# Patient Record
Sex: FEMALE | Race: WHITE | NOT HISPANIC OR LATINO | Employment: OTHER | URBAN - METROPOLITAN AREA
[De-identification: names, ages, dates, MRNs, and addresses within clinical notes are randomized per-mention and may not be internally consistent; named-entity substitution may affect disease eponyms.]

---

## 2018-04-04 ENCOUNTER — LAB REQUISITION (OUTPATIENT)
Dept: LAB | Facility: HOSPITAL | Age: 68
End: 2018-04-04
Payer: COMMERCIAL

## 2018-04-04 DIAGNOSIS — S83.242A OTHER TEAR OF MEDIAL MENISCUS, CURRENT INJURY, LEFT KNEE, INITIAL ENCOUNTER: ICD-10-CM

## 2018-04-04 PROCEDURE — 88304 TISSUE EXAM BY PATHOLOGIST: CPT | Performed by: PATHOLOGY

## 2018-04-10 LAB — SURGICAL PATHOLOGY (HISTORICAL): NORMAL

## 2018-09-21 ENCOUNTER — TRANSCRIBE ORDERS (OUTPATIENT)
Dept: ADMINISTRATIVE | Facility: HOSPITAL | Age: 68
End: 2018-09-21

## 2018-09-21 ENCOUNTER — APPOINTMENT (OUTPATIENT)
Dept: LAB | Facility: HOSPITAL | Age: 68
End: 2018-09-21
Attending: INTERNAL MEDICINE
Payer: COMMERCIAL

## 2018-09-21 DIAGNOSIS — C09.9 RIGHT TONSILLAR SQUAMOUS CELL CARCINOMA (HCC): Primary | ICD-10-CM

## 2018-09-21 DIAGNOSIS — C09.9 RIGHT TONSILLAR SQUAMOUS CELL CARCINOMA (HCC): ICD-10-CM

## 2018-09-21 LAB
ALBUMIN SERPL BCP-MCNC: 4.4 G/DL (ref 3.5–5.7)
ALP SERPL-CCNC: 87 U/L (ref 55–165)
ALT SERPL W P-5'-P-CCNC: 13 U/L (ref 7–52)
ANION GAP SERPL CALCULATED.3IONS-SCNC: 11 MMOL/L (ref 4–13)
AST SERPL W P-5'-P-CCNC: 21 U/L (ref 13–39)
BASOPHILS # BLD AUTO: 0 THOUSANDS/ΜL (ref 0–0.1)
BASOPHILS NFR BLD AUTO: 0 % (ref 0–1)
BILIRUB SERPL-MCNC: 0.4 MG/DL (ref 0.2–1)
BUN SERPL-MCNC: 15 MG/DL (ref 7–25)
CALCIUM SERPL-MCNC: 9.5 MG/DL (ref 8.6–10.5)
CHLORIDE SERPL-SCNC: 101 MMOL/L (ref 98–107)
CO2 SERPL-SCNC: 25 MMOL/L (ref 21–31)
CREAT SERPL-MCNC: 0.85 MG/DL (ref 0.7–1.3)
EOSINOPHIL # BLD AUTO: 0.1 THOUSAND/ΜL (ref 0–0.61)
EOSINOPHIL NFR BLD AUTO: 2 % (ref 0–6)
ERYTHROCYTE [DISTWIDTH] IN BLOOD BY AUTOMATED COUNT: 12.9 % (ref 11.6–15.1)
GFR SERPL CREATININE-BSD FRML MDRD: 90 ML/MIN/1.73SQ M
GLUCOSE SERPL-MCNC: 105 MG/DL (ref 65–140)
HCT VFR BLD AUTO: 40.5 % (ref 42–52)
HGB BLD-MCNC: 13.5 G/DL (ref 12–17)
LYMPHOCYTES # BLD AUTO: 0.8 THOUSANDS/ΜL (ref 0.6–4.47)
LYMPHOCYTES NFR BLD AUTO: 19 % (ref 14–44)
MCH RBC QN AUTO: 32 PG (ref 26.8–34.3)
MCHC RBC AUTO-ENTMCNC: 33.4 G/DL (ref 31.4–37.4)
MCV RBC AUTO: 96 FL (ref 82–98)
MONOCYTES # BLD AUTO: 0.2 THOUSAND/ΜL (ref 0.17–1.22)
MONOCYTES NFR BLD AUTO: 5 % (ref 4–12)
NEUTROPHILS # BLD AUTO: 3.2 THOUSANDS/ΜL (ref 1.85–7.62)
NEUTS SEG NFR BLD AUTO: 74 % (ref 43–75)
NRBC BLD AUTO-RTO: 0 /100 WBCS
PLATELET # BLD AUTO: 240 THOUSANDS/UL (ref 149–390)
PMV BLD AUTO: 8 FL (ref 8.9–12.7)
POTASSIUM SERPL-SCNC: 4.1 MMOL/L (ref 3.5–5.5)
PROT SERPL-MCNC: 7.3 G/DL (ref 6.4–8.9)
RBC # BLD AUTO: 4.23 MILLION/UL (ref 3.88–5.62)
SODIUM SERPL-SCNC: 137 MMOL/L (ref 134–143)
TSH SERPL DL<=0.05 MIU/L-ACNC: 6.86 UIU/ML (ref 0.45–5.33)
WBC # BLD AUTO: 4.3 THOUSAND/UL (ref 4.31–10.16)

## 2018-09-21 PROCEDURE — 84443 ASSAY THYROID STIM HORMONE: CPT

## 2018-09-21 PROCEDURE — 80053 COMPREHEN METABOLIC PANEL: CPT

## 2018-09-21 PROCEDURE — 36415 COLL VENOUS BLD VENIPUNCTURE: CPT

## 2018-09-21 PROCEDURE — 85025 COMPLETE CBC W/AUTO DIFF WBC: CPT

## 2018-09-28 ENCOUNTER — TRANSCRIBE ORDERS (OUTPATIENT)
Dept: ADMINISTRATIVE | Facility: HOSPITAL | Age: 68
End: 2018-09-28

## 2018-09-28 DIAGNOSIS — E03.9 ACQUIRED HYPOTHYROIDISM: ICD-10-CM

## 2018-09-28 DIAGNOSIS — Z92.21 PERSONAL HISTORY OF ANTINEOPLASTIC CHEMOTHERAPY: ICD-10-CM

## 2018-09-28 DIAGNOSIS — E03.9 MYXEDEMA HEART DISEASE: ICD-10-CM

## 2018-09-28 DIAGNOSIS — C09.9 SQUAMOUS CELL CARCINOMA OF RIGHT TONSIL (HCC): ICD-10-CM

## 2018-09-28 DIAGNOSIS — C09.9 MALIGNANT NEOPLASM OF TONSIL (HCC): Primary | ICD-10-CM

## 2018-09-28 DIAGNOSIS — I51.9 MYXEDEMA HEART DISEASE: ICD-10-CM

## 2018-09-28 DIAGNOSIS — Z92.3 PERSONAL HISTORY OF RADIATION THERAPY: ICD-10-CM

## 2018-10-05 ENCOUNTER — HOSPITAL ENCOUNTER (OUTPATIENT)
Dept: ULTRASOUND IMAGING | Facility: HOSPITAL | Age: 68
Discharge: HOME | End: 2018-10-05
Attending: INTERNAL MEDICINE
Payer: COMMERCIAL

## 2018-10-05 ENCOUNTER — APPOINTMENT (OUTPATIENT)
Dept: LAB | Facility: HOSPITAL | Age: 68
End: 2018-10-05
Attending: INTERNAL MEDICINE
Payer: COMMERCIAL

## 2018-10-05 DIAGNOSIS — E03.9 ACQUIRED HYPOTHYROIDISM: ICD-10-CM

## 2018-10-05 DIAGNOSIS — C09.9 SQUAMOUS CELL CARCINOMA OF RIGHT TONSIL (HCC): ICD-10-CM

## 2018-10-05 DIAGNOSIS — C09.9 MALIGNANT NEOPLASM OF TONSIL (HCC): ICD-10-CM

## 2018-10-05 DIAGNOSIS — Z92.21 PERSONAL HISTORY OF ANTINEOPLASTIC CHEMOTHERAPY: ICD-10-CM

## 2018-10-05 DIAGNOSIS — I51.9 MYXEDEMA HEART DISEASE: ICD-10-CM

## 2018-10-05 DIAGNOSIS — E03.9 MYXEDEMA HEART DISEASE: ICD-10-CM

## 2018-10-05 DIAGNOSIS — Z92.3 PERSONAL HISTORY OF RADIATION THERAPY: ICD-10-CM

## 2018-10-05 LAB
ALBUMIN SERPL BCP-MCNC: 4.6 G/DL (ref 3.5–5.7)
ALP SERPL-CCNC: 78 U/L (ref 55–165)
ALT SERPL W P-5'-P-CCNC: 16 U/L (ref 7–52)
ANION GAP SERPL CALCULATED.3IONS-SCNC: 8 MMOL/L (ref 4–13)
AST SERPL W P-5'-P-CCNC: 25 U/L (ref 13–39)
BASOPHILS # BLD AUTO: 0 THOUSANDS/ΜL (ref 0–0.1)
BASOPHILS NFR BLD AUTO: 1 % (ref 0–2)
BILIRUB SERPL-MCNC: 0.4 MG/DL (ref 0.2–1)
BUN SERPL-MCNC: 20 MG/DL (ref 7–25)
CALCIUM SERPL-MCNC: 10.1 MG/DL (ref 8.6–10.5)
CHLORIDE SERPL-SCNC: 101 MMOL/L (ref 98–107)
CO2 SERPL-SCNC: 30 MMOL/L (ref 21–31)
CREAT SERPL-MCNC: 0.93 MG/DL (ref 0.7–1.3)
EOSINOPHIL # BLD AUTO: 0.1 THOUSAND/ΜL (ref 0–0.61)
EOSINOPHIL NFR BLD AUTO: 1 % (ref 0–5)
ERYTHROCYTE [DISTWIDTH] IN BLOOD BY AUTOMATED COUNT: 13.1 % (ref 11.5–14.5)
GFR SERPL CREATININE-BSD FRML MDRD: 85 ML/MIN/1.73SQ M
GLUCOSE SERPL-MCNC: 96 MG/DL (ref 65–99)
HCT VFR BLD AUTO: 38.5 % (ref 36.5–49.3)
HGB BLD-MCNC: 13.2 G/DL (ref 14–18)
LYMPHOCYTES # BLD AUTO: 0.9 THOUSANDS/ΜL (ref 0.6–4.47)
LYMPHOCYTES NFR BLD AUTO: 21 % (ref 21–51)
MCH RBC QN AUTO: 32.8 PG (ref 26–34)
MCHC RBC AUTO-ENTMCNC: 34.2 G/DL (ref 31–37)
MCV RBC AUTO: 96 FL (ref 81–99)
MONOCYTES # BLD AUTO: 0.3 THOUSAND/ΜL (ref 0.17–1.22)
MONOCYTES NFR BLD AUTO: 6 % (ref 2–12)
NEUTROPHILS # BLD AUTO: 3.1 THOUSANDS/ΜL (ref 1.4–6.5)
NEUTS SEG NFR BLD AUTO: 71 % (ref 42–75)
NRBC BLD AUTO-RTO: 0 /100 WBCS
PLATELET # BLD AUTO: 254 THOUSANDS/UL (ref 149–390)
PMV BLD AUTO: 9.1 FL (ref 8.6–11.7)
POTASSIUM SERPL-SCNC: 4.5 MMOL/L (ref 3.5–5.5)
PROT SERPL-MCNC: 7.8 G/DL (ref 6.4–8.9)
RBC # BLD AUTO: 4.02 MILLION/UL (ref 4.3–5.9)
SODIUM SERPL-SCNC: 139 MMOL/L (ref 134–143)
TSH SERPL DL<=0.05 MIU/L-ACNC: 4.64 UIU/ML (ref 0.45–5.33)
WBC # BLD AUTO: 4.4 THOUSAND/UL (ref 4.8–10.8)

## 2018-10-05 PROCEDURE — 80053 COMPREHEN METABOLIC PANEL: CPT

## 2018-10-05 PROCEDURE — 76536 US EXAM OF HEAD AND NECK: CPT

## 2018-10-05 PROCEDURE — 85025 COMPLETE CBC W/AUTO DIFF WBC: CPT

## 2018-10-05 PROCEDURE — 84443 ASSAY THYROID STIM HORMONE: CPT

## 2018-10-05 PROCEDURE — 36415 COLL VENOUS BLD VENIPUNCTURE: CPT

## 2018-11-14 ENCOUNTER — TRANSCRIBE ORDERS (OUTPATIENT)
Dept: LAB | Facility: CLINIC | Age: 68
End: 2018-11-14

## 2019-09-10 ENCOUNTER — TRANSCRIBE ORDERS (OUTPATIENT)
Dept: ADMINISTRATIVE | Facility: HOSPITAL | Age: 69
End: 2019-09-10

## 2019-09-10 DIAGNOSIS — E04.1 THYROID NODULE: ICD-10-CM

## 2019-09-10 DIAGNOSIS — E89.0 POSTSURGICAL HYPOTHYROIDISM: ICD-10-CM

## 2019-09-10 DIAGNOSIS — E04.1 NONTOXIC UNINODULAR GOITER: Primary | ICD-10-CM

## 2019-09-10 DIAGNOSIS — Z92.3 HISTORY OF RADIATION THERAPY: ICD-10-CM

## 2019-09-10 DIAGNOSIS — Z92.21 PERSONAL HISTORY OF ANTINEOPLASTIC CHEMOTHERAPY: ICD-10-CM

## 2019-10-08 ENCOUNTER — HOSPITAL ENCOUNTER (OUTPATIENT)
Dept: ULTRASOUND IMAGING | Facility: HOSPITAL | Age: 69
Discharge: HOME | End: 2019-10-08
Payer: COMMERCIAL

## 2019-10-08 ENCOUNTER — APPOINTMENT (OUTPATIENT)
Dept: LAB | Facility: HOSPITAL | Age: 69
End: 2019-10-08
Payer: COMMERCIAL

## 2019-10-08 DIAGNOSIS — Z92.3 HISTORY OF RADIATION THERAPY: ICD-10-CM

## 2019-10-08 DIAGNOSIS — E04.1 THYROID NODULE: ICD-10-CM

## 2019-10-08 DIAGNOSIS — Z92.21 PERSONAL HISTORY OF ANTINEOPLASTIC CHEMOTHERAPY: ICD-10-CM

## 2019-10-08 DIAGNOSIS — E89.0 POSTSURGICAL HYPOTHYROIDISM: ICD-10-CM

## 2019-10-08 DIAGNOSIS — E04.1 NONTOXIC UNINODULAR GOITER: ICD-10-CM

## 2019-10-08 LAB
ALBUMIN SERPL BCP-MCNC: 4.5 G/DL (ref 3.5–5.7)
ALP SERPL-CCNC: 93 U/L (ref 55–165)
ALT SERPL W P-5'-P-CCNC: 11 U/L (ref 7–52)
ANION GAP SERPL CALCULATED.3IONS-SCNC: 6 MMOL/L (ref 4–13)
AST SERPL W P-5'-P-CCNC: 19 U/L (ref 13–39)
BASOPHILS # BLD AUTO: 0 THOUSANDS/ΜL (ref 0–0.1)
BASOPHILS NFR BLD AUTO: 0 % (ref 0–2)
BILIRUB SERPL-MCNC: 0.4 MG/DL (ref 0.2–1)
BUN SERPL-MCNC: 18 MG/DL (ref 7–25)
CALCIUM SERPL-MCNC: 9.9 MG/DL (ref 8.6–10.5)
CHLORIDE SERPL-SCNC: 102 MMOL/L (ref 98–107)
CO2 SERPL-SCNC: 31 MMOL/L (ref 21–31)
CREAT SERPL-MCNC: 0.91 MG/DL (ref 0.7–1.3)
EOSINOPHIL # BLD AUTO: 0.1 THOUSAND/ΜL (ref 0–0.61)
EOSINOPHIL NFR BLD AUTO: 2 % (ref 0–5)
ERYTHROCYTE [DISTWIDTH] IN BLOOD BY AUTOMATED COUNT: 12.5 % (ref 11.5–14.5)
GFR SERPL CREATININE-BSD FRML MDRD: 86 ML/MIN/1.73SQ M
GLUCOSE P FAST SERPL-MCNC: 109 MG/DL (ref 65–99)
HCT VFR BLD AUTO: 42.1 % (ref 42–47)
HGB BLD-MCNC: 14.2 G/DL (ref 14–18)
LYMPHOCYTES # BLD AUTO: 1 THOUSANDS/ΜL (ref 0.6–4.47)
LYMPHOCYTES NFR BLD AUTO: 17 % (ref 21–51)
MCH RBC QN AUTO: 32.7 PG (ref 26–34)
MCHC RBC AUTO-ENTMCNC: 33.7 G/DL (ref 31–37)
MCV RBC AUTO: 97 FL (ref 81–99)
MONOCYTES # BLD AUTO: 0.3 THOUSAND/ΜL (ref 0.17–1.22)
MONOCYTES NFR BLD AUTO: 5 % (ref 2–12)
NEUTROPHILS # BLD AUTO: 4.2 THOUSANDS/ΜL (ref 1.4–6.5)
NEUTS SEG NFR BLD AUTO: 75 % (ref 42–75)
PLATELET # BLD AUTO: 231 THOUSANDS/UL (ref 149–390)
PMV BLD AUTO: 8.5 FL (ref 8.6–11.7)
POTASSIUM SERPL-SCNC: 4.2 MMOL/L (ref 3.5–5.5)
PROT SERPL-MCNC: 7.7 G/DL (ref 6.4–8.9)
RBC # BLD AUTO: 4.34 MILLION/UL (ref 4.3–5.9)
SODIUM SERPL-SCNC: 139 MMOL/L (ref 134–143)
T3FREE SERPL-MCNC: 2.86 PG/ML (ref 2.3–4.2)
T4 FREE SERPL-MCNC: 0.68 NG/DL (ref 0.76–1.46)
TSH SERPL DL<=0.05 MIU/L-ACNC: 12.17 UIU/ML (ref 0.45–5.33)
WBC # BLD AUTO: 5.6 THOUSAND/UL (ref 4.8–10.8)

## 2019-10-08 PROCEDURE — 85025 COMPLETE CBC W/AUTO DIFF WBC: CPT

## 2019-10-08 PROCEDURE — 84481 FREE ASSAY (FT-3): CPT

## 2019-10-08 PROCEDURE — 76536 US EXAM OF HEAD AND NECK: CPT

## 2019-10-08 PROCEDURE — 80053 COMPREHEN METABOLIC PANEL: CPT

## 2019-10-08 PROCEDURE — 84443 ASSAY THYROID STIM HORMONE: CPT

## 2019-10-08 PROCEDURE — 84439 ASSAY OF FREE THYROXINE: CPT

## 2019-10-08 PROCEDURE — 36415 COLL VENOUS BLD VENIPUNCTURE: CPT

## 2019-12-14 ENCOUNTER — OFFICE VISIT (OUTPATIENT)
Dept: URGENT CARE | Facility: HOSPITAL | Age: 69
End: 2019-12-14
Payer: COMMERCIAL

## 2019-12-14 VITALS
HEIGHT: 66 IN | BODY MASS INDEX: 27.16 KG/M2 | TEMPERATURE: 98.5 F | WEIGHT: 169 LBS | OXYGEN SATURATION: 96 % | DIASTOLIC BLOOD PRESSURE: 92 MMHG | RESPIRATION RATE: 18 BRPM | HEART RATE: 85 BPM | SYSTOLIC BLOOD PRESSURE: 139 MMHG

## 2019-12-14 DIAGNOSIS — J01.90 ACUTE SINUSITIS, RECURRENCE NOT SPECIFIED, UNSPECIFIED LOCATION: Primary | ICD-10-CM

## 2019-12-14 DIAGNOSIS — B00.1 COLD SORE: ICD-10-CM

## 2019-12-14 PROCEDURE — 99213 OFFICE O/P EST LOW 20 MIN: CPT | Performed by: NURSE PRACTITIONER

## 2019-12-14 PROCEDURE — S9083 URGENT CARE CENTER GLOBAL: HCPCS | Performed by: NURSE PRACTITIONER

## 2019-12-14 RX ORDER — FLUOXETINE HYDROCHLORIDE 20 MG/1
20 CAPSULE ORAL DAILY
COMMUNITY
End: 2020-02-18 | Stop reason: SDUPTHER

## 2019-12-14 RX ORDER — LORAZEPAM 2 MG/1
2 TABLET ORAL 3 TIMES DAILY
COMMUNITY
End: 2020-02-18 | Stop reason: SDUPTHER

## 2019-12-14 RX ORDER — VALACYCLOVIR HYDROCHLORIDE 1 G/1
2000 TABLET, FILM COATED ORAL 2 TIMES DAILY
Qty: 4 TABLET | Refills: 0 | Status: SHIPPED | OUTPATIENT
Start: 2019-12-14 | End: 2020-02-18

## 2019-12-14 RX ORDER — DOXYCYCLINE 100 MG/1
100 TABLET ORAL 2 TIMES DAILY
Qty: 14 TABLET | Refills: 0 | Status: SHIPPED | OUTPATIENT
Start: 2019-12-14 | End: 2019-12-21

## 2019-12-14 RX ORDER — LEVOTHYROXINE SODIUM 0.1 MG/1
100 TABLET ORAL DAILY
COMMUNITY
End: 2020-08-18 | Stop reason: SDUPTHER

## 2019-12-14 NOTE — PROGRESS NOTES
[facility]        NAME: Josi Churchill is a 77 y o  male  : 1950    MRN: [id number]  DATE: 2019  TIME: 10:50 AM    Assessment and Plan   Acute sinusitis, recurrence not specified, unspecified location [J01 90]  1  Acute sinusitis, recurrence not specified, unspecified location  doxycycline (ADOXA) 100 MG tablet   2  Cold sore  valACYclovir (VALTREX) 1,000 mg tablet         Patient Instructions     Patient Instructions   Rest and drink extra fluids  Start antibiotic  Take probiotic  Valtrex as prescribed for cold sores  Continue with the counter medications as needed  Tylenol Motrin as needed for pain or fever  Follow up with family doctor if no improvement  Go to ER with any worsening symptoms, chest pain, shortness breath or difficulty breathing  The      Chief Complaint     Chief Complaint   Patient presents with    Fever     chills off and on    Headache    Nasal Congestion     x one month         History of Present Illness   Josi Churchill presents to the clinic c/o    This is a 52-year-old female here today with complaints of sinus pressure, congestion, and cold sores  She states symptoms started several weeks ago after having influenza vaccine  She states she then developed a cold-like illness  She states congested with her chest but this has resolved  She states that this time she is having sinus pressure congestion and has had some sores around her mouth  She denies any chest pain or shortness of breath at this time  She has been using over-the-counter Mucinex as needed  She states she has had cold sores in the past and has needed Valtrex  Review of Systems   Review of Systems   Constitutional: Negative for activity change, chills and fatigue  HENT: Positive for congestion, sinus pressure and sinus pain  Respiratory: Positive for cough  Negative for chest tightness, shortness of breath and wheezing  Cardiovascular: Negative  Skin: Negative  Neurological: Negative  Psychiatric/Behavioral: Negative  Current Medications     Long-Term Medications   Medication Sig Dispense Refill    FLUoxetine (PROzac) 20 mg capsule Take 20 mg by mouth daily      levothyroxine 100 mcg tablet Take 100 mcg by mouth daily      LORazepam (ATIVAN) 2 mg tablet Take 2 mg by mouth 3 (three) times a day      valACYclovir (VALTREX) 1,000 mg tablet Take 2 tablets (2,000 mg total) by mouth 2 (two) times a day for 1 day 4 tablet 0       Current Allergies     Allergies as of 12/14/2019    (No Known Allergies)            The following portions of the patient's history were reviewed and updated as appropriate: allergies, current medications, past family history, past medical history, past social history, past surgical history and problem list     Objective   /92   Pulse 85   Temp 98 5 °F (36 9 °C) (Tympanic)   Resp 18   Ht 5' 6" (1 676 m)   Wt 76 7 kg (169 lb)   SpO2 96%   BMI 27 28 kg/m²        Physical Exam     Physical Exam   Constitutional: He is oriented to person, place, and time  He appears well-developed and well-nourished  HENT:   Right Ear: External ear normal    Left TM has no TM, this is normal for her as per patient  No sign of infection  Neck: Normal range of motion  Neck supple  Cardiovascular: Normal rate and regular rhythm  Pulmonary/Chest: Effort normal and breath sounds normal    Neurological: He is alert and oriented to person, place, and time  Skin: Skin is warm and dry  Rash noted  Several scattered vesicular lesions around on outer lip  Psychiatric: He has a normal mood and affect  His behavior is normal    Nursing note and vitals reviewed

## 2019-12-14 NOTE — PATIENT INSTRUCTIONS
Rest and drink extra fluids  Start antibiotic  Take probiotic  Valtrex as prescribed for cold sores  Continue with the counter medications as needed  Tylenol Motrin as needed for pain or fever  Follow up with family doctor if no improvement  Go to ER with any worsening symptoms, chest pain, shortness breath or difficulty breathing    The

## 2020-02-18 ENCOUNTER — OFFICE VISIT (OUTPATIENT)
Dept: FAMILY MEDICINE CLINIC | Facility: CLINIC | Age: 70
End: 2020-02-18
Payer: COMMERCIAL

## 2020-02-18 VITALS
DIASTOLIC BLOOD PRESSURE: 88 MMHG | WEIGHT: 173 LBS | HEART RATE: 87 BPM | TEMPERATURE: 98.2 F | OXYGEN SATURATION: 98 % | SYSTOLIC BLOOD PRESSURE: 138 MMHG | RESPIRATION RATE: 16 BRPM | HEIGHT: 66 IN | BODY MASS INDEX: 27.8 KG/M2

## 2020-02-18 DIAGNOSIS — Z00.00 ENCOUNTER FOR MEDICARE ANNUAL WELLNESS EXAM: ICD-10-CM

## 2020-02-18 DIAGNOSIS — Z13.220 SCREENING CHOLESTEROL LEVEL: ICD-10-CM

## 2020-02-18 DIAGNOSIS — Z11.59 ENCOUNTER FOR HEPATITIS C SCREENING TEST FOR LOW RISK PATIENT: ICD-10-CM

## 2020-02-18 DIAGNOSIS — F41.8 DEPRESSION WITH ANXIETY: Primary | ICD-10-CM

## 2020-02-18 DIAGNOSIS — R73.01 IMPAIRED FASTING GLUCOSE: ICD-10-CM

## 2020-02-18 DIAGNOSIS — C09.9 MALIGNANT NEOPLASM OF TONSIL (HCC): ICD-10-CM

## 2020-02-18 DIAGNOSIS — E66.3 OVERWEIGHT (BMI 25.0-29.9): ICD-10-CM

## 2020-02-18 DIAGNOSIS — E03.9 ACQUIRED HYPOTHYROIDISM: ICD-10-CM

## 2020-02-18 DIAGNOSIS — Z12.31 SCREENING MAMMOGRAM, ENCOUNTER FOR: ICD-10-CM

## 2020-02-18 DIAGNOSIS — Z12.11 SCREENING FOR COLON CANCER: ICD-10-CM

## 2020-02-18 PROCEDURE — 1160F RVW MEDS BY RX/DR IN RCRD: CPT | Performed by: NURSE PRACTITIONER

## 2020-02-18 PROCEDURE — 1170F FXNL STATUS ASSESSED: CPT | Performed by: NURSE PRACTITIONER

## 2020-02-18 PROCEDURE — 3288F FALL RISK ASSESSMENT DOCD: CPT | Performed by: NURSE PRACTITIONER

## 2020-02-18 PROCEDURE — 1036F TOBACCO NON-USER: CPT | Performed by: NURSE PRACTITIONER

## 2020-02-18 PROCEDURE — 99214 OFFICE O/P EST MOD 30 MIN: CPT | Performed by: NURSE PRACTITIONER

## 2020-02-18 PROCEDURE — 1101F PT FALLS ASSESS-DOCD LE1/YR: CPT | Performed by: NURSE PRACTITIONER

## 2020-02-18 PROCEDURE — 3008F BODY MASS INDEX DOCD: CPT | Performed by: NURSE PRACTITIONER

## 2020-02-18 PROCEDURE — G0438 PPPS, INITIAL VISIT: HCPCS | Performed by: NURSE PRACTITIONER

## 2020-02-18 PROCEDURE — 1125F AMNT PAIN NOTED PAIN PRSNT: CPT | Performed by: NURSE PRACTITIONER

## 2020-02-18 RX ORDER — FLUOXETINE HYDROCHLORIDE 20 MG/1
20 CAPSULE ORAL DAILY
Qty: 90 CAPSULE | Refills: 3 | Status: SHIPPED | OUTPATIENT
Start: 2020-02-18 | End: 2020-05-06 | Stop reason: SDUPTHER

## 2020-02-18 RX ORDER — LORAZEPAM 2 MG/1
2 TABLET ORAL 3 TIMES DAILY
Qty: 90 TABLET | Refills: 2 | Status: SHIPPED | OUTPATIENT
Start: 2020-02-18 | End: 2020-05-06 | Stop reason: SDUPTHER

## 2020-02-18 RX ORDER — MULTIVITAMIN
1 TABLET ORAL DAILY
COMMUNITY

## 2020-02-18 NOTE — PATIENT INSTRUCTIONS
Monitor blood pressure daily- if consistently over 140/90- call office  Get labs drawn  Refills given for Fluoxetine and Lorazepam, call when Levothyroxine refills due  Will get Colonoscopy report from Dr Indra Yepez- if over 10 years- will have cologuard sent to your home- patient agreeable  Mammogram will be scheduled at check out  Return in 6 months or sooner if any problems/concerns        Medicare Preventive Visit Patient Instructions  Thank you for completing your Welcome to Medicare Visit or Medicare Annual Wellness Visit today  Your next wellness visit will be due in one year (2/18/2021)  The screening/preventive services that you may require over the next 5-10 years are detailed below  Some tests may not apply to you based off risk factors and/or age  Screening tests ordered at today's visit but not completed yet may show as past due  Also, please note that scanned in results may not display below  Preventive Screenings:  Service Recommendations Previous Testing/Comments   Colorectal Cancer Screening  · Colonoscopy    · Fecal Occult Blood Test (FOBT)/Fecal Immunochemical Test (FIT)  · Fecal DNA/Cologuard Test  · Flexible Sigmoidoscopy Age: 54-65 years old   Colonoscopy: every 10 years (May be performed more frequently if at higher risk)  OR  FOBT/FIT: every 1 year  OR  Cologuard: every 3 years  OR  Sigmoidoscopy: every 5 years  Screening may be recommended earlier than age 48 if at higher risk for colorectal cancer  Also, an individualized decision between you and your healthcare provider will decide whether screening between the ages of 74-80 would be appropriate   Colonoscopy: Not on file  FOBT/FIT: Not on file  Cologuard: Not on file  Sigmoidoscopy: Not on file         Prostate Cancer Screening Individualized decision between patient and health care provider in men between ages of 53-78   Medicare will cover every 12 months beginning on the day after your 50th birthday PSA: No results in last 5 years Hepatitis C Screening Once for adults born between 1945 and 1965  More frequently in patients at high risk for Hepatitis C Hep C Antibody: Not on file       Diabetes Screening 1-2 times per year if you're at risk for diabetes or have pre-diabetes Fasting glucose: 109 mg/dL   A1C: No results in last 5 years    Screening Current   Cholesterol Screening Once every 5 years if you don't have a lipid disorder  May order more often based on risk factors  Lipid panel: Not on file          Other Preventive Screenings Covered by Medicare:  1  Abdominal Aortic Aneurysm (AAA) Screening: covered once if your at risk  You're considered to be at risk if you have a family history of AAA or a male between the age of 73-68 who smoking at least 100 cigarettes in your lifetime  2  Lung Cancer Screening: covers low dose CT scan once per year if you meet all of the following conditions: (1) Age 50-69; (2) No signs or symptoms of lung cancer; (3) Current smoker or have quit smoking within the last 15 years; (4) You have a tobacco smoking history of at least 30 pack years (packs per day x number of years you smoked); (5) You get a written order from a healthcare provider  3  Glaucoma Screening: covered annually if you're considered high risk: (1) You have diabetes OR (2) Family history of glaucoma OR (3)  aged 48 and older OR (3)  American aged 72 and older  3  Osteoporosis Screening: covered every 2 years if you meet one of the following conditions: (1) Have a vertebral abnormality; (2) On glucocorticoid therapy for more than 3 months; (3) Have primary hyperparathyroidism; (4) On osteoporosis medications and need to assess response to drug therapy  5  HIV Screening: covered annually if you're between the age of 12-76  Also covered annually if you are younger than 13 and older than 72 with risk factors for HIV infection   For pregnant patients, it is covered up to 3 times per pregnancy  Immunizations:  Immunization Recommendations   Influenza Vaccine Annual influenza vaccination during flu season is recommended for all persons aged >= 6 months who do not have contraindications   Pneumococcal Vaccine (Prevnar and Pneumovax)  * Prevnar = PCV13  * Pneumovax = PPSV23 Adults 25-60 years old: 1-3 doses may be recommended based on certain risk factors  Adults 72 years old: Prevnar (PCV13) vaccine recommended followed by Pneumovax (PPSV23) vaccine  If already received PPSV23 since turning 65, then PCV13 recommended at least one year after PPSV23 dose  Hepatitis B Vaccine 3 dose series if at intermediate or high risk (ex: diabetes, end stage renal disease, liver disease)   Tetanus (Td) Vaccine - COST NOT COVERED BY MEDICARE PART B Following completion of primary series, a booster dose should be given every 10 years to maintain immunity against tetanus  Td may also be given as tetanus wound prophylaxis  Tdap Vaccine - COST NOT COVERED BY MEDICARE PART B Recommended at least once for all adults  For pregnant patients, recommended with each pregnancy  Shingles Vaccine (Shingrix) - COST NOT COVERED BY MEDICARE PART B  2 shot series recommended in those aged 48 and above     Health Maintenance Due:      Topic Date Due    Hepatitis C Screening  1950    CRC Screening: Colonoscopy  1950     Immunizations Due:      Topic Date Due    DTaP,Tdap,and Td Vaccines (1 - Tdap) 08/31/1962    Pneumococcal Vaccine: 65+ Years (1 of 2 - PCV13) 08/31/2016    Influenza Vaccine  07/01/2019     Advance Directives   What are advance directives? Advance directives are legal documents that state your wishes and plans for medical care  These plans are made ahead of time in case you lose your ability to make decisions for yourself  Advance directives can apply to any medical decision, such as the treatments you want, and if you want to donate organs  What are the types of advance directives? There are many types of advance directives, and each state has rules about how to use them  You may choose a combination of any of the following:  · Living will: This is a written record of the treatment you want  You can also choose which treatments you do not want, which to limit, and which to stop at a certain time  This includes surgery, medicine, IV fluid, and tube feedings  · Durable power of  for healthcare [facility] [place]): This is a written record that states who you want to make healthcare choices for you when you are unable to make them for yourself  This person, called a proxy, is usually a family member or a friend  You may choose more than 1 proxy  · Do not resuscitate (DNR) order:  A DNR order is used in case your heart stops beating or you stop breathing  It is a request not to have certain forms of treatment, such as CPR  A DNR order may be included in other types of advance directives  · Medical directive: This covers the care that you want if you are in a coma, near death, or unable to make decisions for yourself  You can list the treatments you want for each condition  Treatment may include pain medicine, surgery, blood transfusions, dialysis, IV or tube feedings, and a ventilator (breathing machine)  · Values history: This document has questions about your views, beliefs, and how you feel and think about life  This information can help others choose the care that you would choose  Why are advance directives important? An advance directive helps you control your care  Although spoken wishes may be used, it is better to have your wishes written down  Spoken wishes can be misunderstood, or not followed  Treatments may be given even if you do not want them  An advance directive may make it easier for your family to make difficult choices about your care     Weight Management   Why it is important to manage your weight:  Being overweight increases your risk of health conditions such as heart disease, high blood pressure, type 2 diabetes, and certain types of cancer  It can also increase your risk for osteoarthritis, sleep apnea, and other respiratory problems  Aim for a slow, steady weight loss  Even a small amount of weight loss can lower your risk of health problems  How to lose weight safely:  A safe and healthy way to lose weight is to eat fewer calories and get regular exercise  You can lose up about 1 pound a week by decreasing the number of calories you eat by 500 calories each day  Healthy meal plan for weight management:  A healthy meal plan includes a variety of foods, contains fewer calories, and helps you stay healthy  A healthy meal plan includes the following:  · Eat whole-grain foods more often  A healthy meal plan should contain fiber  Fiber is the part of grains, fruits, and vegetables that is not broken down by your body  Whole-grain foods are healthy and provide extra fiber in your diet  Some examples of whole-grain foods are whole-wheat breads and pastas, oatmeal, brown rice, and bulgur  · Eat a variety of vegetables every day  Include dark, leafy greens such as spinach, kale, amalia greens, and mustard greens  Eat yellow and orange vegetables such as carrots, sweet potatoes, and winter squash  · Eat a variety of fruits every day  Choose fresh or canned fruit (canned in its own juice or light syrup) instead of juice  Fruit juice has very little or no fiber  · Eat low-fat dairy foods  Drink fat-free (skim) milk or 1% milk  Eat fat-free yogurt and low-fat cottage cheese  Try low-fat cheeses such as mozzarella and other reduced-fat cheeses  · Choose meat and other protein foods that are low in fat  Choose beans or other legumes such as split peas or lentils  Choose fish, skinless poultry (chicken or turkey), or lean cuts of red meat (beef or pork)  Before you cook meat or poultry, cut off any visible fat  · Use less fat and oil  Try baking foods instead of frying them  Add less fat, such as margarine, sour cream, regular salad dressing and mayonnaise to foods  Eat fewer high-fat foods  Some examples of high-fat foods include french fries, doughnuts, ice cream, and cakes  · Eat fewer sweets  Limit foods and drinks that are high in sugar  This includes candy, cookies, regular soda, and sweetened drinks  Exercise:  Exercise at least 30 minutes per day on most days of the week  Some examples of exercise include walking, biking, dancing, and swimming  You can also fit in more physical activity by taking the stairs instead of the elevator or parking farther away from stores  Ask your healthcare provider about the best exercise plan for you  © Copyright [employer] 2018 Information is for End User's use only and may not be sold, redistributed or otherwise used for commercial purposes   All illustrations and images included in CareNotes® are the copyrighted property of [employer]  or [address] [place] [address]

## 2020-02-18 NOTE — PROGRESS NOTES
[facility]        NAME: Lindley Cogan is a 77 y o  male  : 1950    MRN: [id number]  DATE: 2020  TIME: 1:08 PM    Assessment and Plan   Depression with anxiety [F41 8]  1  Depression with anxiety  Comprehensive metabolic panel    FLUoxetine (PROzac) 20 mg capsule    LORazepam (ATIVAN) 2 mg tablet   2  Encounter for hepatitis C screening test for low risk patient  Hepatitis C antibody   3  Screening for colon cancer      colonoscopy done by Dr Andrzej Malcolm   4  Screening mammogram, encounter for  Mammo screening bilateral w 3d & cad   5  Screening cholesterol level  Lipid panel   6  Impaired fasting glucose  HEMOGLOBIN A1C W/ EAG ESTIMATION   7  Malignant neoplasm of tonsil ([facility] [address] )     8  Encounter for Medicare annual wellness exam     9  Overweight (BMI 25 0-29 9)     10  Acquired hypothyroidism           Patient Instructions     Patient Instructions     Monitor blood pressure daily- if consistently over 140/90- call office  Get labs drawn  Refills given for Fluoxetine and Lorazepam, call when Levothyroxine refills due  Will get Colonoscopy report from Dr Andrzej Malcolm- if over 10 years- will have cologuard sent to your home- patient agreeable  Mammogram will be scheduled at check out  Return in 6 months or sooner if any problems/concerns        Medicare Preventive Visit Patient Instructions  Thank you for completing your Welcome to Medicare Visit or Medicare Annual Wellness Visit today  Your next wellness visit will be due in one year (2021)  The screening/preventive services that you may require over the next 5-10 years are detailed below  Some tests may not apply to you based off risk factors and/or age  Screening tests ordered at today's visit but not completed yet may show as past due  Also, please note that scanned in results may not display below    Preventive Screenings:  Service Recommendations Previous Testing/Comments   Colorectal Cancer Screening  · Colonoscopy    · Fecal Occult Blood Test (FOBT)/Fecal Immunochemical Test (FIT)  · Fecal DNA/Cologuard Test  · Flexible Sigmoidoscopy Age: 54-65 years old   Colonoscopy: every 10 years (May be performed more frequently if at higher risk)  OR  FOBT/FIT: every 1 year  OR  Cologuard: every 3 years  OR  Sigmoidoscopy: every 5 years  Screening may be recommended earlier than age 48 if at higher risk for colorectal cancer  Also, an individualized decision between you and your healthcare provider will decide whether screening between the ages of 74-80 would be appropriate  Colonoscopy: Not on file  FOBT/FIT: Not on file  Cologuard: Not on file  Sigmoidoscopy: Not on file         Prostate Cancer Screening Individualized decision between patient and health care provider in men between ages of 53-78   Medicare will cover every 12 months beginning on the day after your 50th birthday PSA: No results in last 5 years          Hepatitis C Screening Once for adults born between [facility]  More frequently in patients at high risk for Hepatitis C Hep C Antibody: Not on file       Diabetes Screening 1-2 times per year if you're at risk for diabetes or have pre-diabetes Fasting glucose: 109 mg/dL   A1C: No results in last 5 years    Screening Current   Cholesterol Screening Once every 5 years if you don't have a lipid disorder  May order more often based on risk factors  Lipid panel: Not on file          Other Preventive Screenings Covered by Medicare:  1  Abdominal Aortic Aneurysm (AAA) Screening: covered once if your at risk  You're considered to be at risk if you have a family history of AAA or a male between the age of 73-68 who smoking at least 100 cigarettes in your lifetime    2  Lung Cancer Screening: covers low dose CT scan once per year if you meet all of the following conditions: (1) Age 50-69; (2) No signs or symptoms of lung cancer; (3) Current smoker or have quit smoking within the last 15 years; (4) You have a tobacco smoking history of at least 30 pack years (packs per day x number of years you smoked); (5) You get a written order from a healthcare provider  3  Glaucoma Screening: covered annually if you're considered high risk: (1) You have diabetes OR (2) Family history of glaucoma OR (3)  aged 48 and older OR (3)  American aged 72 and older  3  Osteoporosis Screening: covered every 2 years if you meet one of the following conditions: (1) Have a vertebral abnormality; (2) On glucocorticoid therapy for more than 3 months; (3) Have primary hyperparathyroidism; (4) On osteoporosis medications and need to assess response to drug therapy  5  HIV Screening: covered annually if you're between the age of 12-76  Also covered annually if you are younger than 13 and older than 72 with risk factors for HIV infection  For pregnant patients, it is covered up to 3 times per pregnancy  Immunizations:  Immunization Recommendations   Influenza Vaccine Annual influenza vaccination during flu season is recommended for all persons aged >= 6 months who do not have contraindications   Pneumococcal Vaccine (Prevnar and Pneumovax)  * Prevnar = PCV13  * Pneumovax = PPSV23 Adults 25-60 years old: 1-3 doses may be recommended based on certain risk factors  Adults 72 years old: Prevnar (PCV13) vaccine recommended followed by Pneumovax (PPSV23) vaccine  If already received PPSV23 since turning 65, then PCV13 recommended at least one year after PPSV23 dose  Hepatitis B Vaccine 3 dose series if at intermediate or high risk (ex: diabetes, end stage renal disease, liver disease)   Tetanus (Td) Vaccine - COST NOT COVERED BY MEDICARE PART B Following completion of primary series, a booster dose should be given every 10 years to maintain immunity against tetanus  Td may also be given as tetanus wound prophylaxis  Tdap Vaccine - COST NOT COVERED BY MEDICARE PART B Recommended at least once for all adults   For pregnant patients, recommended with each pregnancy  Shingles Vaccine (Shingrix) - COST NOT COVERED BY MEDICARE PART B  2 shot series recommended in those aged 48 and above     Health Maintenance Due:      Topic Date Due    Hepatitis C Screening  1950    CRC Screening: Colonoscopy  1950     Immunizations Due:      Topic Date Due    DTaP,Tdap,and Td Vaccines (1 - Tdap) 08/31/1962    Pneumococcal Vaccine: 65+ Years (1 of 2 - PCV13) 08/31/2016    Influenza Vaccine  07/01/2019     Advance Directives   What are advance directives? Advance directives are legal documents that state your wishes and plans for medical care  These plans are made ahead of time in case you lose your ability to make decisions for yourself  Advance directives can apply to any medical decision, such as the treatments you want, and if you want to donate organs  What are the types of advance directives? There are many types of advance directives, and each state has rules about how to use them  You may choose a combination of any of the following:  · Living will: This is a written record of the treatment you want  You can also choose which treatments you do not want, which to limit, and which to stop at a certain time  This includes surgery, medicine, IV fluid, and tube feedings  · Durable power of  for healthcare [facility]): This is a written record that states who you want to make healthcare choices for you when you are unable to make them for yourself  This person, called a proxy, is usually a family member or a friend  You may choose more than 1 proxy  · Do not resuscitate (DNR) order:  A DNR order is used in case your heart stops beating or you stop breathing  It is a request not to have certain forms of treatment, such as CPR  A DNR order may be included in other types of advance directives  · Medical directive: This covers the care that you want if you are in a coma, near death, or unable to make decisions for yourself   You can list the treatments you want for each condition  Treatment may include pain medicine, surgery, blood transfusions, dialysis, IV or tube feedings, and a ventilator (breathing machine)  · Values history: This document has questions about your views, beliefs, and how you feel and think about life  This information can help others choose the care that you would choose  Why are advance directives important? An advance directive helps you control your care  Although spoken wishes may be used, it is better to have your wishes written down  Spoken wishes can be misunderstood, or not followed  Treatments may be given even if you do not want them  An advance directive may make it easier for your family to make difficult choices about your care  Weight Management   Why it is important to manage your weight:  Being overweight increases your risk of health conditions such as heart disease, high blood pressure, type 2 diabetes, and certain types of cancer  It can also increase your risk for osteoarthritis, sleep apnea, and other respiratory problems  Aim for a slow, steady weight loss  Even a small amount of weight loss can lower your risk of health problems  How to lose weight safely:  A safe and healthy way to lose weight is to eat fewer calories and get regular exercise  You can lose up about 1 pound a week by decreasing the number of calories you eat by 500 calories each day  Healthy meal plan for weight management:  A healthy meal plan includes a variety of foods, contains fewer calories, and helps you stay healthy  A healthy meal plan includes the following:  · Eat whole-grain foods more often  A healthy meal plan should contain fiber  Fiber is the part of grains, fruits, and vegetables that is not broken down by your body  Whole-grain foods are healthy and provide extra fiber in your diet  Some examples of whole-grain foods are whole-wheat breads and pastas, oatmeal, brown rice, and bulgur  · Eat a variety of vegetables every day  Include dark, leafy greens such as spinach, kale, amalia greens, and mustard greens  Eat yellow and orange vegetables such as carrots, sweet potatoes, and winter squash  · Eat a variety of fruits every day  Choose fresh or canned fruit (canned in its own juice or light syrup) instead of juice  Fruit juice has very little or no fiber  · Eat low-fat dairy foods  Drink fat-free (skim) milk or 1% milk  Eat fat-free yogurt and low-fat cottage cheese  Try low-fat cheeses such as mozzarella and other reduced-fat cheeses  · Choose meat and other protein foods that are low in fat  Choose beans or other legumes such as split peas or lentils  Choose fish, skinless poultry (chicken or turkey), or lean cuts of red meat (beef or pork)  Before you cook meat or poultry, cut off any visible fat  · Use less fat and oil  Try baking foods instead of frying them  Add less fat, such as margarine, sour cream, regular salad dressing and mayonnaise to foods  Eat fewer high-fat foods  Some examples of high-fat foods include french fries, doughnuts, ice cream, and cakes  · Eat fewer sweets  Limit foods and drinks that are high in sugar  This includes candy, cookies, regular soda, and sweetened drinks  Exercise:  Exercise at least 30 minutes per day on most days of the week  Some examples of exercise include walking, biking, dancing, and swimming  You can also fit in more physical activity by taking the stairs instead of the elevator or parking farther away from stores  Ask your healthcare provider about the best exercise plan for you  © Copyright [employer] 2018 Information is for End User's use only and may not be sold, redistributed or otherwise used for commercial purposes   All illustrations and images included in CareNotes® are the copyrighted property of A D A M , Inc  or [facility] Alethea Aranda           Chief Complaint     Chief Complaint   Patient presents with   [address]     [facility] Wellness Visit         History of Present Illness       Here to establish care- previously seen by Dr Matty Padilla  Recently started on Levothyroxine 100mcg for hypothyroidism  Anxiety with Depression- Fluoxetine 20mg daily and Lorazepam 2mg up to 3 times per day- always takes before bedtime and usually 1 other time per day  Has never been on blood pressure medication  Pt  Had colonoscopy unsure (10 years ago?) with Dr Aurelio Rivera- if she is due she will get a Cologuard  Is willing to get mammogram        Review of Systems   Review of Systems   Constitutional: Negative for activity change, diaphoresis, fatigue and fever  HENT: Negative for congestion, facial swelling, hearing loss, rhinorrhea, sinus pressure, sinus pain, sneezing, sore throat and voice change  Eyes: Negative for discharge and visual disturbance  Respiratory: Negative for cough, choking, chest tightness, shortness of breath, wheezing and stridor  Cardiovascular: Negative for chest pain, palpitations and leg swelling  Gastrointestinal: Negative for abdominal distention, abdominal pain, constipation, diarrhea, nausea and vomiting  Endocrine: Negative for polydipsia, polyphagia and polyuria  Genitourinary: Negative for difficulty urinating, dysuria, frequency and urgency  Musculoskeletal: Negative for arthralgias, back pain, gait problem, joint swelling, myalgias, neck pain and neck stiffness  Skin: Negative for color change, rash and wound  Neurological: Negative for dizziness, syncope, speech difficulty, weakness, light-headedness and headaches  Hematological: Negative for adenopathy  Does not bruise/bleed easily  Psychiatric/Behavioral: Negative for agitation, behavioral problems, confusion, hallucinations, sleep disturbance and suicidal ideas  The patient is not nervous/anxious            Current Medications       Current Outpatient Medications:     FLUoxetine (PROzac) 20 mg capsule, Take 1 capsule (20 mg total) by mouth daily, Disp: 90 capsule, Rfl: 3    levothyroxine 100 mcg tablet, Take 100 mcg by mouth daily, Disp: , Rfl:     LORazepam (ATIVAN) 2 mg tablet, Take 1 tablet (2 mg total) by mouth 3 (three) times a day, Disp: 90 tablet, Rfl: 2    Multiple Vitamin (MULTIVITAMIN) tablet, Take 1 tablet by mouth daily, Disp: , Rfl:     Current Allergies     Allergies as of 02/18/2020    (No Known Allergies)            The following portions of the patient's history were reviewed and updated as appropriate: allergies, current medications, past family history, past medical history, past social history, past surgical history and problem list      Past Medical History:   Diagnosis Date    Anxiety     Cancer ([facility] [address] )     Disease of thyroid gland        Past Surgical History:   Procedure Laterality Date    TONSILLECTOMY         History reviewed  No pertinent family history  Medications have been verified  Objective   /88   Pulse 87   Temp 98 2 °F (36 8 °C) (Tympanic)   Resp 16   Ht 5' 6" (1 676 m)   Wt 78 5 kg (173 lb)   SpO2 98%   BMI 27 92 kg/m²        Physical Exam     Physical Exam   Constitutional: He is oriented to person, place, and time  He appears well-developed and well-nourished  No distress  HENT:   Head: Normocephalic and atraumatic  Right Ear: Tympanic membrane, external ear and ear canal normal    Left Ear: Tympanic membrane, external ear and ear canal normal    Nose: Nose normal    Mouth/Throat: Uvula is midline, oropharynx is clear and moist and mucous membranes are normal  No oropharyngeal exudate  Eyes: Pupils are equal, round, and reactive to light  Conjunctivae and EOM are normal  Right eye exhibits no discharge  Left eye exhibits no discharge  Neck: Normal range of motion  Neck supple  No tracheal deviation present  No thyromegaly present  Cardiovascular: Normal rate, regular rhythm and normal heart sounds  No murmur heard    Pulmonary/Chest: Effort normal and breath sounds normal  No respiratory distress  He has no wheezes  Musculoskeletal: Normal range of motion  He exhibits no edema, tenderness or deformity  Lymphadenopathy:     He has no cervical adenopathy  Neurological: He is alert and oriented to person, place, and time  Skin: Skin is warm and dry  He is not diaphoretic  Psychiatric: He has a normal mood and affect  His behavior is normal  Judgment and thought content normal    Nursing note and vitals reviewed  BMI Counseling: Body mass index is 27 92 kg/m²  The BMI is above normal  Nutrition recommendations include reducing portion sizes, decreasing overall calorie intake, 3-5 servings of fruits/vegetables daily, reducing fast food intake, consuming healthier snacks, decreasing soda and/or juice intake, moderation in carbohydrate intake and increasing intake of lean protein  Exercise recommendations include exercising 3-5 times per week

## 2020-02-18 NOTE — PROGRESS NOTES
Assessment and Plan:     Problem List Items Addressed This Visit        Digestive    Malignant neoplasm of tonsil ([facility] [address] )      Other Visit Diagnoses     Encounter for Medicare annual wellness exam    -  Primary    Encounter for hepatitis C screening test for low risk patient        Relevant Orders    Hepatitis C antibody    Screening for colon cancer        Screening mammogram, encounter for        Relevant Orders    Mammo screening bilateral w 3d & cad    Screening cholesterol level        Relevant Orders    Lipid panel    Depression with anxiety        Relevant Orders    Comprehensive metabolic panel    Impaired fasting glucose        Relevant Orders    HEMOGLOBIN A1C W/ EAG ESTIMATION        BMI Counseling: Body mass index is 27 92 kg/m²  The BMI is above normal  Nutrition recommendations include decreasing portion sizes, encouraging healthy choices of fruits and vegetables, decreasing fast food intake, consuming healthier snacks, limiting drinks that contain sugar and moderation in carbohydrate intake  Exercise recommendations include exercising 3-5 times per week  Preventive health issues were discussed with patient, and age appropriate screening tests were ordered as noted in patient's After Visit Summary  Personalized health advice and appropriate referrals for health education or preventive services given if needed, as noted in patient's After Visit Summary  History of Present Illness:     Patient presents for Medicare Annual Wellness visit    Patient Care Team:  Adrienne freitas PCP - General (Family Medicine)     Problem List:     Patient Active Problem List   Diagnosis    Malignant neoplasm of tonsil [facility])      Past Medical and Surgical History:     Past Medical History:   Diagnosis Date    Anxiety     Cancer ([facility] [address] )     Disease of thyroid gland      Past Surgical History:   Procedure Laterality Date    TONSILLECTOMY        Family History:     History reviewed   No pertinent family history  Social History:        Social History     Socioeconomic History    Marital status:      Spouse name: None    Number of children: None    Years of education: None    Highest education level: None   Occupational History    None   Social Needs    Financial resource strain: None    Food insecurity:     Worry: None     Inability: None    Transportation needs:     Medical: None     Non-medical: None   Tobacco Use    Smoking status: Former Smoker    Smokeless tobacco: Never Used   Substance and Sexual Activity    Alcohol use: None    Drug use: None    Sexual activity: None   Lifestyle    Physical activity:     Days per week: None     Minutes per session: None    Stress: None   Relationships    Social connections:     Talks on phone: None     Gets together: None     Attends Christian service: None     Active member of club or organization: None     Attends meetings of clubs or organizations: None     Relationship status: None    Intimate partner violence:     Fear of current or ex partner: None     Emotionally abused: None     Physically abused: None     Forced sexual activity: None   Other Topics Concern    None   Social History Narrative    None      Medications and Allergies:     Current Outpatient Medications   Medication Sig Dispense Refill    FLUoxetine (PROzac) 20 mg capsule Take 20 mg by mouth daily      levothyroxine 100 mcg tablet Take 100 mcg by mouth daily      LORazepam (ATIVAN) 2 mg tablet Take 2 mg by mouth 3 (three) times a day      Multiple Vitamin (MULTIVITAMIN) tablet Take 1 tablet by mouth daily       No current facility-administered medications for this visit  No Known Allergies   Immunizations: There is no immunization history on file for this patient     Health Maintenance:         Topic Date Due    Hepatitis C Screening  1950    CRC Screening: Colonoscopy  1950         Topic Date Due    DTaP,Tdap,and Td Vaccines (1 - Tdap) 08/31/1962  Pneumococcal Vaccine: 65+ Years (1 of 2 - PCV13) 08/31/2016    Influenza Vaccine  07/01/2019      Medicare Health Risk Assessment:     /88   Pulse 87   Temp 98 2 °F (36 8 °C) (Tympanic)   Resp 16   Ht 5' 6" (1 676 m)   Wt 78 5 kg (173 lb)   SpO2 98%   BMI 27 92 kg/m²      Artie Wolf is here for his Subsequent Wellness visit  Health Risk Assessment:   Patient rates overall health as good  Patient feels that their physical health rating is same  Eyesight was rated as same  Hearing was rated as same  Patient feels that their emotional and mental health rating is same  Pain experienced in the last 7 days has been none  Patient states that he has experienced no weight loss or gain in last 6 months  Depression Screening:   PHQ-2 Score: 0      Fall Risk Screening: In the past year, patient has experienced: no history of falling in past year      Home Safety:  Patient does not have trouble with stairs inside or outside of their home  Patient has working smoke alarms and has working carbon monoxide detector  Home safety hazards include: none  Nutrition:   Current diet is Regular  Medications:   Patient is currently taking over-the-counter supplements  OTC medications include: see medication list  Patient is able to manage medications  Activities of Daily Living (ADLs)/Instrumental Activities of Daily Living (IADLs):   Walk and transfer into and out of bed and chair?: Yes  Dress and groom yourself?: Yes    Bathe or shower yourself?: Yes    Feed yourself? Yes  Do your laundry/housekeeping?: Yes  Manage your money, pay your bills and track your expenses?: Yes  Make your own meals?: Yes    Do your own shopping?: Yes    ADL comments: Lives with boyfriend x 30 years- Kia Arellano    Previous Hospitalizations:   Any hospitalizations or ED visits within the last 12 months?: No      Advance Care Planning:   Living will: No    Durable POA for healthcare: No    Advanced directive counseling given:  Yes PREVENTIVE SCREENINGS      Cardiovascular Screening:      Due for: Lipid Panel      Diabetes Screening:     General: Screening Current    Due for: Blood Glucose      Colorectal Cancer Screening:     General: Screening Current      Prostate Cancer Screening:    General: Screening Not Indicated      Hepatitis C Screening:      Hep C Screening Accepted: Yes        MARY Arreola

## 2020-02-20 ENCOUNTER — TELEPHONE (OUTPATIENT)
Dept: FAMILY MEDICINE CLINIC | Facility: CLINIC | Age: 70
End: 2020-02-20

## 2020-02-20 DIAGNOSIS — I10 HYPERTENSION, UNSPECIFIED TYPE: Primary | ICD-10-CM

## 2020-02-20 RX ORDER — LISINOPRIL 20 MG/1
20 TABLET ORAL DAILY
Qty: 90 TABLET | Refills: 0 | Status: SHIPPED | OUTPATIENT
Start: 2020-02-20 | End: 2020-05-06 | Stop reason: SDUPTHER

## 2020-02-20 NOTE — TELEPHONE ENCOUNTER
Patient called her blood pressures have been running high it usually runs around 157/94   When she woke up this morning it was 171/106  She rechecked it a hour later and it was 157/97    Patient was just seen in office two days ago       pharmacy is [facility] in [place]

## 2020-02-20 NOTE — TELEPHONE ENCOUNTER
Please have her start Lisinopril 20mg daily- return in 2-3 weeks for BP recheck/appointment with me  Thanks!

## 2020-02-24 ENCOUNTER — HOSPITAL ENCOUNTER (OUTPATIENT)
Dept: MAMMOGRAPHY | Facility: HOSPITAL | Age: 70
Discharge: HOME | End: 2020-02-24
Payer: COMMERCIAL

## 2020-02-24 ENCOUNTER — APPOINTMENT (OUTPATIENT)
Dept: LAB | Facility: HOSPITAL | Age: 70
End: 2020-02-24
Payer: COMMERCIAL

## 2020-02-24 VITALS — BODY MASS INDEX: 27.8 KG/M2 | HEIGHT: 66 IN | WEIGHT: 173 LBS

## 2020-02-24 DIAGNOSIS — F41.8 DEPRESSION WITH ANXIETY: ICD-10-CM

## 2020-02-24 DIAGNOSIS — R73.01 IMPAIRED FASTING GLUCOSE: ICD-10-CM

## 2020-02-24 DIAGNOSIS — Z11.59 ENCOUNTER FOR HEPATITIS C SCREENING TEST FOR LOW RISK PATIENT: ICD-10-CM

## 2020-02-24 DIAGNOSIS — Z12.31 SCREENING MAMMOGRAM, ENCOUNTER FOR: ICD-10-CM

## 2020-02-24 DIAGNOSIS — Z13.220 SCREENING CHOLESTEROL LEVEL: ICD-10-CM

## 2020-02-24 LAB
ALBUMIN SERPL BCP-MCNC: 4.5 G/DL (ref 3.5–5.7)
ALP SERPL-CCNC: 67 U/L (ref 55–165)
ALT SERPL W P-5'-P-CCNC: 14 U/L (ref 7–52)
ANION GAP SERPL CALCULATED.3IONS-SCNC: 6 MMOL/L (ref 4–13)
AST SERPL W P-5'-P-CCNC: 20 U/L (ref 13–39)
BILIRUB SERPL-MCNC: 0.4 MG/DL (ref 0.2–1)
BUN SERPL-MCNC: 18 MG/DL (ref 7–25)
CALCIUM SERPL-MCNC: 9.7 MG/DL (ref 8.6–10.5)
CHLORIDE SERPL-SCNC: 102 MMOL/L (ref 98–107)
CHOLEST SERPL-MCNC: 245 MG/DL (ref 0–200)
CO2 SERPL-SCNC: 32 MMOL/L (ref 21–31)
CREAT SERPL-MCNC: 0.82 MG/DL (ref 0.6–1.2)
EST. AVERAGE GLUCOSE BLD GHB EST-MCNC: 103 MG/DL
GFR SERPL CREATININE-BSD FRML MDRD: 74 ML/MIN/1.73SQ M
GLUCOSE P FAST SERPL-MCNC: 90 MG/DL (ref 65–99)
HBA1C MFR BLD: 5.2 %
HCV AB SER QL: NORMAL
HDLC SERPL-MCNC: 63 MG/DL
LDLC SERPL CALC-MCNC: 162 MG/DL (ref 0–100)
NONHDLC SERPL-MCNC: 182 MG/DL
POTASSIUM SERPL-SCNC: 4.6 MMOL/L (ref 3.5–5.5)
PROT SERPL-MCNC: 7.6 G/DL (ref 6.4–8.9)
SODIUM SERPL-SCNC: 140 MMOL/L (ref 134–143)
TRIGL SERPL-MCNC: 101 MG/DL (ref 44–166)

## 2020-02-24 PROCEDURE — 36415 COLL VENOUS BLD VENIPUNCTURE: CPT

## 2020-02-24 PROCEDURE — 86803 HEPATITIS C AB TEST: CPT

## 2020-02-24 PROCEDURE — 77067 SCR MAMMO BI INCL CAD: CPT

## 2020-02-24 PROCEDURE — 77063 BREAST TOMOSYNTHESIS BI: CPT

## 2020-02-24 PROCEDURE — 80061 LIPID PANEL: CPT

## 2020-02-24 PROCEDURE — 80053 COMPREHEN METABOLIC PANEL: CPT

## 2020-02-24 PROCEDURE — 83036 HEMOGLOBIN GLYCOSYLATED A1C: CPT

## 2020-05-06 DIAGNOSIS — I10 HYPERTENSION, UNSPECIFIED TYPE: ICD-10-CM

## 2020-05-06 DIAGNOSIS — F41.8 DEPRESSION WITH ANXIETY: ICD-10-CM

## 2020-05-07 RX ORDER — LISINOPRIL 20 MG/1
20 TABLET ORAL DAILY
Qty: 90 TABLET | Refills: 0 | Status: SHIPPED | OUTPATIENT
Start: 2020-05-07 | End: 2020-08-18 | Stop reason: SDUPTHER

## 2020-05-07 RX ORDER — FLUOXETINE HYDROCHLORIDE 20 MG/1
20 CAPSULE ORAL DAILY
Qty: 90 CAPSULE | Refills: 3 | Status: SHIPPED | OUTPATIENT
Start: 2020-05-07 | End: 2020-08-19 | Stop reason: SDUPTHER

## 2020-05-07 RX ORDER — LORAZEPAM 2 MG/1
2 TABLET ORAL 3 TIMES DAILY
Qty: 90 TABLET | Refills: 2 | Status: SHIPPED | OUTPATIENT
Start: 2020-05-07 | End: 2020-09-04 | Stop reason: SDUPTHER

## 2020-06-18 ENCOUNTER — APPOINTMENT (OUTPATIENT)
Dept: LAB | Facility: CLINIC | Age: 70
End: 2020-06-18
Payer: COMMERCIAL

## 2020-06-18 ENCOUNTER — OFFICE VISIT (OUTPATIENT)
Dept: FAMILY MEDICINE CLINIC | Facility: CLINIC | Age: 70
End: 2020-06-18
Payer: COMMERCIAL

## 2020-06-18 VITALS
TEMPERATURE: 98.4 F | SYSTOLIC BLOOD PRESSURE: 116 MMHG | WEIGHT: 168 LBS | BODY MASS INDEX: 27 KG/M2 | HEIGHT: 66 IN | HEART RATE: 74 BPM | DIASTOLIC BLOOD PRESSURE: 68 MMHG | OXYGEN SATURATION: 98 % | RESPIRATION RATE: 18 BRPM

## 2020-06-18 DIAGNOSIS — E55.9 VITAMIN D DEFICIENCY: ICD-10-CM

## 2020-06-18 DIAGNOSIS — E03.9 ACQUIRED HYPOTHYROIDISM: Primary | ICD-10-CM

## 2020-06-18 DIAGNOSIS — I10 HYPERTENSION, UNSPECIFIED TYPE: ICD-10-CM

## 2020-06-18 DIAGNOSIS — E03.9 ACQUIRED HYPOTHYROIDISM: ICD-10-CM

## 2020-06-18 DIAGNOSIS — R21 RASH AND NONSPECIFIC SKIN ERUPTION: ICD-10-CM

## 2020-06-18 LAB
25(OH)D3 SERPL-MCNC: 51.1 NG/ML (ref 30–100)
ALBUMIN SERPL BCP-MCNC: 4.1 G/DL (ref 3.5–5)
ALP SERPL-CCNC: 152 U/L (ref 46–116)
ALT SERPL W P-5'-P-CCNC: 20 U/L (ref 12–78)
ANION GAP SERPL CALCULATED.3IONS-SCNC: 1 MMOL/L (ref 4–13)
AST SERPL W P-5'-P-CCNC: 14 U/L (ref 5–45)
BILIRUB SERPL-MCNC: 0.29 MG/DL (ref 0.2–1)
BUN SERPL-MCNC: 18 MG/DL (ref 5–25)
CALCIUM SERPL-MCNC: 9.7 MG/DL (ref 8.3–10.1)
CHLORIDE SERPL-SCNC: 107 MMOL/L (ref 100–108)
CO2 SERPL-SCNC: 30 MMOL/L (ref 21–32)
CREAT SERPL-MCNC: 0.89 MG/DL (ref 0.6–1.3)
GFR SERPL CREATININE-BSD FRML MDRD: 67 ML/MIN/1.73SQ M
GLUCOSE P FAST SERPL-MCNC: 86 MG/DL (ref 65–99)
POTASSIUM SERPL-SCNC: 4.8 MMOL/L (ref 3.5–5.3)
PROT SERPL-MCNC: 8 G/DL (ref 6.4–8.2)
SODIUM SERPL-SCNC: 138 MMOL/L (ref 136–145)
T4 FREE SERPL-MCNC: 1.23 NG/DL (ref 0.76–1.46)
TSH SERPL DL<=0.05 MIU/L-ACNC: 0.35 UIU/ML (ref 0.36–3.74)

## 2020-06-18 PROCEDURE — 84443 ASSAY THYROID STIM HORMONE: CPT

## 2020-06-18 PROCEDURE — 80053 COMPREHEN METABOLIC PANEL: CPT

## 2020-06-18 PROCEDURE — 84439 ASSAY OF FREE THYROXINE: CPT

## 2020-06-18 PROCEDURE — 3008F BODY MASS INDEX DOCD: CPT | Performed by: NURSE PRACTITIONER

## 2020-06-18 PROCEDURE — 36415 COLL VENOUS BLD VENIPUNCTURE: CPT

## 2020-06-18 PROCEDURE — 3074F SYST BP LT 130 MM HG: CPT | Performed by: NURSE PRACTITIONER

## 2020-06-18 PROCEDURE — 1036F TOBACCO NON-USER: CPT | Performed by: NURSE PRACTITIONER

## 2020-06-18 PROCEDURE — 3078F DIAST BP <80 MM HG: CPT | Performed by: NURSE PRACTITIONER

## 2020-06-18 PROCEDURE — 99214 OFFICE O/P EST MOD 30 MIN: CPT | Performed by: NURSE PRACTITIONER

## 2020-06-18 PROCEDURE — 1160F RVW MEDS BY RX/DR IN RCRD: CPT | Performed by: NURSE PRACTITIONER

## 2020-06-18 PROCEDURE — 82306 VITAMIN D 25 HYDROXY: CPT

## 2020-06-18 RX ORDER — TRIAMCINOLONE ACETONIDE 5 MG/G
CREAM TOPICAL 3 TIMES DAILY
Qty: 454 G | Refills: 0 | Status: SHIPPED | OUTPATIENT
Start: 2020-06-18 | End: 2021-05-20

## 2020-06-18 RX ORDER — PHENOL 1.4 %
AEROSOL, SPRAY (ML) MUCOUS MEMBRANE
COMMUNITY

## 2020-06-18 RX ORDER — ASPIRIN 325 MG
81 TABLET ORAL DAILY
Status: ON HOLD | COMMUNITY
End: 2021-08-27 | Stop reason: CLARIF

## 2020-06-18 RX ORDER — MULTIVIT-MIN/IRON/FOLIC ACID/K 18-600-40
CAPSULE ORAL DAILY
COMMUNITY

## 2020-06-19 ENCOUNTER — TELEPHONE (OUTPATIENT)
Dept: FAMILY MEDICINE CLINIC | Facility: CLINIC | Age: 70
End: 2020-06-19

## 2020-06-19 DIAGNOSIS — R74.8 ELEVATED LIVER ENZYMES: Primary | ICD-10-CM

## 2020-06-19 DIAGNOSIS — E03.9 ACQUIRED HYPOTHYROIDISM: ICD-10-CM

## 2020-08-18 DIAGNOSIS — I10 HYPERTENSION, UNSPECIFIED TYPE: ICD-10-CM

## 2020-08-18 DIAGNOSIS — E03.9 ACQUIRED HYPOTHYROIDISM: Primary | ICD-10-CM

## 2020-08-18 RX ORDER — LEVOTHYROXINE SODIUM 0.1 MG/1
100 TABLET ORAL DAILY
Qty: 90 TABLET | Refills: 1 | Status: SHIPPED | OUTPATIENT
Start: 2020-08-18 | End: 2020-12-10

## 2020-08-18 RX ORDER — LISINOPRIL 20 MG/1
20 TABLET ORAL DAILY
Qty: 90 TABLET | Refills: 1 | Status: SHIPPED | OUTPATIENT
Start: 2020-08-18 | End: 2021-02-10 | Stop reason: SDUPTHER

## 2020-08-18 NOTE — TELEPHONE ENCOUNTER
Received call from patient requesting refill of Lisinopril 20mg and Synthroid 100mcg to be sent to [facility]  Please advise

## 2020-08-19 DIAGNOSIS — F41.8 DEPRESSION WITH ANXIETY: ICD-10-CM

## 2020-08-20 RX ORDER — FLUOXETINE HYDROCHLORIDE 20 MG/1
20 CAPSULE ORAL DAILY
Qty: 90 CAPSULE | Refills: 3 | Status: SHIPPED | OUTPATIENT
Start: 2020-08-20 | End: 2020-12-08 | Stop reason: SDUPTHER

## 2020-09-04 DIAGNOSIS — F41.8 DEPRESSION WITH ANXIETY: ICD-10-CM

## 2020-09-04 RX ORDER — LORAZEPAM 2 MG/1
2 TABLET ORAL 2 TIMES DAILY PRN
Qty: 60 TABLET | Refills: 2 | Status: SHIPPED | OUTPATIENT
Start: 2020-09-04 | End: 2020-12-10

## 2020-12-07 ENCOUNTER — LAB (OUTPATIENT)
Dept: LAB | Facility: CLINIC | Age: 70
End: 2020-12-07
Payer: COMMERCIAL

## 2020-12-07 DIAGNOSIS — E03.9 ACQUIRED HYPOTHYROIDISM: ICD-10-CM

## 2020-12-07 DIAGNOSIS — R74.8 ELEVATED LIVER ENZYMES: ICD-10-CM

## 2020-12-07 LAB
BASOPHILS # BLD AUTO: 0.05 THOUSANDS/ΜL (ref 0–0.1)
BASOPHILS NFR BLD AUTO: 1 % (ref 0–1)
EOSINOPHIL # BLD AUTO: 0.19 THOUSAND/ΜL (ref 0–0.61)
EOSINOPHIL NFR BLD AUTO: 4 % (ref 0–6)
ERYTHROCYTE [DISTWIDTH] IN BLOOD BY AUTOMATED COUNT: 12.1 % (ref 11.6–15.1)
HCT VFR BLD AUTO: 40.2 % (ref 34.8–46.1)
HGB BLD-MCNC: 13.1 G/DL (ref 11.5–15.4)
IMM GRANULOCYTES # BLD AUTO: 0.02 THOUSAND/UL (ref 0–0.2)
IMM GRANULOCYTES NFR BLD AUTO: 0 % (ref 0–2)
LYMPHOCYTES # BLD AUTO: 1.08 THOUSANDS/ΜL (ref 0.6–4.47)
LYMPHOCYTES NFR BLD AUTO: 24 % (ref 14–44)
MCH RBC QN AUTO: 31.2 PG (ref 26.8–34.3)
MCHC RBC AUTO-ENTMCNC: 32.6 G/DL (ref 31.4–37.4)
MCV RBC AUTO: 96 FL (ref 82–98)
MONOCYTES # BLD AUTO: 0.31 THOUSAND/ΜL (ref 0.17–1.22)
MONOCYTES NFR BLD AUTO: 7 % (ref 4–12)
NEUTROPHILS # BLD AUTO: 2.95 THOUSANDS/ΜL (ref 1.85–7.62)
NEUTS SEG NFR BLD AUTO: 64 % (ref 43–75)
NRBC BLD AUTO-RTO: 0 /100 WBCS
PLATELET # BLD AUTO: 236 THOUSANDS/UL (ref 149–390)
PMV BLD AUTO: 10.5 FL (ref 8.9–12.7)
RBC # BLD AUTO: 4.2 MILLION/UL (ref 3.81–5.12)
TSH SERPL DL<=0.05 MIU/L-ACNC: 1.05 UIU/ML (ref 0.36–3.74)
WBC # BLD AUTO: 4.6 THOUSAND/UL (ref 4.31–10.16)

## 2020-12-07 PROCEDURE — 84443 ASSAY THYROID STIM HORMONE: CPT

## 2020-12-07 PROCEDURE — 85025 COMPLETE CBC W/AUTO DIFF WBC: CPT

## 2020-12-07 PROCEDURE — 36415 COLL VENOUS BLD VENIPUNCTURE: CPT

## 2020-12-08 ENCOUNTER — OFFICE VISIT (OUTPATIENT)
Dept: FAMILY MEDICINE CLINIC | Facility: CLINIC | Age: 70
End: 2020-12-08
Payer: COMMERCIAL

## 2020-12-08 VITALS
TEMPERATURE: 97.6 F | BODY MASS INDEX: 26.2 KG/M2 | OXYGEN SATURATION: 98 % | RESPIRATION RATE: 16 BRPM | WEIGHT: 163 LBS | HEIGHT: 66 IN | SYSTOLIC BLOOD PRESSURE: 130 MMHG | HEART RATE: 70 BPM | DIASTOLIC BLOOD PRESSURE: 66 MMHG

## 2020-12-08 DIAGNOSIS — E03.9 ACQUIRED HYPOTHYROIDISM: ICD-10-CM

## 2020-12-08 DIAGNOSIS — F33.1 MODERATE EPISODE OF RECURRENT MAJOR DEPRESSIVE DISORDER (HCC): Primary | ICD-10-CM

## 2020-12-08 PROBLEM — F33.9 DEPRESSION, RECURRENT (HCC): Status: ACTIVE | Noted: 2020-12-08

## 2020-12-08 PROCEDURE — 3075F SYST BP GE 130 - 139MM HG: CPT | Performed by: INTERNAL MEDICINE

## 2020-12-08 PROCEDURE — 3008F BODY MASS INDEX DOCD: CPT | Performed by: INTERNAL MEDICINE

## 2020-12-08 PROCEDURE — 3078F DIAST BP <80 MM HG: CPT | Performed by: INTERNAL MEDICINE

## 2020-12-08 PROCEDURE — 1036F TOBACCO NON-USER: CPT | Performed by: INTERNAL MEDICINE

## 2020-12-08 PROCEDURE — 99214 OFFICE O/P EST MOD 30 MIN: CPT | Performed by: INTERNAL MEDICINE

## 2020-12-08 PROCEDURE — 1160F RVW MEDS BY RX/DR IN RCRD: CPT | Performed by: INTERNAL MEDICINE

## 2020-12-08 PROCEDURE — 3725F SCREEN DEPRESSION PERFORMED: CPT | Performed by: INTERNAL MEDICINE

## 2020-12-08 RX ORDER — FLUOXETINE HYDROCHLORIDE 40 MG/1
40 CAPSULE ORAL DAILY
Qty: 30 CAPSULE | Refills: 1 | Status: SHIPPED | OUTPATIENT
Start: 2020-12-08 | End: 2021-01-04 | Stop reason: SDUPTHER

## 2020-12-08 RX ORDER — MOMETASONE FUROATE 1 MG/G
OINTMENT TOPICAL DAILY
COMMUNITY
End: 2021-08-30 | Stop reason: HOSPADM

## 2020-12-08 RX ORDER — CHOLECALCIFEROL (VITAMIN D3) 125 MCG
5000 CAPSULE ORAL DAILY
COMMUNITY

## 2020-12-08 RX ORDER — KETOCONAZOLE 20 MG/ML
SHAMPOO TOPICAL
COMMUNITY
End: 2021-08-30 | Stop reason: HOSPADM

## 2020-12-10 DIAGNOSIS — F41.8 DEPRESSION WITH ANXIETY: ICD-10-CM

## 2020-12-10 DIAGNOSIS — E03.9 ACQUIRED HYPOTHYROIDISM: ICD-10-CM

## 2020-12-10 RX ORDER — LORAZEPAM 2 MG/1
TABLET ORAL
Qty: 60 TABLET | Refills: 2 | Status: SHIPPED | OUTPATIENT
Start: 2020-12-10 | End: 2021-03-12 | Stop reason: SDUPTHER

## 2020-12-10 RX ORDER — LEVOTHYROXINE SODIUM 0.1 MG/1
TABLET ORAL
Qty: 90 TABLET | Refills: 1 | Status: SHIPPED | OUTPATIENT
Start: 2020-12-10 | End: 2021-05-20 | Stop reason: SDUPTHER

## 2021-01-04 DIAGNOSIS — F33.1 MODERATE EPISODE OF RECURRENT MAJOR DEPRESSIVE DISORDER (HCC): ICD-10-CM

## 2021-01-04 RX ORDER — FLUOXETINE HYDROCHLORIDE 40 MG/1
40 CAPSULE ORAL DAILY
Qty: 30 CAPSULE | Refills: 1 | Status: SHIPPED | OUTPATIENT
Start: 2021-01-04 | End: 2021-01-27

## 2021-01-08 ENCOUNTER — OFFICE VISIT (OUTPATIENT)
Dept: FAMILY MEDICINE CLINIC | Facility: CLINIC | Age: 71
End: 2021-01-08
Payer: COMMERCIAL

## 2021-01-08 VITALS
OXYGEN SATURATION: 99 % | RESPIRATION RATE: 20 BRPM | HEART RATE: 70 BPM | DIASTOLIC BLOOD PRESSURE: 84 MMHG | HEIGHT: 66 IN | BODY MASS INDEX: 25.75 KG/M2 | WEIGHT: 160.2 LBS | TEMPERATURE: 98.6 F | SYSTOLIC BLOOD PRESSURE: 136 MMHG

## 2021-01-08 DIAGNOSIS — Z13.220 SCREENING CHOLESTEROL LEVEL: ICD-10-CM

## 2021-01-08 DIAGNOSIS — F41.9 ANXIETY: Primary | ICD-10-CM

## 2021-01-08 DIAGNOSIS — E03.9 ACQUIRED HYPOTHYROIDISM: ICD-10-CM

## 2021-01-08 DIAGNOSIS — C09.9 SQUAMOUS CELL CARCINOMA OF RIGHT TONSIL (HCC): ICD-10-CM

## 2021-01-08 DIAGNOSIS — F33.1 MODERATE EPISODE OF RECURRENT MAJOR DEPRESSIVE DISORDER (HCC): ICD-10-CM

## 2021-01-08 PROCEDURE — 99214 OFFICE O/P EST MOD 30 MIN: CPT | Performed by: NURSE PRACTITIONER

## 2021-01-08 PROCEDURE — 3008F BODY MASS INDEX DOCD: CPT | Performed by: NURSE PRACTITIONER

## 2021-01-08 PROCEDURE — 3725F SCREEN DEPRESSION PERFORMED: CPT | Performed by: NURSE PRACTITIONER

## 2021-01-08 PROCEDURE — 1160F RVW MEDS BY RX/DR IN RCRD: CPT | Performed by: NURSE PRACTITIONER

## 2021-01-08 PROCEDURE — 1036F TOBACCO NON-USER: CPT | Performed by: NURSE PRACTITIONER

## 2021-01-08 NOTE — PATIENT INSTRUCTIONS
Continue Prozac 40mg daily and Ativan as directed- no more than twice daily, consider adding on Buspar if anxiety increases  Get labs before next appointment  Return for Medicare Wellness visit in February as scheduled  Call sooner for problems/concerns

## 2021-01-08 NOTE — PROGRESS NOTES
[facility]        NAME: Viktoria Lucia is a 72 y o  female  : 1950    MRN: [id number]  DATE: 2021  TIME: 9:58 AM    Assessment and Plan   Anxiety [F41 9]  1  Anxiety  Comprehensive metabolic panel   2  Moderate episode of recurrent major depressive disorder ([facility] [address] )     3  Squamous cell carcinoma of right tonsil ([facility] [address] )     4  Acquired hypothyroidism  TSH, 3rd generation with Free T4 reflex   5  Screening cholesterol level  Lipid panel         Patient Instructions     Patient Instructions   Continue Prozac 40mg daily and Ativan as directed- no more than twice daily, consider adding on Buspar if anxiety increases  Get labs before next appointment  Return for Medicare Wellness visit in February as scheduled  Call sooner for problems/concerns          Chief Complaint     Chief Complaint   Patient presents with    Follow-up         History of Present Illness       Here for follow up of increased Prozac to 40mg for increased anxiety- symptoms have improved, has also stopped watching 2 young grandkids  Also uses Ativan at bedtime and occasionally once a day if needed for increased stress  Discussed adding Buspar- patient agreeable to consider in the future but does not feel she needs this now  Seen by Dermatologist for lesions on head- diagnosed with adult acne- given shampoo- stopped using because it is expensive      Review of Systems   Review of Systems   Constitutional: Negative for activity change, diaphoresis, fatigue and fever  HENT: Negative for congestion, facial swelling, hearing loss, rhinorrhea, sinus pressure, sinus pain, sneezing, sore throat and voice change  Eyes: Negative for discharge and visual disturbance  Respiratory: Negative for cough, choking, chest tightness, shortness of breath, wheezing and stridor  Cardiovascular: Negative for chest pain, palpitations and leg swelling     Gastrointestinal: Negative for abdominal distention, abdominal pain, constipation, diarrhea, nausea and vomiting  Endocrine: Negative for polydipsia, polyphagia and polyuria  Genitourinary: Negative for difficulty urinating, dysuria, frequency and urgency  Musculoskeletal: Negative for arthralgias, back pain, gait problem, joint swelling, myalgias, neck pain and neck stiffness  Skin: Negative for color change, rash and wound  Neurological: Negative for dizziness, syncope, speech difficulty, weakness, light-headedness and headaches  Hematological: Negative for adenopathy  Does not bruise/bleed easily  Psychiatric/Behavioral: Negative for agitation, behavioral problems, confusion, hallucinations, sleep disturbance and suicidal ideas  The patient is nervous/anxious (improving)            Current Medications       Current Outpatient Medications:     Ascorbic Acid (LETICIA-C PO), Take 1,000 mg by mouth daily, Disp: , Rfl:     aspirin 325 mg tablet, Take 81 mg by mouth daily , Disp: , Rfl:     ASPIRIN 81 PO, Take 81 mg by mouth daily, Disp: , Rfl:     FLUoxetine (PROzac) 40 MG capsule, Take 1 capsule (40 mg total) by mouth daily, Disp: 30 capsule, Rfl: 1    ketoconazole (NIZORAL) 2 % shampoo, Apply topically, Disp: , Rfl:     levothyroxine 100 mcg tablet, TAKE 1 TABLET DAILY, Disp: 90 tablet, Rfl: 1    lisinopril (ZESTRIL) 20 mg tablet, Take 1 tablet (20 mg total) by mouth daily, Disp: 90 tablet, Rfl: 1    LORazepam (ATIVAN) 2 mg tablet, TAKE 1 TABLET BY MOUTH TWICE A DAY AS NEEDED FOR ANXIETY, Disp: 60 tablet, Rfl: 2    Melatonin 10 MG TABS, Take by mouth once at bedtime, Disp: , Rfl:     mometasone (ELOCON) 0 1 % ointment, Apply topically daily, Disp: , Rfl:     Multiple Vitamin (MULTIVITAMIN) tablet, Take 1 tablet by mouth daily, Disp: , Rfl:     MUPIROCIN EX, Apply topically daily, Disp: , Rfl:     ofloxacin (FLOXIN) 0 3 % otic solution, Administer 5 drops into both ears 2 (two) times a day, Disp: 10 mL, Rfl: 1    ofloxacin (FLOXIN) 0 3 % otic solution, Administer 5 drops into both ears 2 (two) times a day, Disp: 10 mL, Rfl: 5    triamcinolone (KENALOG) 0 5 % cream, Apply topically 3 (three) times a day (Patient not taking: Reported on 12/8/2020), Disp: 454 g, Rfl: 0    TURMERIC PO, Take 1,000 mg by mouth daily, Disp: , Rfl:     vitamin B-12 (VITAMIN B-12) 500 mcg tablet, Take 5,000 mcg by mouth daily, Disp: , Rfl:     Vitamin D, Cholecalciferol, 50 MCG (2000 UT) CAPS, Take by mouth daily, Disp: , Rfl:     Current Allergies     Allergies as of 01/08/2021    (No Known Allergies)            The following portions of the patient's history were reviewed and updated as appropriate: allergies, current medications, past family history, past medical history, past social history, past surgical history and problem list      Past Medical History:   Diagnosis Date    Anxiety     Cancer ([facility] [address] ) ~2009    Tonsils- followed by Oncology( Dr Aurora Dao, Dr Meredith Fernando)    Disease of thyroid gland        Past Surgical History:   Procedure Laterality Date    TONSILLECTOMY      Dr Iban Avila       Family History   Problem Relation Age of Onset    No Known Problems Mother     No Known Problems Father     Breast cancer Sister 27    No Known Problems Daughter     No Known Problems Maternal Grandmother     No Known Problems Paternal Grandmother     No Known Problems Sister     No Known Problems Sister     No Known Problems Sister     No Known Problems Maternal Aunt     No Known Problems Maternal Aunt          Medications have been verified  Objective   /84   Pulse 70   Temp 98 6 °F (37 °C) (Tympanic)   Resp 20   Ht 5' 6" (1 676 m)   Wt 72 7 kg (160 lb 3 2 oz)   SpO2 99%   BMI 25 86 kg/m²        Physical Exam     Physical Exam  Vitals signs and nursing note reviewed  Constitutional:       General: She is not in acute distress  Appearance: She is well-developed  She is not diaphoretic  Neck:      Musculoskeletal: Normal range of motion and neck supple  Thyroid: No thyromegaly  Trachea: No tracheal deviation  Cardiovascular:      Rate and Rhythm: Normal rate and regular rhythm  Heart sounds: Normal heart sounds  No murmur  Pulmonary:      Effort: Pulmonary effort is normal  No respiratory distress  Breath sounds: Normal breath sounds  No wheezing  Musculoskeletal: Normal range of motion  General: No tenderness or deformity  Skin:     General: Skin is warm and dry  Findings: No erythema or rash  Neurological:      Mental Status: She is alert and oriented to person, place, and time  Psychiatric:         Attention and Perception: Attention normal          Mood and Affect: Affect normal  Mood is anxious  Speech: Speech normal          Behavior: Behavior normal  Behavior is cooperative  Thought Content:  Thought content normal          Cognition and Memory: Cognition and memory normal          Judgment: Judgment normal

## 2021-01-27 DIAGNOSIS — F33.1 MODERATE EPISODE OF RECURRENT MAJOR DEPRESSIVE DISORDER (HCC): ICD-10-CM

## 2021-01-27 RX ORDER — FLUOXETINE HYDROCHLORIDE 40 MG/1
CAPSULE ORAL
Qty: 30 CAPSULE | Refills: 1 | Status: SHIPPED | OUTPATIENT
Start: 2021-01-27 | End: 2021-02-21

## 2021-02-10 DIAGNOSIS — I10 HYPERTENSION, UNSPECIFIED TYPE: ICD-10-CM

## 2021-02-11 RX ORDER — LISINOPRIL 20 MG/1
20 TABLET ORAL DAILY
Qty: 90 TABLET | Refills: 1 | Status: SHIPPED | OUTPATIENT
Start: 2021-02-11 | End: 2021-08-16 | Stop reason: SDUPTHER

## 2021-02-17 ENCOUNTER — LAB (OUTPATIENT)
Dept: LAB | Facility: CLINIC | Age: 71
End: 2021-02-17
Payer: COMMERCIAL

## 2021-02-17 DIAGNOSIS — E03.9 ACQUIRED HYPOTHYROIDISM: ICD-10-CM

## 2021-02-17 DIAGNOSIS — F41.9 ANXIETY: ICD-10-CM

## 2021-02-17 DIAGNOSIS — Z13.220 SCREENING CHOLESTEROL LEVEL: ICD-10-CM

## 2021-02-17 LAB
ALBUMIN SERPL BCP-MCNC: 3.8 G/DL (ref 3.5–5)
ALP SERPL-CCNC: 91 U/L (ref 46–116)
ALT SERPL W P-5'-P-CCNC: 23 U/L (ref 12–78)
ANION GAP SERPL CALCULATED.3IONS-SCNC: 4 MMOL/L (ref 4–13)
AST SERPL W P-5'-P-CCNC: 21 U/L (ref 5–45)
BILIRUB SERPL-MCNC: 0.66 MG/DL (ref 0.2–1)
BUN SERPL-MCNC: 15 MG/DL (ref 5–25)
CALCIUM SERPL-MCNC: 9.4 MG/DL (ref 8.3–10.1)
CHLORIDE SERPL-SCNC: 110 MMOL/L (ref 100–108)
CHOLEST SERPL-MCNC: 229 MG/DL (ref 50–200)
CO2 SERPL-SCNC: 30 MMOL/L (ref 21–32)
CREAT SERPL-MCNC: 0.85 MG/DL (ref 0.6–1.3)
GFR SERPL CREATININE-BSD FRML MDRD: 70 ML/MIN/1.73SQ M
GLUCOSE P FAST SERPL-MCNC: 92 MG/DL (ref 65–99)
HDLC SERPL-MCNC: 74 MG/DL
LDLC SERPL CALC-MCNC: 147 MG/DL (ref 0–100)
NONHDLC SERPL-MCNC: 155 MG/DL
POTASSIUM SERPL-SCNC: 4.4 MMOL/L (ref 3.5–5.3)
PROT SERPL-MCNC: 7.5 G/DL (ref 6.4–8.2)
SODIUM SERPL-SCNC: 144 MMOL/L (ref 136–145)
TRIGL SERPL-MCNC: 39 MG/DL
TSH SERPL DL<=0.05 MIU/L-ACNC: 0.64 UIU/ML (ref 0.36–3.74)

## 2021-02-17 PROCEDURE — 80053 COMPREHEN METABOLIC PANEL: CPT

## 2021-02-17 PROCEDURE — 80061 LIPID PANEL: CPT

## 2021-02-17 PROCEDURE — 84443 ASSAY THYROID STIM HORMONE: CPT

## 2021-02-17 PROCEDURE — 36415 COLL VENOUS BLD VENIPUNCTURE: CPT

## 2021-02-19 ENCOUNTER — OFFICE VISIT (OUTPATIENT)
Dept: FAMILY MEDICINE CLINIC | Facility: CLINIC | Age: 71
End: 2021-02-19
Payer: COMMERCIAL

## 2021-02-19 VITALS
RESPIRATION RATE: 18 BRPM | SYSTOLIC BLOOD PRESSURE: 128 MMHG | WEIGHT: 156.2 LBS | HEIGHT: 66 IN | DIASTOLIC BLOOD PRESSURE: 74 MMHG | HEART RATE: 80 BPM | BODY MASS INDEX: 25.1 KG/M2 | OXYGEN SATURATION: 98 % | TEMPERATURE: 98.5 F

## 2021-02-19 DIAGNOSIS — F41.9 ANXIETY: Primary | ICD-10-CM

## 2021-02-19 DIAGNOSIS — E66.3 OVERWEIGHT WITH BODY MASS INDEX (BMI) OF 25 TO 25.9 IN ADULT: ICD-10-CM

## 2021-02-19 DIAGNOSIS — F33.9 DEPRESSION, RECURRENT (HCC): ICD-10-CM

## 2021-02-19 DIAGNOSIS — E03.9 ACQUIRED HYPOTHYROIDISM: ICD-10-CM

## 2021-02-19 DIAGNOSIS — Z23 NEED FOR VACCINATION: ICD-10-CM

## 2021-02-19 DIAGNOSIS — Z00.00 ENCOUNTER FOR MEDICARE ANNUAL WELLNESS EXAM: ICD-10-CM

## 2021-02-19 PROCEDURE — G0009 ADMIN PNEUMOCOCCAL VACCINE: HCPCS

## 2021-02-19 PROCEDURE — 1125F AMNT PAIN NOTED PAIN PRSNT: CPT | Performed by: NURSE PRACTITIONER

## 2021-02-19 PROCEDURE — 99213 OFFICE O/P EST LOW 20 MIN: CPT | Performed by: NURSE PRACTITIONER

## 2021-02-19 PROCEDURE — 90670 PCV13 VACCINE IM: CPT

## 2021-02-19 PROCEDURE — 3725F SCREEN DEPRESSION PERFORMED: CPT | Performed by: NURSE PRACTITIONER

## 2021-02-19 PROCEDURE — 3008F BODY MASS INDEX DOCD: CPT | Performed by: NURSE PRACTITIONER

## 2021-02-19 PROCEDURE — 1160F RVW MEDS BY RX/DR IN RCRD: CPT | Performed by: NURSE PRACTITIONER

## 2021-02-19 PROCEDURE — G0439 PPPS, SUBSEQ VISIT: HCPCS | Performed by: NURSE PRACTITIONER

## 2021-02-19 PROCEDURE — 4040F PNEUMOC VAC/ADMIN/RCVD: CPT | Performed by: NURSE PRACTITIONER

## 2021-02-19 PROCEDURE — 1036F TOBACCO NON-USER: CPT | Performed by: NURSE PRACTITIONER

## 2021-02-19 PROCEDURE — 1170F FXNL STATUS ASSESSED: CPT | Performed by: NURSE PRACTITIONER

## 2021-02-19 PROCEDURE — 3288F FALL RISK ASSESSMENT DOCD: CPT | Performed by: NURSE PRACTITIONER

## 2021-02-19 RX ORDER — BUSPIRONE HYDROCHLORIDE 5 MG/1
5 TABLET ORAL 2 TIMES DAILY
Qty: 180 TABLET | Refills: 1 | Status: SHIPPED | OUTPATIENT
Start: 2021-02-19 | End: 2021-08-13

## 2021-02-19 NOTE — PATIENT INSTRUCTIONS
Start with 5mg twice daily, can increase if needed (MAX dose per day is 45mg), irene in 3 months    Medicare Preventive Visit Patient Instructions  Thank you for completing your Welcome to Medicare Visit or Medicare Annual Wellness Visit today  Your next wellness visit will be due in one year (2/19/2022)  The screening/preventive services that you may require over the next 5-10 years are detailed below  Some tests may not apply to you based off risk factors and/or age  Screening tests ordered at today's visit but not completed yet may show as past due  Also, please note that scanned in results may not display below  Preventive Screenings:  Service Recommendations Previous Testing/Comments   Colorectal Cancer Screening  * Colonoscopy    * Fecal Occult Blood Test (FOBT)/Fecal Immunochemical Test (FIT)  * Fecal DNA/Cologuard Test  * Flexible Sigmoidoscopy Age: 54-65 years old   Colonoscopy: every 10 years (may be performed more frequently if at higher risk)  OR  FOBT/FIT: every 1 year  OR  Cologuard: every 3 years  OR  Sigmoidoscopy: every 5 years  Screening may be recommended earlier than age 48 if at higher risk for colorectal cancer  Also, an individualized decision between you and your healthcare provider will decide whether screening between the ages of 74-80 would be appropriate  Colonoscopy: 10/11/2012  FOBT/FIT: Not on file  Cologuard: Not on file  Sigmoidoscopy: Not on file    Screening Current     Breast Cancer Screening Age: 36 years old  Frequency: every 1-2 years  Not required if history of left and right mastectomy Mammogram: 02/24/2020    Screening Current   Cervical Cancer Screening Between the ages of 21-29, pap smear recommended once every 3 years  Between the ages of 33-67, can perform pap smear with HPV co-testing every 5 years     Recommendations may differ for women with a history of total hysterectomy, cervical cancer, or abnormal pap smears in past  Pap Smear: Not on file    Screening Not Indicated   Hepatitis C Screening Once for adults born between 1945 and 1965  More frequently in patients at high risk for Hepatitis C Hep C Antibody: 02/24/2020    Screening Current   Diabetes Screening 1-2 times per year if you're at risk for diabetes or have pre-diabetes Fasting glucose: 92 mg/dL   A1C: 5 2 %    Screening Current   Cholesterol Screening Once every 5 years if you don't have a lipid disorder  May order more often based on risk factors  Lipid panel: 02/17/2021    Screening Current     Other Preventive Screenings Covered by Medicare:  1  Abdominal Aortic Aneurysm (AAA) Screening: covered once if your at risk  You're considered to be at risk if you have a family history of AAA  2  Lung Cancer Screening: covers low dose CT scan once per year if you meet all of the following conditions: (1) Age 50-69; (2) No signs or symptoms of lung cancer; (3) Current smoker or have quit smoking within the last 15 years; (4) You have a tobacco smoking history of at least 30 pack years (packs per day multiplied by number of years you smoked); (5) You get a written order from a healthcare provider  3  Glaucoma Screening: covered annually if you're considered high risk: (1) You have diabetes OR (2) Family history of glaucoma OR (3)  aged 48 and older OR (3)  American aged 72 and older  3  Osteoporosis Screening: covered every 2 years if you meet one of the following conditions: (1) You're estrogen deficient and at risk for osteoporosis based off medical history and other findings; (2) Have a vertebral abnormality; (3) On glucocorticoid therapy for more than 3 months; (4) Have primary hyperparathyroidism; (5) On osteoporosis medications and need to assess response to drug therapy  · Last bone density test (DXA Scan): Not on file  5  HIV Screening: covered annually if you're between the age of 12-76   Also covered annually if you are younger than 13 and older than 72 with risk factors for HIV infection  For pregnant patients, it is covered up to 3 times per pregnancy  Immunizations:  Immunization Recommendations   Influenza Vaccine Annual influenza vaccination during flu season is recommended for all persons aged >= 6 months who do not have contraindications   Pneumococcal Vaccine (Prevnar and Pneumovax)  * Prevnar = PCV13  * Pneumovax = PPSV23   Adults 25-60 years old: 1-3 doses may be recommended based on certain risk factors  Adults 72 years old: Prevnar (PCV13) vaccine recommended followed by Pneumovax (PPSV23) vaccine  If already received PPSV23 since turning 65, then PCV13 recommended at least one year after PPSV23 dose  Hepatitis B Vaccine 3 dose series if at intermediate or high risk (ex: diabetes, end stage renal disease, liver disease)   Tetanus (Td) Vaccine - COST NOT COVERED BY MEDICARE PART B Following completion of primary series, a booster dose should be given every 10 years to maintain immunity against tetanus  Td may also be given as tetanus wound prophylaxis  Tdap Vaccine - COST NOT COVERED BY MEDICARE PART B Recommended at least once for all adults  For pregnant patients, recommended with each pregnancy  Shingles Vaccine (Shingrix) - COST NOT COVERED BY MEDICARE PART B  2 shot series recommended in those aged 48 and above     Health Maintenance Due:      Topic Date Due    MAMMOGRAM  02/24/2021    Colorectal Cancer Screening  10/11/2022    Hepatitis C Screening  Completed     Immunizations Due:      Topic Date Due    DTaP,Tdap,and Td Vaccines (1 - Tdap) 08/30/1972    Pneumococcal Vaccine: 65+ Years (1 of 1 - PPSV23) 08/30/2016     Advance Directives   What are advance directives? Advance directives are legal documents that state your wishes and plans for medical care  These plans are made ahead of time in case you lose your ability to make decisions for yourself   Advance directives can apply to any medical decision, such as the treatments you want, and if you want to donate organs  What are the types of advance directives? There are many types of advance directives, and each state has rules about how to use them  You may choose a combination of any of the following:  · Living will: This is a written record of the treatment you want  You can also choose which treatments you do not want, which to limit, and which to stop at a certain time  This includes surgery, medicine, IV fluid, and tube feedings  · Durable power of  for healthcare [facility]): This is a written record that states who you want to make healthcare choices for you when you are unable to make them for yourself  This person, called a proxy, is usually a family member or a friend  You may choose more than 1 proxy  · Do not resuscitate (DNR) order:  A DNR order is used in case your heart stops beating or you stop breathing  It is a request not to have certain forms of treatment, such as CPR  A DNR order may be included in other types of advance directives  · Medical directive: This covers the care that you want if you are in a coma, near death, or unable to make decisions for yourself  You can list the treatments you want for each condition  Treatment may include pain medicine, surgery, blood transfusions, dialysis, IV or tube feedings, and a ventilator (breathing machine)  · Values history: This document has questions about your views, beliefs, and how you feel and think about life  This information can help others choose the care that you would choose  Why are advance directives important? An advance directive helps you control your care  Although spoken wishes may be used, it is better to have your wishes written down  Spoken wishes can be misunderstood, or not followed  Treatments may be given even if you do not want them  An advance directive may make it easier for your family to make difficult choices about your care     Fall Prevention    Fall prevention  includes ways to make your home and other areas safer  It also includes ways you can move more carefully to prevent a fall  Health conditions that cause changes in your blood pressure, vision, or muscle strength and coordination may increase your risk for falls  Medicines may also increase your risk for falls if they make you dizzy, weak, or sleepy  Fall prevention tips:   · Stand or sit up slowly  · Use assistive devices as directed  · Wear shoes that fit well and have soles that   · Wear a personal alarm  · Stay active  · Manage your medical conditions  Home Safety Tips:  · Add items to prevent falls in the bathroom  · Keep paths clear  · Install bright lights in your home  · Keep items you use often on shelves within reach  · Paint or place reflective tape on the edges of your stairs  Weight Management   Why it is important to manage your weight:  Being overweight increases your risk of health conditions such as heart disease, high blood pressure, type 2 diabetes, and certain types of cancer  It can also increase your risk for osteoarthritis, sleep apnea, and other respiratory problems  Aim for a slow, steady weight loss  Even a small amount of weight loss can lower your risk of health problems  How to lose weight safely:  A safe and healthy way to lose weight is to eat fewer calories and get regular exercise  You can lose up about 1 pound a week by decreasing the number of calories you eat by 500 calories each day  Healthy meal plan for weight management:  A healthy meal plan includes a variety of foods, contains fewer calories, and helps you stay healthy  A healthy meal plan includes the following:  · Eat whole-grain foods more often  A healthy meal plan should contain fiber  Fiber is the part of grains, fruits, and vegetables that is not broken down by your body  Whole-grain foods are healthy and provide extra fiber in your diet   Some examples of whole-grain foods are whole-wheat breads and pastas, oatmeal, brown rice, and bulgur  · Eat a variety of vegetables every day  Include dark, leafy greens such as spinach, kale, amalia greens, and mustard greens  Eat yellow and orange vegetables such as carrots, sweet potatoes, and winter squash  · Eat a variety of fruits every day  Choose fresh or canned fruit (canned in its own juice or light syrup) instead of juice  Fruit juice has very little or no fiber  · Eat low-fat dairy foods  Drink fat-free (skim) milk or 1% milk  Eat fat-free yogurt and low-fat cottage cheese  Try low-fat cheeses such as mozzarella and other reduced-fat cheeses  · Choose meat and other protein foods that are low in fat  Choose beans or other legumes such as split peas or lentils  Choose fish, skinless poultry (chicken or turkey), or lean cuts of red meat (beef or pork)  Before you cook meat or poultry, cut off any visible fat  · Use less fat and oil  Try baking foods instead of frying them  Add less fat, such as margarine, sour cream, regular salad dressing and mayonnaise to foods  Eat fewer high-fat foods  Some examples of high-fat foods include french fries, doughnuts, ice cream, and cakes  · Eat fewer sweets  Limit foods and drinks that are high in sugar  This includes candy, cookies, regular soda, and sweetened drinks  Exercise:  Exercise at least 30 minutes per day on most days of the week  Some examples of exercise include walking, biking, dancing, and swimming  You can also fit in more physical activity by taking the stairs instead of the elevator or parking farther away from stores  Ask your healthcare provider about the best exercise plan for you  © Copyright [employer] 2018 Information is for End User's use only and may not be sold, redistributed or otherwise used for commercial purposes   All illustrations and images included in CareNotes® are the copyrighted property of A D A M , [employer]  or [address] [place] [address]

## 2021-02-19 NOTE — PROGRESS NOTES
Assessment and Plan:     Problem List Items Addressed This Visit     None      Visit Diagnoses     Encounter for Medicare annual wellness exam    -  Primary    Overweight with body mass index (BMI) of 25 to 25 9 in adult            BMI Counseling: Body mass index is 25 21 kg/m²  The BMI is above normal  Nutrition recommendations include decreasing portion sizes, encouraging healthy choices of fruits and vegetables, decreasing fast food intake, consuming healthier snacks, limiting drinks that contain sugar, moderation in carbohydrate intake and increasing intake of lean protein  Exercise recommendations include exercising 3-5 times per week  Falls Plan of Care: balance, strength, and gait training instructions were provided  Medications that increase falls were reviewed  Preventive health issues were discussed with patient, and age appropriate screening tests were ordered as noted in patient's After Visit Summary  Personalized health advice and appropriate referrals for health education or preventive services given if needed, as noted in patient's After Visit Summary  History of Present Illness:     Patient presents for Medicare Annual Wellness visit    Patient Care Team:  Curt Rank as PCP - General (Family Medicine)     Problem List:     Patient Active Problem List   Diagnosis    Malignant neoplasm of tonsil ([address] )    Overweight (BMI 25 0-29  9)    Impaired fasting glucose    Depression with anxiety    Acquired hypothyroidism    Depression, recurrent (HCC)      Past Medical and Surgical History:     Past Medical History:   Diagnosis Date    Anxiety     Cancer [facility]) ~2009    Tonsils- followed by Oncology( Dr Devan Duong, Dr Neymar Manjarrez)    Disease of thyroid gland      Past Surgical History:   Procedure Laterality Date   Devon Chapman      Family History:     Family History   Problem Relation Age of Onset    No Known Problems Mother     No Known Problems Father    Michael Peterson Breast cancer Sister 27    No Known Problems Daughter     No Known Problems Maternal Grandmother     No Known Problems Paternal Grandmother     No Known Problems Sister     No Known Problems Sister     No Known Problems Sister     No Known Problems Maternal Aunt     No Known Problems Maternal Aunt       Social History:     E-Cigarette/Vaping    E-Cigarette Use Never User      E-Cigarette/Vaping Substances    Nicotine No     THC No     CBD No     Flavoring No     Other No     Unknown No      Social History     Socioeconomic History    Marital status:      Spouse name: None    Number of children: None    Years of education: None    Highest education level: None   Occupational History    None   Social Needs    Financial resource strain: None    Food insecurity     Worry: None     Inability: None    Transportation needs     Medical: None     Non-medical: None   Tobacco Use    Smoking status: Former Smoker    Smokeless tobacco: Never Used   Substance and Sexual Activity    Alcohol use: None    Drug use: None    Sexual activity: None   Lifestyle    Physical activity     Days per week: None     Minutes per session: None    Stress: None   Relationships    Social connections     Talks on phone: None     Gets together: None     Attends [employer] service: None     Active member of club or organization: None     Attends meetings of clubs or organizations: None     Relationship status: None    Intimate partner violence     Fear of current or ex partner: None     Emotionally abused: None     Physically abused: None     Forced sexual activity: None   Other Topics Concern    None   Social History Narrative    None      Medications and Allergies:     Current Outpatient Medications   Medication Sig Dispense Refill    Ascorbic Acid (LETICIA-C PO) Take 1,000 mg by mouth daily      aspirin 325 mg tablet Take 81 mg by mouth daily       ASPIRIN 81 PO Take 81 mg by mouth daily      FLUoxetine (PROzac) 40 MG capsule TAKE 1 CAPSULE BY MOUTH EVERY DAY 30 capsule 1    ketoconazole (NIZORAL) 2 % shampoo Apply topically      levothyroxine 100 mcg tablet TAKE 1 TABLET DAILY 90 tablet 1    lisinopril (ZESTRIL) 20 mg tablet Take 1 tablet (20 mg total) by mouth daily 90 tablet 1    LORazepam (ATIVAN) 2 mg tablet TAKE 1 TABLET BY MOUTH TWICE A DAY AS NEEDED FOR ANXIETY 60 tablet 2    Melatonin 10 MG TABS Take by mouth once at bedtime      mometasone (ELOCON) 0 1 % ointment Apply topically daily      Multiple Vitamin (MULTIVITAMIN) tablet Take 1 tablet by mouth daily      MUPIROCIN EX Apply topically daily      ofloxacin (FLOXIN) 0 3 % otic solution Administer 5 drops into both ears 2 (two) times a day 10 mL 1    ofloxacin (FLOXIN) 0 3 % otic solution Administer 5 drops into both ears 2 (two) times a day 10 mL 5    TURMERIC PO Take 1,000 mg by mouth daily      vitamin B-12 (VITAMIN B-12) 500 mcg tablet Take 5,000 mcg by mouth daily      Vitamin D, Cholecalciferol, 50 MCG (2000 UT) CAPS Take by mouth daily      triamcinolone (KENALOG) 0 5 % cream Apply topically 3 (three) times a day (Patient not taking: Reported on 12/8/2020) 454 g 0     No current facility-administered medications for this visit        No Known Allergies   Immunizations:     Immunization History   Administered Date(s) Administered    Influenza Split High Dose Preservative Free IM 11/05/2019    Influenza, high dose seasonal 0 7 mL 09/06/2020    Zoster Vaccine Recombinant 09/10/2019, 12/21/2019      Health Maintenance:         Topic Date Due    MAMMOGRAM  02/24/2021    Colorectal Cancer Screening  10/11/2022    Hepatitis C Screening  Completed         Topic Date Due    DTaP,Tdap,and Td Vaccines (1 - Tdap) [birth year]/1972    Pneumococcal Vaccine: 65+ Years (1 of 1 - PPSV23) 08/30/2016      Medicare Health Risk Assessment:     /74   Pulse 80   Temp 98 5 °F (36 9 °C)   Resp 18   Ht 5' 6" (1 676 m)   Wt 70 9 kg (156 lb 3 2 oz) SpO2 98%   BMI 25 21 kg/m²      Jose Whiteside is here for her Subsequent Wellness visit  Health Risk Assessment:   Patient rates overall health as good  Patient feels that their physical health rating is same  Eyesight was rated as slightly worse  Hearing was rated as slightly worse  Patient feels that their emotional and mental health rating is same  Pain experienced in the last 7 days has been some  Patient's pain rating has been 1/10  Patient states that she has experienced no weight loss or gain in last 6 months  Depression Screening:   PHQ-2 Score: 1  PHQ-9 Score: 2      Fall Risk Screening: In the past year, patient has experienced: history of falling in past year    Number of falls: 1  Injured during fall?: Yes    Feels unsteady when standing or walking?: No    Worried about falling?: Yes      Urinary Incontinence Screening:   Patient has not leaked urine accidently in the last six months  Home Safety:  Patient does not have trouble with stairs inside or outside of their home  Patient has working smoke alarms and has working carbon monoxide detector  Home safety hazards include: none  Nutrition:   Current diet is Regular and Limited junk food  Medications:   Patient is currently taking over-the-counter supplements  OTC medications include: see medication list  Patient is able to manage medications  Activities of Daily Living (ADLs)/Instrumental Activities of Daily Living (IADLs):   Walk and transfer into and out of bed and chair?: Yes  Dress and groom yourself?: Yes    Bathe or shower yourself?: Yes    Feed yourself?  Yes  Do your laundry/housekeeping?: Yes  Manage your money, pay your bills and track your expenses?: Yes  Make your own meals?: Yes    Do your own shopping?: Yes    PREVENTIVE SCREENINGS      Cardiovascular Screening:    General: Screening Current      Diabetes Screening:     General: Screening Current      Colorectal Cancer Screening:     General: Screening Current      Breast Cancer Screening:     General: Screening Current      Cervical Cancer Screening:    General: Screening Not Indicated      Lung Cancer Screening:     General: Screening Not Indicated      Hepatitis C Screening:    General: Screening Current      MARY Snyder

## 2021-02-19 NOTE — PROGRESS NOTES
[facility]        NAME: Isma Camargo is a 72 y o  female  : 1950    MRN: [id number]  DATE: 2021  TIME: 10:40 AM    Assessment and Plan   Anxiety [F41 9]  1  Anxiety  busPIRone (BUSPAR) 5 mg tablet   2  Encounter for Medicare annual wellness exam     3  Overweight with body mass index (BMI) of 25 to 25 9 in adult     4  Need for vaccination  PNEUMOCOCCAL CONJUGATE VACCINE 13-VALENT GREATER THAN 6 MONTHS   5  Acquired hypothyroidism     6  Depression, recurrent [facility])           Patient Instructions     Patient Instructions     Start with 5mg twice daily, can increase if needed (MAX dose per day is 45mg), medcheck in 3 months    Medicare Preventive Visit Patient Instructions  Thank you for completing your Welcome to Medicare Visit or Medicare Annual Wellness Visit today  Your next wellness visit will be due in one year (2022)  The screening/preventive services that you may require over the next 5-10 years are detailed below  Some tests may not apply to you based off risk factors and/or age  Screening tests ordered at today's visit but not completed yet may show as past due  Also, please note that scanned in results may not display below  Preventive Screenings:  Service Recommendations Previous Testing/Comments   Colorectal Cancer Screening  * Colonoscopy    * Fecal Occult Blood Test (FOBT)/Fecal Immunochemical Test (FIT)  * Fecal DNA/Cologuard Test  * Flexible Sigmoidoscopy Age: 54-65 years old   Colonoscopy: every 10 years (may be performed more frequently if at higher risk)  OR  FOBT/FIT: every 1 year  OR  Cologuard: every 3 years  OR  Sigmoidoscopy: every 5 years  Screening may be recommended earlier than age 48 if at higher risk for colorectal cancer  Also, an individualized decision between you and your healthcare provider will decide whether screening between the ages of 74-80 would be appropriate   Colonoscopy: 10/11/2012  FOBT/FIT: Not on file  Cologuard: Not on file  Sigmoidoscopy: Not on file    Screening Current     Breast Cancer Screening Age: 36 years old  Frequency: every 1-2 years  Not required if history of left and right mastectomy Mammogram: 02/24/2020    Screening Current   Cervical Cancer Screening Between the ages of 21-29, pap smear recommended once every 3 years  Between the ages of 33-67, can perform pap smear with HPV co-testing every 5 years  Recommendations may differ for women with a history of total hysterectomy, cervical cancer, or abnormal pap smears in past  Pap Smear: Not on file    Screening Not Indicated   Hepatitis C Screening Once for adults born between 1945 and 1965  More frequently in patients at high risk for Hepatitis C Hep C Antibody: 02/24/2020    Screening Current   Diabetes Screening 1-2 times per year if you're at risk for diabetes or have pre-diabetes Fasting glucose: 92 mg/dL   A1C: 5 2 %    Screening Current   Cholesterol Screening Once every 5 years if you don't have a lipid disorder  May order more often based on risk factors  Lipid panel: 02/17/2021    Screening Current     Other Preventive Screenings Covered by Medicare:  1  Abdominal Aortic Aneurysm (AAA) Screening: covered once if your at risk  You're considered to be at risk if you have a family history of AAA  2  Lung Cancer Screening: covers low dose CT scan once per year if you meet all of the following conditions: (1) Age 50-69; (2) No signs or symptoms of lung cancer; (3) Current smoker or have quit smoking within the last 15 years; (4) You have a tobacco smoking history of at least 30 pack years (packs per day multiplied by number of years you smoked); (5) You get a written order from a healthcare provider  3  Glaucoma Screening: covered annually if you're considered high risk: (1) You have diabetes OR (2) Family history of glaucoma OR (3)  aged 48 and older OR (3)  American aged 72 and older  3   Osteoporosis Screening: covered every 2 years if you meet one of the following conditions: (1) You're estrogen deficient and at risk for osteoporosis based off medical history and other findings; (2) Have a vertebral abnormality; (3) On glucocorticoid therapy for more than 3 months; (4) Have primary hyperparathyroidism; (5) On osteoporosis medications and need to assess response to drug therapy  · Last bone density test (DXA Scan): Not on file  5  HIV Screening: covered annually if you're between the age of 12-76  Also covered annually if you are younger than 13 and older than 72 with risk factors for HIV infection  For pregnant patients, it is covered up to 3 times per pregnancy  Immunizations:  Immunization Recommendations   Influenza Vaccine Annual influenza vaccination during flu season is recommended for all persons aged >= 6 months who do not have contraindications   Pneumococcal Vaccine (Prevnar and Pneumovax)  * Prevnar = PCV13  * Pneumovax = PPSV23   Adults 25-60 years old: 1-3 doses may be recommended based on certain risk factors  Adults 72 years old: Prevnar (PCV13) vaccine recommended followed by Pneumovax (PPSV23) vaccine  If already received PPSV23 since turning 65, then PCV13 recommended at least one year after PPSV23 dose  Hepatitis B Vaccine 3 dose series if at intermediate or high risk (ex: diabetes, end stage renal disease, liver disease)   Tetanus (Td) Vaccine - COST NOT COVERED BY MEDICARE PART B Following completion of primary series, a booster dose should be given every 10 years to maintain immunity against tetanus  Td may also be given as tetanus wound prophylaxis  Tdap Vaccine - COST NOT COVERED BY MEDICARE PART B Recommended at least once for all adults  For pregnant patients, recommended with each pregnancy     Shingles Vaccine (Shingrix) - COST NOT COVERED BY MEDICARE PART B  2 shot series recommended in those aged 48 and above     Health Maintenance Due:      Topic Date Due    MAMMOGRAM  02/24/2021   [facility] Cancer Screening  10/11/2022    Hepatitis C Screening  Completed     Immunizations Due:      Topic Date Due    DTaP,Tdap,and Td Vaccines (1 - Tdap) 08/30/1972    Pneumococcal Vaccine: 65+ Years (1 of 1 - PPSV23) 08/30/2016     Advance Directives   What are advance directives? Advance directives are legal documents that state your wishes and plans for medical care  These plans are made ahead of time in case you lose your ability to make decisions for yourself  Advance directives can apply to any medical decision, such as the treatments you want, and if you want to donate organs  What are the types of advance directives? There are many types of advance directives, and each state has rules about how to use them  You may choose a combination of any of the following:  · Living will: This is a written record of the treatment you want  You can also choose which treatments you do not want, which to limit, and which to stop at a certain time  This includes surgery, medicine, IV fluid, and tube feedings  · Durable power of  for healthcare [facility]): This is a written record that states who you want to make healthcare choices for you when you are unable to make them for yourself  This person, called a proxy, is usually a family member or a friend  You may choose more than 1 proxy  · Do not resuscitate (DNR) order:  A DNR order is used in case your heart stops beating or you stop breathing  It is a request not to have certain forms of treatment, such as CPR  A DNR order may be included in other types of advance directives  · Medical directive: This covers the care that you want if you are in a coma, near death, or unable to make decisions for yourself  You can list the treatments you want for each condition  Treatment may include pain medicine, surgery, blood transfusions, dialysis, IV or tube feedings, and a ventilator (breathing machine)  · Values history:   This document has questions about your views, beliefs, and how you feel and think about life  This information can help others choose the care that you would choose  Why are advance directives important? An advance directive helps you control your care  Although spoken wishes may be used, it is better to have your wishes written down  Spoken wishes can be misunderstood, or not followed  Treatments may be given even if you do not want them  An advance directive may make it easier for your family to make difficult choices about your care  Fall Prevention    Fall prevention  includes ways to make your home and other areas safer  It also includes ways you can move more carefully to prevent a fall  Health conditions that cause changes in your blood pressure, vision, or muscle strength and coordination may increase your risk for falls  Medicines may also increase your risk for falls if they make you dizzy, weak, or sleepy  Fall prevention tips:   · Stand or sit up slowly  · Use assistive devices as directed  · Wear shoes that fit well and have soles that   · Wear a personal alarm  · Stay active  · Manage your medical conditions  Home Safety Tips:  · Add items to prevent falls in the bathroom  · Keep paths clear  · Install bright lights in your home  · Keep items you use often on shelves within reach  · Paint or place reflective tape on the edges of your stairs  Weight Management   Why it is important to manage your weight:  Being overweight increases your risk of health conditions such as heart disease, high blood pressure, type 2 diabetes, and certain types of cancer  It can also increase your risk for osteoarthritis, sleep apnea, and other respiratory problems  Aim for a slow, steady weight loss  Even a small amount of weight loss can lower your risk of health problems  How to lose weight safely:  A safe and healthy way to lose weight is to eat fewer calories and get regular exercise   You can lose up about 1 pound a week by decreasing the number of calories you eat by 500 calories each day  Healthy meal plan for weight management:  A healthy meal plan includes a variety of foods, contains fewer calories, and helps you stay healthy  A healthy meal plan includes the following:  · Eat whole-grain foods more often  A healthy meal plan should contain fiber  Fiber is the part of grains, fruits, and vegetables that is not broken down by your body  Whole-grain foods are healthy and provide extra fiber in your diet  Some examples of whole-grain foods are whole-wheat breads and pastas, oatmeal, brown rice, and bulgur  · Eat a variety of vegetables every day  Include dark, leafy greens such as spinach, kale, amalia greens, and mustard greens  Eat yellow and orange vegetables such as carrots, sweet potatoes, and winter squash  · Eat a variety of fruits every day  Choose fresh or canned fruit (canned in its own juice or light syrup) instead of juice  Fruit juice has very little or no fiber  · Eat low-fat dairy foods  Drink fat-free (skim) milk or 1% milk  Eat fat-free yogurt and low-fat cottage cheese  Try low-fat cheeses such as mozzarella and other reduced-fat cheeses  · Choose meat and other protein foods that are low in fat  Choose beans or other legumes such as split peas or lentils  Choose fish, skinless poultry (chicken or turkey), or lean cuts of red meat (beef or pork)  Before you cook meat or poultry, cut off any visible fat  · Use less fat and oil  Try baking foods instead of frying them  Add less fat, such as margarine, sour cream, regular salad dressing and mayonnaise to foods  Eat fewer high-fat foods  Some examples of high-fat foods include french fries, doughnuts, ice cream, and cakes  · Eat fewer sweets  Limit foods and drinks that are high in sugar  This includes candy, cookies, regular soda, and sweetened drinks  Exercise:  Exercise at least 30 minutes per day on most days of the week   Some examples of exercise include walking, biking, dancing, and swimming  You can also fit in more physical activity by taking the stairs instead of the elevator or parking farther away from stores  Ask your healthcare provider about the best exercise plan for you  © Copyright [employer] 2018 Information is for End User's use only and may not be sold, redistributed or otherwise used for commercial purposes  All illustrations and images included in CareNotes® are the copyrighted property of [employer]  or [address] [place] [address]            Chief Complaint     Chief Complaint   Patient presents with    Medicare Wellness Visit         History of Present Illness       Review blood work and medications    Discuss anxiety- is taking Prozac 40mg and Ativan at bedtime, continues with daytime anxiety- had discussed Buspar at last OV, is willing to try it at this time  Start with 5mg twice daily, can increase if needed, medcheck in 3 months      Review of Systems   Review of Systems   Constitutional: Negative for activity change, diaphoresis, fatigue and fever  HENT: Negative for congestion, facial swelling, hearing loss, rhinorrhea, sinus pressure, sinus pain, sneezing, sore throat and voice change  Eyes: Negative for discharge and visual disturbance  Respiratory: Negative for cough, choking, chest tightness, shortness of breath, wheezing and stridor  Cardiovascular: Negative for chest pain, palpitations and leg swelling  Gastrointestinal: Negative for abdominal distention, abdominal pain, constipation, diarrhea, nausea and vomiting  Endocrine: Negative for polydipsia, polyphagia and polyuria  Genitourinary: Negative for difficulty urinating, dysuria, frequency and urgency  Musculoskeletal: Negative for arthralgias, back pain, gait problem, joint swelling, myalgias, neck pain and neck stiffness  Skin: Negative for color change, rash and wound     Neurological: Negative for dizziness, syncope, speech difficulty, weakness, light-headedness and headaches  Hematological: Negative for adenopathy  Does not bruise/bleed easily  Psychiatric/Behavioral: Negative for agitation, behavioral problems, confusion, hallucinations, sleep disturbance and suicidal ideas  The patient is nervous/anxious            Current Medications       Current Outpatient Medications:     Ascorbic Acid (LETICIA-C PO), Take 1,000 mg by mouth daily, Disp: , Rfl:     aspirin 325 mg tablet, Take 81 mg by mouth daily , Disp: , Rfl:     ASPIRIN 81 PO, Take 81 mg by mouth daily, Disp: , Rfl:     FLUoxetine (PROzac) 40 MG capsule, TAKE 1 CAPSULE BY MOUTH EVERY DAY, Disp: 30 capsule, Rfl: 1    ketoconazole (NIZORAL) 2 % shampoo, Apply topically, Disp: , Rfl:     levothyroxine 100 mcg tablet, TAKE 1 TABLET DAILY, Disp: 90 tablet, Rfl: 1    lisinopril (ZESTRIL) 20 mg tablet, Take 1 tablet (20 mg total) by mouth daily, Disp: 90 tablet, Rfl: 1    LORazepam (ATIVAN) 2 mg tablet, TAKE 1 TABLET BY MOUTH TWICE A DAY AS NEEDED FOR ANXIETY, Disp: 60 tablet, Rfl: 2    Melatonin 10 MG TABS, Take by mouth once at bedtime, Disp: , Rfl:     mometasone (ELOCON) 0 1 % ointment, Apply topically daily, Disp: , Rfl:     Multiple Vitamin (MULTIVITAMIN) tablet, Take 1 tablet by mouth daily, Disp: , Rfl:     MUPIROCIN EX, Apply topically daily, Disp: , Rfl:     ofloxacin (FLOXIN) 0 3 % otic solution, Administer 5 drops into both ears 2 (two) times a day, Disp: 10 mL, Rfl: 1    ofloxacin (FLOXIN) 0 3 % otic solution, Administer 5 drops into both ears 2 (two) times a day, Disp: 10 mL, Rfl: 5    TURMERIC PO, Take 1,000 mg by mouth daily, Disp: , Rfl:     vitamin B-12 (VITAMIN B-12) 500 mcg tablet, Take 5,000 mcg by mouth daily, Disp: , Rfl:     Vitamin D, Cholecalciferol, 50 MCG (2000 UT) CAPS, Take by mouth daily, Disp: , Rfl:     busPIRone (BUSPAR) 5 mg tablet, Take 1 tablet (5 mg total) by mouth 2 (two) times a day, Disp: 180 tablet, Rfl: 1    triamcinolone (KENALOG) 0 5 % cream, Apply topically 3 (three) times a day (Patient not taking: Reported on 12/8/2020), Disp: 454 g, Rfl: 0    Current Allergies     Allergies as of 02/19/2021    (No Known Allergies)            The following portions of the patient's history were reviewed and updated as appropriate: allergies, current medications, past family history, past medical history, past social history, past surgical history and problem list      Past Medical History:   Diagnosis Date    Anxiety     Cancer ([facility] [address] ) ~2009    Tonsils- followed by Oncology( Dr Aurora Dao, Dr Meredith Fernando)    Disease of thyroid gland        Past Surgical History:   Procedure Laterality Date    TONSILLECTOMY      Dr Iban Avila       Family History   Problem Relation Age of Onset    No Known Problems Mother     No Known Problems Father     Breast cancer Sister 27    No Known Problems Daughter     No Known Problems Maternal Grandmother     No Known Problems Paternal Grandmother     No Known Problems Sister     No Known Problems Sister     No Known Problems Sister     No Known Problems Maternal Aunt     No Known Problems Maternal Aunt          Medications have been verified  Objective   /74   Pulse 80   Temp 98 5 °F (36 9 °C)   Resp 18   Ht 5' 6" (1 676 m)   Wt 70 9 kg (156 lb 3 2 oz)   SpO2 98%   BMI 25 21 kg/m²        Physical Exam     Physical Exam  Vitals signs and nursing note reviewed  Constitutional:       General: She is not in acute distress  Appearance: She is well-developed  She is not diaphoretic  Neck:      Musculoskeletal: Normal range of motion and neck supple  Thyroid: No thyromegaly  Trachea: No tracheal deviation  Cardiovascular:      Rate and Rhythm: Normal rate and regular rhythm  Heart sounds: Normal heart sounds  No murmur  Pulmonary:      Effort: Pulmonary effort is normal  No respiratory distress  Breath sounds: Normal breath sounds  No wheezing     Musculoskeletal: Normal range of motion  General: No tenderness or deformity  Skin:     General: Skin is warm and dry  Findings: No erythema or rash  Neurological:      Mental Status: She is alert and oriented to person, place, and time  Psychiatric:         Attention and Perception: Attention normal          Mood and Affect: Affect normal  Mood is anxious  Behavior: Behavior normal  Behavior is cooperative  Thought Content:  Thought content normal          Cognition and Memory: Cognition and memory normal          Judgment: Judgment normal

## 2021-02-21 DIAGNOSIS — F33.1 MODERATE EPISODE OF RECURRENT MAJOR DEPRESSIVE DISORDER (HCC): ICD-10-CM

## 2021-02-21 RX ORDER — FLUOXETINE HYDROCHLORIDE 40 MG/1
CAPSULE ORAL
Qty: 90 CAPSULE | Refills: 1 | Status: SHIPPED | OUTPATIENT
Start: 2021-02-21 | End: 2021-08-23

## 2021-03-12 DIAGNOSIS — F41.8 DEPRESSION WITH ANXIETY: ICD-10-CM

## 2021-03-12 NOTE — TELEPHONE ENCOUNTER
Patient requesting refill(s) of: lorazepam 2 mg take 1 tab PO BID PRN    Last filled: 12/10/20  Last appt:2/19/21  Next appt:5/20/21  Pharmacy: [facility]    Pt states she has about 20 pills left, and approval can wait until PCP is back in office on Monday

## 2021-03-14 RX ORDER — LORAZEPAM 2 MG/1
2 TABLET ORAL 2 TIMES DAILY PRN
Qty: 60 TABLET | Refills: 2 | Status: SHIPPED | OUTPATIENT
Start: 2021-03-14 | End: 2021-07-06 | Stop reason: SDUPTHER

## 2021-03-22 ENCOUNTER — IMMUNIZATIONS (OUTPATIENT)
Dept: FAMILY MEDICINE CLINIC | Facility: HOSPITAL | Age: 71
End: 2021-03-22

## 2021-03-22 DIAGNOSIS — Z23 ENCOUNTER FOR IMMUNIZATION: Primary | ICD-10-CM

## 2021-03-22 PROCEDURE — 91300 SARS-COV-2 / COVID-19 MRNA VACCINE (PFIZER-BIONTECH) 30 MCG: CPT | Performed by: FAMILY MEDICINE

## 2021-03-22 PROCEDURE — 0001A SARS-COV-2 / COVID-19 MRNA VACCINE (PFIZER-BIONTECH) 30 MCG: CPT | Performed by: FAMILY MEDICINE

## 2021-04-12 ENCOUNTER — IMMUNIZATIONS (OUTPATIENT)
Dept: FAMILY MEDICINE CLINIC | Facility: HOSPITAL | Age: 71
End: 2021-04-12

## 2021-04-12 DIAGNOSIS — Z23 ENCOUNTER FOR IMMUNIZATION: Primary | ICD-10-CM

## 2021-04-12 PROCEDURE — 0002A SARS-COV-2 / COVID-19 MRNA VACCINE (PFIZER-BIONTECH) 30 MCG: CPT

## 2021-04-12 PROCEDURE — 91300 SARS-COV-2 / COVID-19 MRNA VACCINE (PFIZER-BIONTECH) 30 MCG: CPT

## 2021-05-20 ENCOUNTER — OFFICE VISIT (OUTPATIENT)
Dept: FAMILY MEDICINE CLINIC | Facility: CLINIC | Age: 71
End: 2021-05-20
Payer: COMMERCIAL

## 2021-05-20 VITALS
TEMPERATURE: 98.7 F | HEART RATE: 77 BPM | RESPIRATION RATE: 20 BRPM | BODY MASS INDEX: 25.52 KG/M2 | OXYGEN SATURATION: 97 % | DIASTOLIC BLOOD PRESSURE: 72 MMHG | WEIGHT: 158.8 LBS | HEIGHT: 66 IN | SYSTOLIC BLOOD PRESSURE: 126 MMHG

## 2021-05-20 DIAGNOSIS — E55.9 VITAMIN D DEFICIENCY: ICD-10-CM

## 2021-05-20 DIAGNOSIS — Z13.220 SCREENING CHOLESTEROL LEVEL: ICD-10-CM

## 2021-05-20 DIAGNOSIS — J30.2 SEASONAL ALLERGIES: Primary | ICD-10-CM

## 2021-05-20 DIAGNOSIS — H65.93 MIDDLE EAR EFFUSION, BILATERAL: ICD-10-CM

## 2021-05-20 DIAGNOSIS — E03.9 ACQUIRED HYPOTHYROIDISM: ICD-10-CM

## 2021-05-20 DIAGNOSIS — I10 HYPERTENSION, UNSPECIFIED TYPE: ICD-10-CM

## 2021-05-20 PROCEDURE — 1160F RVW MEDS BY RX/DR IN RCRD: CPT | Performed by: NURSE PRACTITIONER

## 2021-05-20 PROCEDURE — 3074F SYST BP LT 130 MM HG: CPT | Performed by: NURSE PRACTITIONER

## 2021-05-20 PROCEDURE — 99214 OFFICE O/P EST MOD 30 MIN: CPT | Performed by: NURSE PRACTITIONER

## 2021-05-20 PROCEDURE — 3078F DIAST BP <80 MM HG: CPT | Performed by: NURSE PRACTITIONER

## 2021-05-20 PROCEDURE — 1036F TOBACCO NON-USER: CPT | Performed by: NURSE PRACTITIONER

## 2021-05-20 PROCEDURE — 3008F BODY MASS INDEX DOCD: CPT | Performed by: NURSE PRACTITIONER

## 2021-05-20 PROCEDURE — 3725F SCREEN DEPRESSION PERFORMED: CPT | Performed by: NURSE PRACTITIONER

## 2021-05-20 RX ORDER — LEVOCETIRIZINE DIHYDROCHLORIDE 5 MG/1
5 TABLET, FILM COATED ORAL EVERY EVENING
Qty: 90 TABLET | Refills: 1 | Status: SHIPPED | OUTPATIENT
Start: 2021-05-20 | End: 2021-08-30 | Stop reason: HOSPADM

## 2021-05-20 RX ORDER — LEVOTHYROXINE SODIUM 0.1 MG/1
100 TABLET ORAL DAILY
Qty: 90 TABLET | Refills: 1 | Status: SHIPPED | OUTPATIENT
Start: 2021-05-20 | End: 2022-02-28 | Stop reason: SDUPTHER

## 2021-05-20 RX ORDER — PREDNISONE 10 MG/1
10 TABLET ORAL 2 TIMES DAILY WITH MEALS
Qty: 10 TABLET | Refills: 0 | Status: SHIPPED | OUTPATIENT
Start: 2021-05-20 | End: 2021-08-30 | Stop reason: HOSPADM

## 2021-05-20 NOTE — PATIENT INSTRUCTIONS
Start Xyzal daily for allergy symptoms  Prednisone as directed for 5 days to decrease fluid in ears/sinuses  Continue same medications  Return in 6 months for routine medication check, call sooner if any problems/concerns  Get bloodwork done before next appointment in 6 months

## 2021-05-20 NOTE — PROGRESS NOTES
[facility]        NAME: Orlando Hamilton is a 72 y o  female  : 1950    MRN: [id number]  DATE: May 20, 2021  TIME: 9:49 AM    Assessment and Plan   Seasonal allergies [J30 2]  1  Seasonal allergies  levocetirizine (XYZAL) 5 MG tablet   2  Acquired hypothyroidism  levothyroxine 100 mcg tablet    TSH, 3rd generation with Free T4 reflex   3  Hypertension, unspecified type  Comprehensive metabolic panel   4  Vitamin D deficiency  Vitamin D 25 hydroxy   5  Screening cholesterol level  Lipid panel   6  Middle ear effusion, bilateral  predniSONE 10 mg tablet         Patient Instructions     Patient Instructions   Start Xyzal daily for allergy symptoms  Prednisone as directed for 5 days to decrease fluid in ears/sinuses  Continue same medications  Return in 6 months for routine medication check, call sooner if any problems/concerns  Get bloodwork done before next appointment in 6 months          Chief Complaint     Chief Complaint   Patient presents with    Follow-up         History of Present Illness       Here to day for medication follow up-  C/o sinus congestion and fullness of ears- Sees ENT/Dr [facility]  Is not taking allergy medication every day except ear drops  Sometimes takes Benadryl  She reports that there has been an excess of drainage and discomfort from both ears and is concerned about possible infection  She was started on Prednisone 10mg twice daily for 5 days to help with her symptoms  She continues to take Buspar 5mg twice daily for her anxiety which she reports is working well  She will get repeat labwork to check her cholesterol levels which also shows improvement from her previous results  Her thyroid levels remains within normal ranges on levothyroxine 100mg daily  Review of Systems   Review of Systems   Constitutional: Negative for chills and fever  HENT: Positive for congestion, ear discharge and hearing loss (Absent left eardrum)   Negative for ear pain and sore throat  Eyes: Negative for pain and visual disturbance  Respiratory: Negative for cough and shortness of breath  Cardiovascular: Negative for chest pain and palpitations  Gastrointestinal: Negative for abdominal pain and vomiting  Genitourinary: Negative for dysuria and hematuria  Musculoskeletal: Negative for arthralgias and back pain  Skin: Negative for color change and rash  Neurological: Negative for seizures and syncope  Psychiatric/Behavioral: The patient is nervous/anxious (doing well on medication)  All other systems reviewed and are negative          Current Medications       Current Outpatient Medications:     Ascorbic Acid (LETICIA-C PO), Take 1,000 mg by mouth daily, Disp: , Rfl:     aspirin 325 mg tablet, Take 81 mg by mouth daily , Disp: , Rfl:     ASPIRIN 81 PO, Take 81 mg by mouth daily, Disp: , Rfl:     busPIRone (BUSPAR) 5 mg tablet, Take 1 tablet (5 mg total) by mouth 2 (two) times a day, Disp: 180 tablet, Rfl: 1    FLUoxetine (PROzac) 40 MG capsule, TAKE 1 CAPSULE BY MOUTH EVERY DAY, Disp: 90 capsule, Rfl: 1    levothyroxine 100 mcg tablet, Take 1 tablet (100 mcg total) by mouth daily, Disp: 90 tablet, Rfl: 1    lisinopril (ZESTRIL) 20 mg tablet, Take 1 tablet (20 mg total) by mouth daily, Disp: 90 tablet, Rfl: 1    LORazepam (ATIVAN) 2 mg tablet, Take 1 tablet (2 mg total) by mouth 2 (two) times a day as needed for anxiety, Disp: 60 tablet, Rfl: 2    Melatonin 10 MG TABS, Take by mouth once at bedtime, Disp: , Rfl:     Multiple Vitamin (MULTIVITAMIN) tablet, Take 1 tablet by mouth daily, Disp: , Rfl:     ofloxacin (FLOXIN) 0 3 % otic solution, Administer 5 drops into both ears 2 (two) times a day, Disp: 10 mL, Rfl: 1    ofloxacin (FLOXIN) 0 3 % otic solution, Administer 5 drops into both ears 2 (two) times a day, Disp: 10 mL, Rfl: 5    TURMERIC PO, Take 1,000 mg by mouth daily, Disp: , Rfl:     vitamin B-12 (VITAMIN B-12) 500 mcg tablet, Take 5,000 mcg by mouth daily, Disp: , Rfl:     Vitamin D, Cholecalciferol, 50 MCG (2000 UT) CAPS, Take by mouth daily, Disp: , Rfl:     ketoconazole (NIZORAL) 2 % shampoo, Apply topically, Disp: , Rfl:     levocetirizine (XYZAL) 5 MG tablet, Take 1 tablet (5 mg total) by mouth every evening, Disp: 90 tablet, Rfl: 1    mometasone (ELOCON) 0 1 % ointment, Apply topically daily, Disp: , Rfl:     MUPIROCIN EX, Apply topically daily, Disp: , Rfl:     predniSONE 10 mg tablet, Take 1 tablet (10 mg total) by mouth 2 (two) times a day with meals, Disp: 10 tablet, Rfl: 0    Current Allergies     Allergies as of 05/20/2021    (No Known Allergies)            The following portions of the patient's history were reviewed and updated as appropriate: allergies, current medications, past family history, past medical history, past social history, past surgical history and problem list      Past Medical History:   Diagnosis Date    Anxiety     Cancer ([facility] [address] ) ~2009    Tonsils- followed by Oncology( Dr Panchito Spaulding, Dr Ly Jacobson)    Disease of thyroid gland        Past Surgical History:   Procedure Laterality Date    TONSILLECTOMY      Dr Deidre Marrero       Family History   Problem Relation Age of Onset    No Known Problems Mother     No Known Problems Father     Breast cancer Sister 27    No Known Problems Daughter     No Known Problems Maternal Grandmother     No Known Problems Paternal Grandmother     No Known Problems Sister     No Known Problems Sister     No Known Problems Sister     No Known Problems Maternal Aunt     No Known Problems Maternal Aunt          Medications have been verified  Objective   /72   Pulse 77   Temp 98 7 °F (37 1 °C) (Tympanic)   Resp 20   Ht 5' 6" (1 676 m)   Wt 72 kg (158 lb 12 8 oz)   SpO2 97%   BMI 25 63 kg/m²        Physical Exam     Physical Exam  Vitals signs and nursing note reviewed  Constitutional:       General: She is not in acute distress  Appearance: Normal appearance   She is well-developed  She is not diaphoretic  HENT:      Head: Normocephalic and atraumatic  Right Ear: Ear canal and external ear normal  Drainage and tenderness present  A middle ear effusion is present  Tympanic membrane is not erythematous  Left Ear: Hearing, ear canal and external ear normal  Tenderness present  A middle ear effusion is present  Tympanic membrane is perforated (chronicly "missing" eardrum)  Tympanic membrane is not erythematous  Ears:      Comments: Absent left eardrum     Nose: Nose normal    Eyes:      General: Lids are normal          Right eye: No discharge  Left eye: No discharge  Extraocular Movements: Extraocular movements intact  Neck:      Musculoskeletal: Normal range of motion  Cardiovascular:      Rate and Rhythm: Normal rate and regular rhythm  Heart sounds: Normal heart sounds  No murmur  No friction rub  No gallop  Pulmonary:      Effort: Pulmonary effort is normal  No respiratory distress  Breath sounds: Normal breath sounds  No wheezing or rales  Musculoskeletal: Normal range of motion  General: No tenderness or deformity  Skin:     General: Skin is warm and dry  Findings: No erythema or rash  Neurological:      General: No focal deficit present  Mental Status: She is alert and oriented to person, place, and time  Cranial Nerves: No cranial nerve deficit  Coordination: Coordination normal    Psychiatric:         Attention and Perception: Attention normal          Mood and Affect: Mood normal          Speech: Speech normal          Behavior: Behavior normal  Behavior is cooperative  Thought Content:  Thought content normal          Cognition and Memory: Cognition normal          Judgment: Judgment normal

## 2021-06-22 ENCOUNTER — OFFICE VISIT (OUTPATIENT)
Dept: FAMILY MEDICINE CLINIC | Facility: CLINIC | Age: 71
End: 2021-06-22
Payer: COMMERCIAL

## 2021-06-22 VITALS
WEIGHT: 158.4 LBS | BODY MASS INDEX: 25.46 KG/M2 | DIASTOLIC BLOOD PRESSURE: 82 MMHG | HEART RATE: 71 BPM | SYSTOLIC BLOOD PRESSURE: 126 MMHG | OXYGEN SATURATION: 96 % | TEMPERATURE: 98.5 F | HEIGHT: 66 IN | RESPIRATION RATE: 20 BRPM

## 2021-06-22 DIAGNOSIS — H60.22 ACUTE MALIGNANT OTITIS EXTERNA OF LEFT EAR: Primary | ICD-10-CM

## 2021-06-22 PROCEDURE — 1160F RVW MEDS BY RX/DR IN RCRD: CPT | Performed by: INTERNAL MEDICINE

## 2021-06-22 PROCEDURE — 1036F TOBACCO NON-USER: CPT | Performed by: INTERNAL MEDICINE

## 2021-06-22 PROCEDURE — 3725F SCREEN DEPRESSION PERFORMED: CPT | Performed by: INTERNAL MEDICINE

## 2021-06-22 PROCEDURE — 99213 OFFICE O/P EST LOW 20 MIN: CPT | Performed by: INTERNAL MEDICINE

## 2021-06-22 PROCEDURE — 3008F BODY MASS INDEX DOCD: CPT | Performed by: INTERNAL MEDICINE

## 2021-06-22 RX ORDER — CIPROFLOXACIN 500 MG/1
500 TABLET, FILM COATED ORAL EVERY 12 HOURS SCHEDULED
Qty: 14 TABLET | Refills: 0 | Status: SHIPPED | OUTPATIENT
Start: 2021-06-22 | End: 2021-06-29

## 2021-06-22 NOTE — PROGRESS NOTES
[facility]        NAME: Zane Baez is a 72 y o  female  : 1950    MRN: [id number]  DATE: 2021  TIME: 11:42 AM    Assessment and Plan   1  Acute malignant otitis externa of left ear  -perforated left TM with purulent drainage as well as mastoid tenderness, will prescribe oral antimicrobial therapy  -   ciprofloxacin (CIPRO) 500 mg tablet; Take 1 tablet (500 mg total) by mouth every 12 (twelve) hours for 7 days             Chief Complaint     Chief Complaint   Patient presents with    Neck Pain     patient c/o neck pain at the back of her neck on the left side  started 2-3 days ago  when she went outside she also had pain in her eyeball  she states the neck does feel better but she would like to have it checked  History of Present Illness       Here for acute visit, left-sided neck pain started 3 days ago  Located behind left ear, also had swelling and tenderness of the area  Reports history of seasonal allergies, post-nasal drip and nasal congestion that is relieved with benadryl  Aleve helped with neck pain  History of SCC of right tonsil, dysphagia after surgery for that but this hasn't worsened  No sore throat, cough or shortness of breath  Review of Systems   Review of Systems   Constitutional: Negative for appetite change, chills, fatigue and fever  HENT: Positive for congestion, postnasal drip, rhinorrhea and trouble swallowing  Negative for ear pain, hearing loss and sore throat  Respiratory: Negative for cough and shortness of breath  Cardiovascular: Negative for chest pain  Musculoskeletal: Positive for neck pain and neck stiffness  Neurological: Negative for dizziness           Current Medications       Current Outpatient Medications:     ASPIRIN 81 PO, Take 81 mg by mouth daily, Disp: , Rfl:     busPIRone (BUSPAR) 5 mg tablet, Take 1 tablet (5 mg total) by mouth 2 (two) times a day, Disp: 180 tablet, Rfl: 1    FLUoxetine (PROzac) 40 MG capsule, TAKE 1 CAPSULE BY MOUTH EVERY DAY, Disp: 90 capsule, Rfl: 1    levocetirizine (XYZAL) 5 MG tablet, Take 1 tablet (5 mg total) by mouth every evening, Disp: 90 tablet, Rfl: 1    levothyroxine 100 mcg tablet, Take 1 tablet (100 mcg total) by mouth daily, Disp: 90 tablet, Rfl: 1    lisinopril (ZESTRIL) 20 mg tablet, Take 1 tablet (20 mg total) by mouth daily, Disp: 90 tablet, Rfl: 1    LORazepam (ATIVAN) 2 mg tablet, Take 1 tablet (2 mg total) by mouth 2 (two) times a day as needed for anxiety, Disp: 60 tablet, Rfl: 2    Melatonin 10 MG TABS, Take by mouth once at bedtime, Disp: , Rfl:     Multiple Vitamin (MULTIVITAMIN) tablet, Take 1 tablet by mouth daily, Disp: , Rfl:     ofloxacin (FLOXIN) 0 3 % otic solution, Administer 5 drops into both ears 2 (two) times a day, Disp: 10 mL, Rfl: 1    ofloxacin (FLOXIN) 0 3 % otic solution, Administer 5 drops into both ears 2 (two) times a day, Disp: 10 mL, Rfl: 5    TURMERIC PO, Take 1,000 mg by mouth daily, Disp: , Rfl:     vitamin B-12 (VITAMIN B-12) 500 mcg tablet, Take 5,000 mcg by mouth daily, Disp: , Rfl:     Vitamin D, Cholecalciferol, 50 MCG (2000 UT) CAPS, Take by mouth daily, Disp: , Rfl:     Ascorbic Acid (LETICIA-C PO), Take 1,000 mg by mouth daily, Disp: , Rfl:     aspirin 325 mg tablet, Take 81 mg by mouth daily  (Patient not taking: Reported on 6/22/2021), Disp: , Rfl:     ciprofloxacin (CIPRO) 500 mg tablet, Take 1 tablet (500 mg total) by mouth every 12 (twelve) hours for 7 days, Disp: 14 tablet, Rfl: 0    ketoconazole (NIZORAL) 2 % shampoo, Apply topically (Patient not taking: Reported on 6/22/2021), Disp: , Rfl:     mometasone (ELOCON) 0 1 % ointment, Apply topically daily (Patient not taking: Reported on 6/22/2021), Disp: , Rfl:     MUPIROCIN EX, Apply topically daily (Patient not taking: Reported on 6/22/2021), Disp: , Rfl:     predniSONE 10 mg tablet, Take 1 tablet (10 mg total) by mouth 2 (two) times a day with meals (Patient not taking: Reported on 6/22/2021), Disp: 10 tablet, Rfl: 0    Current Allergies     Allergies as of 06/22/2021    (No Known Allergies)            The following portions of the patient's history were reviewed and updated as appropriate: allergies, current medications, past family history, past medical history, past social history, past surgical history and problem list      Past Medical History:   Diagnosis Date    Anxiety     Cancer ([address] ) ~2009    Tonsils- followed by Oncology( Dr Karol Nelson, Dr Jean Claude Vera)    Disease of thyroid gland        Past Surgical History:   Procedure Laterality Date    TONSILLECTOMY      Dr Angel Montes De Oca       Family History   Problem Relation Age of Onset    No Known Problems Mother     No Known Problems Father     Breast cancer Sister 27    No Known Problems Daughter     No Known Problems Maternal Grandmother     No Known Problems Paternal Grandmother     No Known Problems Sister     No Known Problems Sister     No Known Problems Sister     No Known Problems Maternal Aunt     No Known Problems Maternal Aunt          Medications have been verified  Objective   /82   Pulse 71   Temp 98 5 °F (36 9 °C)   Resp 20   Ht 5' 6" (1 676 m)   Wt 71 8 kg (158 lb 6 4 oz)   SpO2 96%   BMI 25 57 kg/m²        Physical Exam     Physical Exam  Vitals reviewed  Constitutional:       General: She is not in acute distress  Appearance: She is normal weight  HENT:      Head: Normocephalic and atraumatic  Right Ear: Tympanic membrane, ear canal and external ear normal       Left Ear: Drainage present  Tympanic membrane is perforated  Ears:      Comments: Left mastoid tenderness and post-auricular tenderness  Cardiovascular:      Rate and Rhythm: Normal rate and regular rhythm  Heart sounds: S1 normal and S2 normal  No murmur heard  No friction rub  No gallop  Pulmonary:      Effort: Pulmonary effort is normal  No accessory muscle usage        Breath sounds: Normal breath sounds  No wheezing, rhonchi or rales  Musculoskeletal:      Cervical back: Pain with movement and muscular tenderness present  Lymphadenopathy:      Cervical: No cervical adenopathy  Neurological:      Mental Status: She is alert               Results:  Lab Results   Component Value Date    SODIUM 144 02/17/2021    K 4 4 02/17/2021     (H) 02/17/2021    CO2 30 02/17/2021    BUN 15 02/17/2021    CREATININE 0 85 02/17/2021    GLUC 96 10/05/2018    CALCIUM 9 4 02/17/2021       Lab Results   Component Value Date    HGBA1C 5 2 02/24/2020       Lab Results   Component Value Date    WBC 4 60 12/07/2020    HGB 13 1 12/07/2020    HCT 40 2 12/07/2020    MCV 96 12/07/2020     12/07/2020

## 2021-06-22 NOTE — PATIENT INSTRUCTIONS
Please start Ciprofloxacin for ear infection  Call us or Dr Henrietta Gilliam if pain worsens or if you have fevers, redness or swelling

## 2021-07-06 DIAGNOSIS — F41.8 DEPRESSION WITH ANXIETY: ICD-10-CM

## 2021-07-06 RX ORDER — LORAZEPAM 2 MG/1
2 TABLET ORAL 2 TIMES DAILY PRN
Qty: 60 TABLET | Refills: 5 | Status: SHIPPED | OUTPATIENT
Start: 2021-07-06 | End: 2022-01-31 | Stop reason: SDUPTHER

## 2021-07-06 NOTE — TELEPHONE ENCOUNTER
Patient requesting refill(s) of:LORazepam (ATIVAN) 2 mg tablet    Last filled:3/14/21  Last appt:6/22/21  Next appt:11/15/21  Pharmacy:[id number] [place], [place] - [address] [place] [address]

## 2021-07-20 PROBLEM — H72.92 TYMPANIC MEMBRANE PERFORATION, LEFT: Status: ACTIVE | Noted: 2021-07-20

## 2021-07-20 PROBLEM — H90.A32 MIXED CONDUCTIVE AND SENSORINEURAL HEARING LOSS OF LEFT EAR WITH RESTRICTED HEARING OF RIGHT EAR: Status: ACTIVE | Noted: 2021-07-20

## 2021-08-13 DIAGNOSIS — F41.9 ANXIETY: ICD-10-CM

## 2021-08-13 RX ORDER — BUSPIRONE HYDROCHLORIDE 5 MG/1
TABLET ORAL
Qty: 180 TABLET | Refills: 1 | Status: SHIPPED | OUTPATIENT
Start: 2021-08-13 | End: 2021-12-15 | Stop reason: SDUPTHER

## 2021-08-16 DIAGNOSIS — I10 HYPERTENSION, UNSPECIFIED TYPE: ICD-10-CM

## 2021-08-16 RX ORDER — LISINOPRIL 20 MG/1
20 TABLET ORAL DAILY
Qty: 90 TABLET | Refills: 1 | Status: SHIPPED | OUTPATIENT
Start: 2021-08-16 | End: 2021-11-22 | Stop reason: SDUPTHER

## 2021-08-16 NOTE — TELEPHONE ENCOUNTER
Patient requesting refill(s) of:    Lisinopril (Zestril) 20 Mg   Last filled:2/11/21  Last appt:6/22/21  Next appt:11/15/21  Pharmacy:[facility], [place],    Patient to run out on weds,

## 2021-08-23 DIAGNOSIS — F33.1 MODERATE EPISODE OF RECURRENT MAJOR DEPRESSIVE DISORDER (HCC): ICD-10-CM

## 2021-08-23 RX ORDER — FLUOXETINE HYDROCHLORIDE 40 MG/1
CAPSULE ORAL
Qty: 90 CAPSULE | Refills: 1 | Status: SHIPPED | OUTPATIENT
Start: 2021-08-23 | End: 2021-12-15 | Stop reason: SDUPTHER

## 2021-08-24 ENCOUNTER — TELEPHONE (OUTPATIENT)
Dept: FAMILY MEDICINE CLINIC | Facility: CLINIC | Age: 71
End: 2021-08-24

## 2021-08-24 NOTE — TELEPHONE ENCOUNTER
Patient called requesting refill of prozac  Advised her refill was sent yesterday  She verbalized understanding

## 2021-08-27 ENCOUNTER — APPOINTMENT (EMERGENCY)
Dept: CT IMAGING | Facility: HOSPITAL | Age: 71
End: 2021-08-27
Payer: COMMERCIAL

## 2021-08-27 ENCOUNTER — TELEPHONE (OUTPATIENT)
Dept: FAMILY MEDICINE CLINIC | Facility: CLINIC | Age: 71
End: 2021-08-27

## 2021-08-27 ENCOUNTER — HOSPITAL ENCOUNTER (INPATIENT)
Facility: HOSPITAL | Age: 71
LOS: 3 days | Discharge: HOME | DRG: 141 | End: 2021-08-30
Attending: INTERNAL MEDICINE | Admitting: INTERNAL MEDICINE
Payer: COMMERCIAL

## 2021-08-27 ENCOUNTER — HOSPITAL ENCOUNTER (EMERGENCY)
Facility: HOSPITAL | Age: 71
End: 2021-08-27
Attending: EMERGENCY MEDICINE
Payer: COMMERCIAL

## 2021-08-27 VITALS
DIASTOLIC BLOOD PRESSURE: 70 MMHG | HEART RATE: 76 BPM | TEMPERATURE: 97.2 F | WEIGHT: 161 LBS | HEIGHT: 66 IN | SYSTOLIC BLOOD PRESSURE: 122 MMHG | BODY MASS INDEX: 25.88 KG/M2 | RESPIRATION RATE: 20 BRPM | OXYGEN SATURATION: 97 %

## 2021-08-27 DIAGNOSIS — M27.2 ABSCESS OF JAW: ICD-10-CM

## 2021-08-27 DIAGNOSIS — M27.2 OSTEOMYELITIS OF MANDIBLE: Primary | ICD-10-CM

## 2021-08-27 DIAGNOSIS — K04.7 DENTAL ABSCESS: Primary | ICD-10-CM

## 2021-08-27 PROBLEM — I10 ESSENTIAL HYPERTENSION: Status: ACTIVE | Noted: 2021-08-27

## 2021-08-27 LAB
ANION GAP SERPL CALCULATED.3IONS-SCNC: 6 MMOL/L (ref 4–13)
BASOPHILS # BLD AUTO: 0 THOUSANDS/ΜL (ref 0–0.1)
BASOPHILS NFR BLD AUTO: 0 % (ref 0–2)
BUN SERPL-MCNC: 17 MG/DL (ref 7–25)
CALCIUM SERPL-MCNC: 9.2 MG/DL (ref 8.6–10.5)
CHLORIDE SERPL-SCNC: 100 MMOL/L (ref 98–107)
CO2 SERPL-SCNC: 29 MMOL/L (ref 21–31)
CREAT SERPL-MCNC: 0.85 MG/DL (ref 0.6–1.2)
EOSINOPHIL # BLD AUTO: 0.2 THOUSAND/ΜL (ref 0–0.61)
EOSINOPHIL NFR BLD AUTO: 3 % (ref 0–5)
ERYTHROCYTE [DISTWIDTH] IN BLOOD BY AUTOMATED COUNT: 12.4 % (ref 11.5–14.5)
GFR SERPL CREATININE-BSD FRML MDRD: 70 ML/MIN/1.73SQ M
GLUCOSE SERPL-MCNC: 93 MG/DL (ref 65–99)
HCT VFR BLD AUTO: 40 % (ref 42–47)
HGB BLD-MCNC: 13.6 G/DL (ref 12–16)
LYMPHOCYTES # BLD AUTO: 0.8 THOUSANDS/ΜL (ref 0.6–4.47)
LYMPHOCYTES NFR BLD AUTO: 13 % (ref 21–51)
MCH RBC QN AUTO: 33 PG (ref 26–34)
MCHC RBC AUTO-ENTMCNC: 34 G/DL (ref 31–37)
MCV RBC AUTO: 97 FL (ref 81–99)
MONOCYTES # BLD AUTO: 0.5 THOUSAND/ΜL (ref 0.17–1.22)
MONOCYTES NFR BLD AUTO: 7 % (ref 2–12)
NEUTROPHILS # BLD AUTO: 4.9 THOUSANDS/ΜL (ref 1.4–6.5)
NEUTS SEG NFR BLD AUTO: 77 % (ref 42–75)
PLATELET # BLD AUTO: 223 THOUSANDS/UL (ref 149–390)
PMV BLD AUTO: 8.2 FL (ref 8.6–11.7)
POTASSIUM SERPL-SCNC: 4.2 MMOL/L (ref 3.5–5.5)
PROCALCITONIN SERPL-MCNC: <0.05 NG/ML
RBC # BLD AUTO: 4.13 MILLION/UL (ref 3.9–5.2)
SODIUM SERPL-SCNC: 135 MMOL/L (ref 134–143)
WBC # BLD AUTO: 6.4 THOUSAND/UL (ref 4.8–10.8)

## 2021-08-27 PROCEDURE — 70487 CT MAXILLOFACIAL W/DYE: CPT

## 2021-08-27 PROCEDURE — 99285 EMERGENCY DEPT VISIT HI MDM: CPT

## 2021-08-27 PROCEDURE — 96365 THER/PROPH/DIAG IV INF INIT: CPT

## 2021-08-27 PROCEDURE — 80048 BASIC METABOLIC PNL TOTAL CA: CPT | Performed by: PHYSICIAN ASSISTANT

## 2021-08-27 PROCEDURE — 96375 TX/PRO/DX INJ NEW DRUG ADDON: CPT

## 2021-08-27 PROCEDURE — 99223 1ST HOSP IP/OBS HIGH 75: CPT | Performed by: INTERNAL MEDICINE

## 2021-08-27 PROCEDURE — 87040 BLOOD CULTURE FOR BACTERIA: CPT | Performed by: PHYSICIAN ASSISTANT

## 2021-08-27 PROCEDURE — 36415 COLL VENOUS BLD VENIPUNCTURE: CPT | Performed by: PHYSICIAN ASSISTANT

## 2021-08-27 PROCEDURE — 99285 EMERGENCY DEPT VISIT HI MDM: CPT | Performed by: PHYSICIAN ASSISTANT

## 2021-08-27 PROCEDURE — 85025 COMPLETE CBC W/AUTO DIFF WBC: CPT | Performed by: PHYSICIAN ASSISTANT

## 2021-08-27 PROCEDURE — 84145 PROCALCITONIN (PCT): CPT | Performed by: PHYSICIAN ASSISTANT

## 2021-08-27 RX ORDER — LORAZEPAM 1 MG/1
2 TABLET ORAL 2 TIMES DAILY PRN
Status: DISCONTINUED | OUTPATIENT
Start: 2021-08-27 | End: 2021-08-30 | Stop reason: HOSPADM

## 2021-08-27 RX ORDER — BUSPIRONE HYDROCHLORIDE 5 MG/1
5 TABLET ORAL 2 TIMES DAILY
Status: DISCONTINUED | OUTPATIENT
Start: 2021-08-27 | End: 2021-08-30 | Stop reason: HOSPADM

## 2021-08-27 RX ORDER — CLINDAMYCIN PHOSPHATE 900 MG/50ML
900 INJECTION INTRAVENOUS ONCE
Status: COMPLETED | OUTPATIENT
Start: 2021-08-27 | End: 2021-08-27

## 2021-08-27 RX ORDER — OXYCODONE HYDROCHLORIDE 5 MG/1
5 TABLET ORAL EVERY 4 HOURS PRN
Status: DISCONTINUED | OUTPATIENT
Start: 2021-08-27 | End: 2021-08-30 | Stop reason: HOSPADM

## 2021-08-27 RX ORDER — ONDANSETRON 2 MG/ML
4 INJECTION INTRAMUSCULAR; INTRAVENOUS EVERY 6 HOURS PRN
Status: DISCONTINUED | OUTPATIENT
Start: 2021-08-27 | End: 2021-08-30 | Stop reason: HOSPADM

## 2021-08-27 RX ORDER — LEVOTHYROXINE SODIUM 0.1 MG/1
100 TABLET ORAL DAILY
Status: DISCONTINUED | OUTPATIENT
Start: 2021-08-28 | End: 2021-08-30 | Stop reason: HOSPADM

## 2021-08-27 RX ORDER — SODIUM CHLORIDE 9 MG/ML
75 INJECTION, SOLUTION INTRAVENOUS CONTINUOUS
Status: DISCONTINUED | OUTPATIENT
Start: 2021-08-27 | End: 2021-08-30 | Stop reason: HOSPADM

## 2021-08-27 RX ORDER — FLUOXETINE HYDROCHLORIDE 20 MG/1
40 CAPSULE ORAL DAILY
Status: DISCONTINUED | OUTPATIENT
Start: 2021-08-28 | End: 2021-08-30 | Stop reason: HOSPADM

## 2021-08-27 RX ORDER — KETOROLAC TROMETHAMINE 30 MG/ML
15 INJECTION, SOLUTION INTRAMUSCULAR; INTRAVENOUS EVERY 6 HOURS PRN
Status: DISPENSED | OUTPATIENT
Start: 2021-08-27 | End: 2021-08-29

## 2021-08-27 RX ORDER — MORPHINE SULFATE 4 MG/ML
4 INJECTION, SOLUTION INTRAMUSCULAR; INTRAVENOUS ONCE
Status: COMPLETED | OUTPATIENT
Start: 2021-08-27 | End: 2021-08-27

## 2021-08-27 RX ORDER — LISINOPRIL 20 MG/1
20 TABLET ORAL DAILY
Status: DISCONTINUED | OUTPATIENT
Start: 2021-08-28 | End: 2021-08-30 | Stop reason: HOSPADM

## 2021-08-27 RX ORDER — ACETAMINOPHEN 325 MG/1
975 TABLET ORAL EVERY 8 HOURS SCHEDULED
Status: DISCONTINUED | OUTPATIENT
Start: 2021-08-27 | End: 2021-08-30 | Stop reason: HOSPADM

## 2021-08-27 RX ORDER — KETOROLAC TROMETHAMINE 30 MG/ML
30 INJECTION, SOLUTION INTRAMUSCULAR; INTRAVENOUS ONCE
Status: COMPLETED | OUTPATIENT
Start: 2021-08-27 | End: 2021-08-27

## 2021-08-27 RX ADMIN — CLINDAMYCIN PHOSPHATE 900 MG: 900 INJECTION, SOLUTION INTRAVENOUS at 15:33

## 2021-08-27 RX ADMIN — ACETAMINOPHEN 975 MG: 325 TABLET, FILM COATED ORAL at 22:24

## 2021-08-27 RX ADMIN — KETOROLAC TROMETHAMINE 30 MG: 30 INJECTION, SOLUTION INTRAMUSCULAR; INTRAVENOUS at 14:00

## 2021-08-27 RX ADMIN — BUSPIRONE HYDROCHLORIDE 5 MG: 5 TABLET ORAL at 20:51

## 2021-08-27 RX ADMIN — SODIUM CHLORIDE 75 ML/HR: 0.9 INJECTION, SOLUTION INTRAVENOUS at 19:24

## 2021-08-27 RX ADMIN — IOHEXOL 100 ML: 350 INJECTION, SOLUTION INTRAVENOUS at 12:56

## 2021-08-27 RX ADMIN — SODIUM CHLORIDE 3 G: 9 INJECTION, SOLUTION INTRAVENOUS at 19:24

## 2021-08-27 RX ADMIN — MORPHINE SULFATE 4 MG: 4 INJECTION INTRAVENOUS at 14:38

## 2021-08-27 RX ADMIN — MORPHINE SULFATE 2 MG: 2 INJECTION, SOLUTION INTRAMUSCULAR; INTRAVENOUS at 17:35

## 2021-08-27 RX ADMIN — MORPHINE SULFATE 2 MG: 2 INJECTION, SOLUTION INTRAMUSCULAR; INTRAVENOUS at 22:27

## 2021-08-27 RX ADMIN — OXYCODONE HYDROCHLORIDE 5 MG: 5 TABLET ORAL at 20:51

## 2021-08-27 NOTE — TELEPHONE ENCOUNTER
Called and spoke with patient to obtain more information  She reports slight jaw swelling x1 week  States this morning she woke up and jaw is even more swelling, difficult to talk, able to swollen, pain  Spoke with provider, patient should go to ED to be evaluated and have possible imaging completed  Patient verbalized an understanding and all questions answered

## 2021-08-27 NOTE — ED PROVIDER NOTES
History  Chief Complaint   Patient presents with    Jaw Swelling     x 1 week lower jaw     36-year-old female presents to the emergency department complaining of lower jaw swelling and has been gradually worsening over last 1 week  She states she has a history of mouth and throat cancer for which she was treated by ENT and received chemotherapy and radiation  She states she also had extensive dental work done secondarily to the cancer  She states that in her lower mandible she had multiple root canals and dental hardware placed  She has obvious pronounced swelling of the bilateral lower mandible and chin  She is exquisitely tender over these areas  She denies any known trauma or injury to the area  She denies any other complaints  Allergies reviewed          Prior to Admission Medications   Prescriptions Last Dose Informant Patient Reported? Taking?    ASPIRIN 81 PO  Self Yes Yes   Sig: Take 81 mg by mouth daily   Ascorbic Acid (LETICIA-C PO)  Self Yes Yes   Sig: Take 1,000 mg by mouth daily   FLUoxetine (PROzac) 40 MG capsule   No No   Sig: TAKE 1 CAPSULE BY MOUTH EVERY DAY   LORazepam (ATIVAN) 2 mg tablet  Self No Yes   Sig: Take 1 tablet (2 mg total) by mouth 2 (two) times a day as needed for anxiety   MUPIROCIN EX Not Taking at Unknown time Self Yes No   Sig: Apply topically daily   Patient not taking: Reported on 6/22/2021   Melatonin 10 MG TABS  Self Yes Yes   Sig: Take by mouth once at bedtime   Multiple Vitamin (MULTIVITAMIN) tablet  Self Yes No   Sig: Take 1 tablet by mouth daily   TURMERIC PO  Self Yes Yes   Sig: Take 1,000 mg by mouth daily   Vitamin D, Cholecalciferol, 50 MCG (2000 UT) CAPS  Self Yes Yes   Sig: Take by mouth daily   aspirin 325 mg tablet Not Taking at Unknown time Self Yes No   Sig: Take 81 mg by mouth daily    Patient not taking: Reported on 6/22/2021   busPIRone (BUSPAR) 5 mg tablet   No No   Sig: TAKE 1 TABLET BY MOUTH TWICE A DAY   ketoconazole (NIZORAL) 2 % shampoo  Self Yes No   Sig: Apply topically   Patient not taking: Reported on 6/22/2021   levocetirizine (XYZAL) 5 MG tablet  Self No No   Sig: Take 1 tablet (5 mg total) by mouth every evening   levothyroxine 100 mcg tablet  Self No Yes   Sig: Take 1 tablet (100 mcg total) by mouth daily   lisinopril (ZESTRIL) 20 mg tablet   No Yes   Sig: Take 1 tablet (20 mg total) by mouth daily   mometasone (ELOCON) 0 1 % ointment Not Taking at Unknown time Self Yes No   Sig: Apply topically daily   Patient not taking: Reported on 6/22/2021   ofloxacin (FLOXIN) 0 3 % otic solution  Self No Yes   Sig: Administer 5 drops into both ears 2 (two) times a day   ofloxacin (FLOXIN) 0 3 % otic solution Not Taking at Unknown time Self No No   Sig: Administer 5 drops into both ears 2 (two) times a day   Patient not taking: Reported on 7/20/2021   predniSONE 10 mg tablet Not Taking at Unknown time Self No No   Sig: Take 1 tablet (10 mg total) by mouth 2 (two) times a day with meals   Patient not taking: Reported on 6/22/2021   vitamin B-12 (VITAMIN B-12) 500 mcg tablet  Self Yes Yes   Sig: Take 5,000 mcg by mouth daily      Facility-Administered Medications: None       Past Medical History:   Diagnosis Date    Anxiety     Cancer ([facility] [address] ) ~2009    Tonsils- followed by Oncology( Dr Juana Huynh, Dr Charisse Huber)    Disease of thyroid gland        Past Surgical History:   Procedure Laterality Date    Luther Kimble       Family History   Problem Relation Age of Onset    No Known Problems Mother     No Known Problems Father     Breast cancer Sister 27    No Known Problems Daughter     No Known Problems Maternal Grandmother     No Known Problems Paternal Grandmother     No Known Problems Sister     No Known Problems Sister     No Known Problems Sister     No Known Problems Maternal Aunt     No Known Problems Maternal Aunt      I have reviewed and agree with the history as documented  E-Cigarette/Vaping    E-Cigarette Use Never User      E-Cigarette/Vaping Substances    Nicotine No     THC No     CBD No     Flavoring No     Other No     Unknown No      Social History     Tobacco Use    Smoking status: Former Smoker    Smokeless tobacco: Never Used   Vaping Use    Vaping Use: Never used   Substance Use Topics    Alcohol use: Yes    Drug use: Not on file       Review of Systems   Constitutional: Negative for chills, fatigue and fever  HENT: Positive for dental problem and facial swelling  Negative for congestion, ear pain, rhinorrhea, sinus pressure, sneezing, sore throat and trouble swallowing  Eyes: Negative for discharge and itching  Respiratory: Negative for cough, chest tightness, shortness of breath, wheezing and stridor  Cardiovascular: Negative for chest pain and palpitations  Gastrointestinal: Negative for abdominal pain, diarrhea, nausea and vomiting  Neurological: Negative for dizziness, syncope, numbness and headaches  All other systems reviewed and are negative  Physical Exam  Physical Exam  Vitals and nursing note reviewed  Constitutional:       General: She is not in acute distress  Appearance: Normal appearance  She is well-developed  She is ill-appearing  She is not diaphoretic  HENT:      Head: Normocephalic and atraumatic  Jaw: Tenderness, swelling and pain on movement present  Comments: Diffuse lower jaw tenderness and swelling  Right Ear: External ear normal       Left Ear: External ear normal       Nose: Nose normal    Eyes:      Conjunctiva/sclera: Conjunctivae normal       Pupils: Pupils are equal, round, and reactive to light  Cardiovascular:      Rate and Rhythm: Normal rate and regular rhythm  Heart sounds: Normal heart sounds  No murmur heard  No friction rub  No gallop  Pulmonary:      Effort: Pulmonary effort is normal  No respiratory distress  Breath sounds: Normal breath sounds   No stridor  No wheezing or rales  Abdominal:      General: Bowel sounds are normal  There is no distension  Palpations: Abdomen is soft  Tenderness: There is no abdominal tenderness  There is no guarding  Musculoskeletal:         General: No tenderness  Normal range of motion  Cervical back: Normal range of motion  Skin:     General: Skin is warm  Capillary Refill: Capillary refill takes less than 2 seconds  Neurological:      Mental Status: She is alert and oriented to person, place, and time  Psychiatric:         Behavior: Behavior is cooperative  Vital Signs  ED Triage Vitals   Temperature Pulse Respirations Blood Pressure SpO2   08/27/21 1122 08/27/21 1122 08/27/21 1122 08/27/21 1122 08/27/21 1122   98 1 °F (36 7 °C) 76 20 146/86 99 %      Temp Source Heart Rate Source Patient Position - Orthostatic VS BP Location FiO2 (%)   08/27/21 1122 08/27/21 1552 08/27/21 1552 08/27/21 1122 --   Oral Monitor Sitting Left arm       Pain Score       08/27/21 1122       Worst Possible Pain           Vitals:    08/27/21 1122 08/27/21 1552   BP: 146/86 120/68   Pulse: 76 76   Patient Position - Orthostatic VS:  Sitting         Visual Acuity      ED Medications  Medications   iohexol (OMNIPAQUE) 350 MG/ML injection (SINGLE-DOSE) 100 mL (100 mL Intravenous Given 8/27/21 1256)   ketorolac (TORADOL) injection 30 mg (30 mg Intravenous Given 8/27/21 1400)   morphine (PF) 4 mg/mL injection 4 mg (4 mg Intravenous Given 8/27/21 1438)   clindamycin (CLEOCIN) IVPB (premix in dextrose) 900 mg 50 mL (900 mg Intravenous New Bag 8/27/21 1533)       Diagnostic Studies  Results Reviewed     Procedure Component Value Units Date/Time    Blood culture #2 [[id number]] Collected: 08/27/21 1527    Lab Status: In process Specimen: Blood from Arm, Left Updated: 08/27/21 1532    Blood culture #1 [[id number]] Collected: 08/27/21 1527    Lab Status:  In process Specimen: Blood from Arm, Left Updated: 08/27/21 1532 Procalcitonin with AM Reflex [[id number]] Collected: 08/27/21 1527    Lab Status:  In process Specimen: Blood from Arm, Left Updated: 08/27/21 3788    Basic metabolic panel [[id number]] Collected: 08/27/21 1207    Lab Status: Final result Specimen: Blood from Arm, Right Updated: 08/27/21 1232     Sodium 135 mmol/L      Potassium 4 2 mmol/L      Chloride 100 mmol/L      CO2 29 mmol/L      ANION GAP 6 mmol/L      BUN 17 mg/dL      Creatinine 0 85 mg/dL      Glucose 93 mg/dL      Calcium 9 2 mg/dL      eGFR 70 ml/min/1 73sq m     Narrative:      [place] guidelines for Chronic Kidney Disease (CKD):     Stage 1 with normal or high GFR (GFR > 90 mL/min/1 73 square meters)    Stage 2 Mild CKD (GFR = 60-89 mL/min/1 73 square meters)    Stage 3A Moderate CKD (GFR = 45-59 mL/min/1 73 square meters)    Stage 3B Moderate CKD (GFR = 30-44 mL/min/1 73 square meters)    Stage 4 Severe CKD (GFR = 15-29 mL/min/1 73 square meters)    Stage 5 End Stage CKD (GFR <15 mL/min/1 73 square meters)  Note: GFR calculation is accurate only with a steady state creatinine    CBC and differential [[id number]]  (Abnormal) Collected: 08/27/21 1207    Lab Status: Final result Specimen: Blood from Arm, Right Updated: 08/27/21 1220     WBC 6 40 Thousand/uL      RBC 4 13 Million/uL      Hemoglobin 13 6 g/dL      Hematocrit 40 0 %      MCV 97 fL      MCH 33 0 pg      MCHC 34 0 g/dL      RDW 12 4 %      MPV 8 2 fL      Platelets 838 Thousands/uL      Neutrophils Relative 77 %      Lymphocytes Relative 13 %      Monocytes Relative 7 %      Eosinophils Relative 3 %      Basophils Relative 0 %      Neutrophils Absolute 4 90 Thousands/µL      Lymphocytes Absolute 0 80 Thousands/µL      Monocytes Absolute 0 50 Thousand/µL      Eosinophils Absolute 0 20 Thousand/µL      Basophils Absolute 0 00 Thousands/µL                  CT facial bones with contrast   Final Result by Twila Aguiar MD (08/27 1422)      Findings are suspicious for small subperiosteal abscess along outer cortex of right mandibular symphysis with associated focal erosive osteomyelitis and adjacent cellulitis  Right mandibular lateral incisor and right maxillary canine teeth are likely    odontogenic sources of infection  Difficult to exclude an underlying osseous lesion given diffuse inflammation  Recommend dental consultation for further evaluation and short-term follow-up CT maxillofacial with contrast                   Workstation performed: [id number]                    Procedures  Procedures         ED Course  ED Course as of Aug 27 1617   Fri Aug 27, 2021   1530  discussed with Oral surgery  1543 IV clindamycin ordered at the request of oral surgery  SBIRT 22yo+      Most Recent Value   SBIRT (24 yo +)   In order to provide better care to our patients, we are screening all of our patients for alcohol and drug use  Would it be okay to ask you these screening questions? Yes Filed at: 08/27/2021 1133   Initial Alcohol Screen: US AUDIT-C    1  How often do you have a drink containing alcohol? 1 Filed at: 08/27/2021 1133   2  How many drinks containing alcohol do you have on a typical day you are drinking? 1 Filed at: 08/27/2021 1133   3b  FEMALE Any Age, or MALE 65+: How often do you have 4 or more drinks on one occassion? 0 Filed at: 08/27/2021 1133   Audit-C Score  2 Filed at: 08/27/2021 1133   [place]: How many times in the past year have you    Used an illegal drug or used a prescription medication for non-medical reasons? Never Filed at: 08/27/2021 1133                    MDM  Number of Diagnoses or Management Options  Abscess of jaw  Osteomyelitis of mandible  Diagnosis management comments: Case discussed with Oral surgery  Patient be transferred to [facility] for further treatment and evaluation  Per the request of oral surgery blood cultures were obtained IV clindamycin was given         Amount and/or Complexity of Data Reviewed  Clinical lab tests: ordered and reviewed  Tests in the radiology section of CPT®: ordered and reviewed    Risk of Complications, Morbidity, and/or Mortality  Presenting problems: moderate  Diagnostic procedures: low  Management options: low    Patient Progress  Patient progress: stable      Disposition  Final diagnoses:   Osteomyelitis of mandible   Abscess of jaw     Time reflects when diagnosis was documented in both MDM as applicable and the Disposition within this note     Time User Action Codes Description Comment    8/27/2021  3:59 PM Eva Albe Add [M27 2] Osteomyelitis of mandible     8/27/2021  3:59 PM Eva Albe Add [M27 2] Abscess of jaw       ED Disposition     ED Disposition Condition Date/Time Comment    Transfer to Another Facility-In Network  Fri Aug 27, 2021  3:53 PM Zuleyma Lazar should be transferred out to B          MD Documentation      Most Recent Value   Patient Condition  The patient has been stabilized such that within reasonable medical probability, no material deterioration of the patient condition or the condition of the unborn child(adeel) is likely to result from the transfer   Reason for Transfer  Level of Care needed not available at this facility [Oral Surgery]   Benefits of Transfer  Specialized equipment and/or services available at the receiving facility (Include comment)________________________ [Oral/Facial Surgery]   Risks of Transfer  Potential for delay in receiving treatment, Potential deterioration of medical condition, Loss of IV, Increased discomfort during transfer, Possible worsening of condition or death during transfer   Accepting Physician  Gerardo Stahl MD, PA-C   Provider Certification  General risk, such as traffic hazards, adverse weather conditions, rough terrain or turbulence, possible failure of equipment (including vehicle or aircraft), or consequences of actions of persons outside the control of the transport personnel, Unanticipated needs of medical equipment and personnel during transport, Risk of worsening condition, The possibility of a transport vehicle being unavailable      RN Documentation      [address], [address]      Follow-up Information    None         Patient's Medications   Discharge Prescriptions    No medications on file     No discharge procedures on file      PDMP Review       Value Time User    PDMP Reviewed  Yes 12/8/2020 12:35 PM Reji Cabezas MD          ED Provider  Electronically Signed by           Jill Cameron PA-C  08/27/21 5570

## 2021-08-27 NOTE — TELEPHONE ENCOUNTER
Pt called and stated that the bottom of her jaw is completely swollen and painful, can hardly talk  On set about a week, but woke up last night with the swelling being really bad      She was asking to be seen    Please advise    Phone:  [phone]

## 2021-08-27 NOTE — PLAN OF CARE
Pt arrived from OSH ambulated to bed from megan  Pt reports pain controled 2-3 at this time   Regular diet until midnight and NPO for oral surgery consult in AM

## 2021-08-27 NOTE — ASSESSMENT & PLAN NOTE
Patient presented to [facility] ED complaining of lower jaw swelling and has been gradually worsening over last 1 week  She is afebrile without leukocytosis  CT scan findings are suspicious for small subperiosteal abscess along outer cortex of right mandibular symphysis with associated focal erosive osteomyelitis and adjacent cellulitis  Right mandibular lateral incisor and right maxillary canine teeth are likely   odontogenic sources of infection  Difficult to exclude an underlying osseous lesion given diffuse inflammation      Patient was transferred to [facility] for oral surgery evaluation and treatment  Start IV Unasyn  Consult OMFS  Pain control  IV fluid hydration

## 2021-08-27 NOTE — ASSESSMENT & PLAN NOTE
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

## 2021-08-27 NOTE — EMTALA/ACUTE CARE TRANSFER
[address]  [address] [place] [address]  [phone]  Dept: [phone]      EMTALA TRANSFER CONSENT    NAME Jett OSBORN 1950                              MRN [id number]    I have been informed of my rights regarding examination, treatment, and transfer   by Dr Carla Boudreaux , *    Benefits: Specialized equipment and/or services available at the receiving facility (Include comment)________________________ (Oral/Facial Surgery)    Risks: Potential for delay in receiving treatment, Potential deterioration of medical condition, Loss of IV, Increased discomfort during transfer, Possible worsening of condition or death during transfer      Consent for Transfer:  I acknowledge that my medical condition has been evaluated and explained to me by the emergency department physician or other qualified medical person and/or my attending physician, who has recommended that I be transferred to the service of  Accepting Physician: Dr Jeffery Lou at [address] [facility] Name, [address]  The above potential benefits of such transfer, the potential risks associated with such transfer, and the probable risks of not being transferred have been explained to me, and I fully understand them  The doctor has explained that, in my case, the benefits of transfer outweigh the risks  I agree to be transferred  I authorize the performance of emergency medical procedures and treatments upon me in both transit and upon arrival at the receiving facility  Additionally, I authorize the release of any and all medical records to the receiving facility and request they be transported with me, if possible  I understand that the safest mode of transportation during a medical emergency is an ambulance and that the Hospital advocates the use of this mode of transport   Risks of traveling to the receiving facility by car, including absence of medical control, life sustaining equipment, such as oxygen, and medical personnel has been explained to me and I fully understand them  (MAG CORRECT BOX BELOW)  [  ]  I consent to the stated transfer and to be transported by ambulance/helicopter  [  ]  I consent to the stated transfer, but refuse transportation by ambulance and accept full responsibility for my transportation by car  I understand the risks of non-ambulance transfers and I exonerate the Hospital and its staff from any deterioration in my condition that results from this refusal     X___________________________________________    DATE  21  TIME________  Signature of patient or legally responsible individual signing on patient behalf           RELATIONSHIP TO PATIENT_________________________          Provider Certification    NAME Jett Raphael                                        [facility] 1950                              MRN [id number]    A medical screening exam was performed on the above named patient  Based on the examination:    Condition Necessitating Transfer The primary encounter diagnosis was Osteomyelitis of mandible  A diagnosis of Abscess of jaw was also pertinent to this visit      Patient Condition: The patient has been stabilized such that within reasonable medical probability, no material deterioration of the patient condition or the condition of the unborn child(adeel) is likely to result from the transfer    Reason for Transfer: Level of Care needed not available at this facility (Oral Surgery)    [facility]   · Space available and qualified personnel available for treatment as acknowledged by    · Agreed to accept transfer and to provide appropriate medical treatment as acknowledged by       Dr Jeffery Lou  · Appropriate medical records of the examination and treatment of the patient are provided at the time of transfer   [address] _______  · Transfer will be performed by qualified personnel from    and appropriate transfer equipment as required, including the use of necessary and appropriate life support measures  Provider Certification: I have examined the patient and explained the following risks and benefits of being transferred/refusing transfer to the patient/family:  General risk, such as traffic hazards, adverse weather conditions, rough terrain or turbulence, possible failure of equipment (including vehicle or aircraft), or consequences of actions of persons outside the control of the transport personnel, Unanticipated needs of medical equipment and personnel during transport, Risk of worsening condition, The possibility of a transport vehicle being unavailable      Based on these reasonable risks and benefits to the patient and/or the unborn child(adeel), and based upon the information available at the time of the patients examination, I certify that the medical benefits reasonably to be expected from the provision of appropriate medical treatments at another medical facility outweigh the increasing risks, if any, to the individuals medical condition, and in the case of labor to the unborn child, from effecting the transfer      X____________________________________________ DATE 08/27/21        TIME_______      ORIGINAL - SEND TO MEDICAL RECORDS   COPY - SEND WITH PATIENT DURING TRANSFER

## 2021-08-27 NOTE — H&P
[address] [place] [address]  H&P- [facility] 1950, 72 y o  female MRN: [id number]  Unit/Bed#: -Horacio Encounter: [id number]  Primary Care Provider: MARY Humphries   Date and time admitted to hospital: 8/27/2021  6:34 PM    * Dental abscess  Assessment & Plan  Patient presented to [facility] ED complaining of lower jaw swelling and has been gradually worsening over last 1 week  She is afebrile without leukocytosis  CT scan findings are suspicious for small subperiosteal abscess along outer cortex of right mandibular symphysis with associated focal erosive osteomyelitis and adjacent cellulitis  Right mandibular lateral incisor and right maxillary canine teeth are likely   odontogenic sources of infection  Difficult to exclude an underlying osseous lesion given diffuse inflammation  Patient was transferred to [address] [place] [address] for oral surgery evaluation and treatment  Start IV Unasyn  Consult OMFS  Pain control  IV fluid hydration    Malignant neoplasm of tonsil [facility])  Assessment & Plan  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 treated with chemoradiation completed in 2008 evidence of recurrence and declared cancer free in 2013      Essential hypertension  Assessment & Plan  Continue lisinopril    Acquired hypothyroidism  Assessment & Plan  Continue levothyroxine    Depression with anxiety  Assessment & Plan  Continue Prozac and Buspar  P r n  Lorazepam    VTE Pharmacologic Prophylaxis: VTE Score: 6 High Risk (Score >/= 5) - Pharmacological DVT Prophylaxis Ordered: enoxaparin (Lovenox)  Sequential Compression Devices Ordered  Code Status:  Full code    Anticipated Length of Stay: Patient will be admitted on an inpatient basis with an anticipated length of stay of greater than 2 midnights secondary to Above      Total Time for Visit, including Counseling / Coordination of Care: 60 minutes Greater than 50% of this total time spent on direct patient counseling and coordination of care  Chief Complaint:   Dental abscess with osteomyelitis    History of Present Illness:  Jett Raphael is a 72 y o  female with a PMH of squamous cell carcinoma of tonsil, hypertension, hypothyroidism, depression and anxiety who presents to [facility] ED today complaining of lower jaw swelling and has been gradually worsening over last 1 week  She has a history of squamous cell carcinoma of tonsil for which she was treated by ENT and received chemotherapy and radiation  She states she also had extensive dental work done secondarily to the cancer  She states that in her lower mandible she had multiple root canals and dental hardware placed  She has obvious pronounced swelling of the bilateral lower mandible and chin  She is exquisitely tender over these areas  She denies any known trauma or injury to the area  She reports chronic dysphagia    [facility]    Review of Systems:  Review of Systems   Constitutional: Negative for chills and fever  HENT: Positive for dental problem, facial swelling and trouble swallowing  Respiratory: Negative for chest tightness and shortness of breath  Cardiovascular: Negative for chest pain, palpitations and leg swelling  Gastrointestinal: Negative for abdominal pain, blood in stool, diarrhea, nausea and vomiting  Endocrine: Negative for polyuria  Genitourinary: Negative for difficulty urinating and dysuria  Neurological: Negative for dizziness, speech difficulty and headaches  All other systems reviewed and are negative        Past Medical and Surgical History:   Past Medical History:   Diagnosis Date    Anxiety     Cancer [facility]) ~2009    Tonsils- followed by Oncology( Dr Kirby Drake, Dr Zaina Moreno)    Disease of thyroid gland        Past Surgical History:   Procedure Laterality Date    MOUTH SURGERY      MYRINGOPLASTY      TOE SURGERY Mandy Wallace [place]       Meds/Allergies:  Prior to Admission medications    Medication Sig Start Date End Date Taking?  Authorizing Provider   Ascorbic Acid (LETICIA-C PO) Take 1,000 mg by mouth daily    Historical Provider, MD   aspirin 325 mg tablet Take 81 mg by mouth daily   Patient not taking: Reported on 6/22/2021    Historical Provider, MD   ASPIRIN 81 PO Take 81 mg by mouth daily    Historical Provider, MD   busPIRone (BUSPAR) 5 mg tablet TAKE 1 TABLET BY MOUTH TWICE A DAY 8/13/21   MARY Gastelum   FLUoxetine (PROzac) 40 MG capsule TAKE 1 CAPSULE BY MOUTH EVERY DAY 8/23/21   MARY Gastelum   ketoconazole (NIZORAL) 2 % shampoo Apply topically  Patient not taking: Reported on 6/22/2021    Historical Provider, MD   levocetirizine (XYZAL) 5 MG tablet Take 1 tablet (5 mg total) by mouth every evening 5/20/21   MARY Mullins   levothyroxine 100 mcg tablet Take 1 tablet (100 mcg total) by mouth daily 5/20/21   MARY Gastelum   lisinopril (ZESTRIL) 20 mg tablet Take 1 tablet (20 mg total) by mouth daily 8/16/21   Orestes Briggs MD   LORazepam (ATIVAN) 2 mg tablet Take 1 tablet (2 mg total) by mouth 2 (two) times a day as needed for anxiety 7/6/21   MARY Mullins   Melatonin 10 MG TABS Take by mouth once at bedtime    Historical Provider, MD   mometasone (ELOCON) 0 1 % ointment Apply topically daily  Patient not taking: Reported on 6/22/2021    Historical Provider, MD   Multiple Vitamin (MULTIVITAMIN) tablet Take 1 tablet by mouth daily    Historical Provider, MD   MUPIROCIN EX Apply topically daily  Patient not taking: Reported on 6/22/2021    Historical Provider, MD   ofloxacin (FLOXIN) 0 3 % otic solution Administer 5 drops into both ears 2 (two) times a day 9/4/20   Radha Sommers PA-C   ofloxacin (FLOXIN) 0 3 % otic solution Administer 5 drops into both ears 2 (two) times a day  Patient not taking: Reported on 7/20/2021 1/5/21   Shlomo Grey,    predniSONE 10 mg tablet Take 1 tablet (10 mg total) by mouth 2 (two) times a day with meals  Patient not taking: Reported on 6/22/2021 5/20/21   MARY Christy   TURMERIC PO Take 1,000 mg by mouth daily    Historical Provider, MD   vitamin B-12 (VITAMIN B-12) 500 mcg tablet Take 5,000 mcg by mouth daily    Historical Provider, MD   Vitamin D, Cholecalciferol, 50 MCG (2000 UT) CAPS Take by mouth daily    Historical Provider, MD     I have reviewed home medications with patient personally  Allergies: No Known Allergies    Social History:  Marital Status:      Substance Use History:   Social History     Substance and Sexual Activity   Alcohol Use Yes     Social History     Tobacco Use   Smoking Status Former Smoker   Smokeless Tobacco Never Used     Social History     Substance and Sexual Activity   Drug Use Not on file       Family History:    Family History   Problem Relation Age of Onset    No Known Problems Mother     No Known Problems Father     Breast cancer Sister 27    No Known Problems Daughter     No Known Problems Maternal Grandmother     No Known Problems Paternal Grandmother     No Known Problems Sister     No Known Problems Sister     No Known Problems Sister     No Known Problems Maternal Aunt     No Known Problems Maternal Aunt      Physical Exam:     Vitals:   Blood Pressure: 146/71 (08/27/21 1844)  Pulse: 70 (08/27/21 1844)  Temperature: 97 7 °F (36 5 °C) (08/27/21 1843)  Respirations: 18 (08/27/21 1843)  SpO2: 95 % (08/27/21 1844)    Physical Exam  Vitals reviewed  Constitutional:       Appearance: Normal appearance  She is ill-appearing  HENT:      Head: Normocephalic and atraumatic  Mouth/Throat:      Mouth: Mucous membranes are moist       Comments: Diffuse lower jaw tenderness and swelling  Oral cavity with swelling and pain on mild movement  Eyes:      Extraocular Movements: Extraocular movements intact        Conjunctiva/sclera: Conjunctivae normal       Pupils: Pupils are equal, round, and reactive to light  Cardiovascular:      Rate and Rhythm: Normal rate and regular rhythm  Pulses: Normal pulses  Heart sounds: Normal heart sounds  No murmur heard  Pulmonary:      Effort: Pulmonary effort is normal  No respiratory distress  Breath sounds: Normal breath sounds  No wheezing  Abdominal:      General: Bowel sounds are normal  There is no distension  Palpations: Abdomen is soft  Tenderness: There is no abdominal tenderness  Musculoskeletal:         General: Normal range of motion  Cervical back: Normal range of motion and neck supple  Right lower leg: No edema  Left lower leg: No edema  Skin:     General: Skin is warm and dry  Findings: No rash  Neurological:      General: No focal deficit present  Mental Status: She is alert and oriented to person, place, and time  Cranial Nerves: No cranial nerve deficit  Psychiatric:         Mood and Affect: Mood normal           Additional Data:     Lab Results:  Results from last 7 days   Lab Units 08/27/21  1207   WBC Thousand/uL 6 40   HEMOGLOBIN g/dL 13 6   HEMATOCRIT % 40 0*   PLATELETS Thousands/uL 223   NEUTROS PCT % 77*   LYMPHS PCT % 13*   MONOS PCT % 7   EOS PCT % 3     Results from last 7 days   Lab Units 08/27/21  1207   SODIUM mmol/L 135   POTASSIUM mmol/L 4 2   CHLORIDE mmol/L 100   CO2 mmol/L 29   BUN mg/dL 17   CREATININE mg/dL 0 85   ANION GAP mmol/L 6   CALCIUM mg/dL 9 2   GLUCOSE RANDOM mg/dL 93                       Imaging:  Reviewed  No orders to display       EKG and Other Studies Reviewed on Admission:   · yes    ** Please Note: This note has been constructed using a voice recognition system   **

## 2021-08-28 ENCOUNTER — ANESTHESIA (INPATIENT)
Dept: PERIOP | Facility: HOSPITAL | Age: 71
DRG: 141 | End: 2021-08-28
Payer: COMMERCIAL

## 2021-08-28 ENCOUNTER — ANESTHESIA EVENT (INPATIENT)
Dept: PERIOP | Facility: HOSPITAL | Age: 71
DRG: 141 | End: 2021-08-28
Payer: COMMERCIAL

## 2021-08-28 LAB
ANION GAP SERPL CALCULATED.3IONS-SCNC: 1 MMOL/L (ref 4–13)
BASOPHILS # BLD AUTO: 0.03 THOUSANDS/ΜL (ref 0–0.1)
BASOPHILS NFR BLD AUTO: 1 % (ref 0–1)
BUN SERPL-MCNC: 15 MG/DL (ref 5–25)
CALCIUM SERPL-MCNC: 8.6 MG/DL (ref 8.3–10.1)
CHLORIDE SERPL-SCNC: 107 MMOL/L (ref 100–108)
CO2 SERPL-SCNC: 29 MMOL/L (ref 21–32)
CREAT SERPL-MCNC: 0.84 MG/DL (ref 0.6–1.3)
EOSINOPHIL # BLD AUTO: 0.17 THOUSAND/ΜL (ref 0–0.61)
EOSINOPHIL NFR BLD AUTO: 3 % (ref 0–6)
ERYTHROCYTE [DISTWIDTH] IN BLOOD BY AUTOMATED COUNT: 11.7 % (ref 11.6–15.1)
GFR SERPL CREATININE-BSD FRML MDRD: 71 ML/MIN/1.73SQ M
GLUCOSE SERPL-MCNC: 93 MG/DL (ref 65–140)
HCT VFR BLD AUTO: 39.4 % (ref 34.8–46.1)
HGB BLD-MCNC: 12.9 G/DL (ref 11.5–15.4)
IMM GRANULOCYTES # BLD AUTO: 0.02 THOUSAND/UL (ref 0–0.2)
IMM GRANULOCYTES NFR BLD AUTO: 0 % (ref 0–2)
LYMPHOCYTES # BLD AUTO: 0.69 THOUSANDS/ΜL (ref 0.6–4.47)
LYMPHOCYTES NFR BLD AUTO: 13 % (ref 14–44)
MAGNESIUM SERPL-MCNC: 2.1 MG/DL (ref 1.6–2.6)
MCH RBC QN AUTO: 32.7 PG (ref 26.8–34.3)
MCHC RBC AUTO-ENTMCNC: 32.7 G/DL (ref 31.4–37.4)
MCV RBC AUTO: 100 FL (ref 82–98)
MONOCYTES # BLD AUTO: 0.5 THOUSAND/ΜL (ref 0.17–1.22)
MONOCYTES NFR BLD AUTO: 9 % (ref 4–12)
NEUTROPHILS # BLD AUTO: 3.9 THOUSANDS/ΜL (ref 1.85–7.62)
NEUTS SEG NFR BLD AUTO: 74 % (ref 43–75)
NRBC BLD AUTO-RTO: 0 /100 WBCS
PLATELET # BLD AUTO: 197 THOUSANDS/UL (ref 149–390)
PMV BLD AUTO: 10.1 FL (ref 8.9–12.7)
POTASSIUM SERPL-SCNC: 4.3 MMOL/L (ref 3.5–5.3)
RBC # BLD AUTO: 3.94 MILLION/UL (ref 3.81–5.12)
SODIUM SERPL-SCNC: 137 MMOL/L (ref 136–145)
WBC # BLD AUTO: 5.31 THOUSAND/UL (ref 4.31–10.16)

## 2021-08-28 PROCEDURE — 88305 TISSUE EXAM BY PATHOLOGIST: CPT | Performed by: PATHOLOGY

## 2021-08-28 PROCEDURE — 87102 FUNGUS ISOLATION CULTURE: CPT | Performed by: DENTIST

## 2021-08-28 PROCEDURE — 80048 BASIC METABOLIC PNL TOTAL CA: CPT | Performed by: INTERNAL MEDICINE

## 2021-08-28 PROCEDURE — 87181 SC STD AGAR DILUTION PER AGT: CPT | Performed by: DENTIST

## 2021-08-28 PROCEDURE — 88307 TISSUE EXAM BY PATHOLOGIST: CPT | Performed by: PATHOLOGY

## 2021-08-28 PROCEDURE — 88311 DECALCIFY TISSUE: CPT | Performed by: PATHOLOGY

## 2021-08-28 PROCEDURE — 87075 CULTR BACTERIA EXCEPT BLOOD: CPT | Performed by: DENTIST

## 2021-08-28 PROCEDURE — 87205 SMEAR GRAM STAIN: CPT | Performed by: DENTIST

## 2021-08-28 PROCEDURE — 87106 FUNGI IDENTIFICATION YEAST: CPT | Performed by: DENTIST

## 2021-08-28 PROCEDURE — 0W930ZZ DRAINAGE OF ORAL CAVITY AND THROAT, OPEN APPROACH: ICD-10-PCS | Performed by: DENTIST

## 2021-08-28 PROCEDURE — 87116 MYCOBACTERIA CULTURE: CPT | Performed by: DENTIST

## 2021-08-28 PROCEDURE — 85025 COMPLETE CBC W/AUTO DIFF WBC: CPT | Performed by: INTERNAL MEDICINE

## 2021-08-28 PROCEDURE — 83735 ASSAY OF MAGNESIUM: CPT | Performed by: INTERNAL MEDICINE

## 2021-08-28 PROCEDURE — 0NBT0ZZ EXCISION OF RIGHT MANDIBLE, OPEN APPROACH: ICD-10-PCS | Performed by: DENTIST

## 2021-08-28 PROCEDURE — 87206 SMEAR FLUORESCENT/ACID STAI: CPT | Performed by: DENTIST

## 2021-08-28 PROCEDURE — 87070 CULTURE OTHR SPECIMN AEROBIC: CPT | Performed by: DENTIST

## 2021-08-28 PROCEDURE — 99232 SBSQ HOSP IP/OBS MODERATE 35: CPT | Performed by: NURSE PRACTITIONER

## 2021-08-28 PROCEDURE — 0CTX0Z1 RESECTION OF LOWER TOOTH, MULTIPLE, OPEN APPROACH: ICD-10-PCS | Performed by: DENTIST

## 2021-08-28 PROCEDURE — 87077 CULTURE AEROBIC IDENTIFY: CPT | Performed by: DENTIST

## 2021-08-28 RX ORDER — DEXAMETHASONE SODIUM PHOSPHATE 4 MG/ML
INJECTION, SOLUTION INTRA-ARTICULAR; INTRALESIONAL; INTRAMUSCULAR; INTRAVENOUS; SOFT TISSUE AS NEEDED
Status: DISCONTINUED | OUTPATIENT
Start: 2021-08-28 | End: 2021-08-28

## 2021-08-28 RX ORDER — SUCCINYLCHOLINE/SOD CL,ISO/PF 100 MG/5ML
SYRINGE (ML) INTRAVENOUS AS NEEDED
Status: DISCONTINUED | OUTPATIENT
Start: 2021-08-28 | End: 2021-08-28

## 2021-08-28 RX ORDER — ALBUTEROL SULFATE 2.5 MG/3ML
2.5 SOLUTION RESPIRATORY (INHALATION) ONCE AS NEEDED
Status: DISCONTINUED | OUTPATIENT
Start: 2021-08-28 | End: 2021-08-28 | Stop reason: HOSPADM

## 2021-08-28 RX ORDER — ONDANSETRON 2 MG/ML
INJECTION INTRAMUSCULAR; INTRAVENOUS AS NEEDED
Status: DISCONTINUED | OUTPATIENT
Start: 2021-08-28 | End: 2021-08-28

## 2021-08-28 RX ORDER — LIDOCAINE HYDROCHLORIDE AND EPINEPHRINE 10; 10 MG/ML; UG/ML
INJECTION, SOLUTION INFILTRATION; PERINEURAL AS NEEDED
Status: DISCONTINUED | OUTPATIENT
Start: 2021-08-28 | End: 2021-08-28 | Stop reason: HOSPADM

## 2021-08-28 RX ORDER — LIDOCAINE HYDROCHLORIDE 10 MG/ML
INJECTION, SOLUTION EPIDURAL; INFILTRATION; INTRACAUDAL; PERINEURAL AS NEEDED
Status: DISCONTINUED | OUTPATIENT
Start: 2021-08-28 | End: 2021-08-28

## 2021-08-28 RX ORDER — EPHEDRINE SULFATE 50 MG/ML
INJECTION INTRAVENOUS AS NEEDED
Status: DISCONTINUED | OUTPATIENT
Start: 2021-08-28 | End: 2021-08-28

## 2021-08-28 RX ORDER — PROPOFOL 10 MG/ML
INJECTION, EMULSION INTRAVENOUS AS NEEDED
Status: DISCONTINUED | OUTPATIENT
Start: 2021-08-28 | End: 2021-08-28

## 2021-08-28 RX ORDER — PROMETHAZINE HYDROCHLORIDE 25 MG/ML
12.5 INJECTION, SOLUTION INTRAMUSCULAR; INTRAVENOUS ONCE AS NEEDED
Status: DISCONTINUED | OUTPATIENT
Start: 2021-08-28 | End: 2021-08-28 | Stop reason: HOSPADM

## 2021-08-28 RX ORDER — CHLORHEXIDINE GLUCONATE 0.12 MG/ML
RINSE ORAL AS NEEDED
Status: DISCONTINUED | OUTPATIENT
Start: 2021-08-28 | End: 2021-08-28 | Stop reason: HOSPADM

## 2021-08-28 RX ORDER — SODIUM CHLORIDE, SODIUM LACTATE, POTASSIUM CHLORIDE, CALCIUM CHLORIDE 600; 310; 30; 20 MG/100ML; MG/100ML; MG/100ML; MG/100ML
125 INJECTION, SOLUTION INTRAVENOUS CONTINUOUS
Status: DISCONTINUED | OUTPATIENT
Start: 2021-08-28 | End: 2021-08-29

## 2021-08-28 RX ORDER — FENTANYL CITRATE/PF 50 MCG/ML
25 SYRINGE (ML) INJECTION
Status: DISCONTINUED | OUTPATIENT
Start: 2021-08-28 | End: 2021-08-28 | Stop reason: HOSPADM

## 2021-08-28 RX ORDER — FENTANYL CITRATE 50 UG/ML
INJECTION, SOLUTION INTRAMUSCULAR; INTRAVENOUS AS NEEDED
Status: DISCONTINUED | OUTPATIENT
Start: 2021-08-28 | End: 2021-08-28

## 2021-08-28 RX ORDER — MEPERIDINE HYDROCHLORIDE 25 MG/ML
12.5 INJECTION INTRAMUSCULAR; INTRAVENOUS; SUBCUTANEOUS ONCE
Status: DISCONTINUED | OUTPATIENT
Start: 2021-08-28 | End: 2021-08-28 | Stop reason: HOSPADM

## 2021-08-28 RX ORDER — BUPIVACAINE HYDROCHLORIDE AND EPINEPHRINE 5; 5 MG/ML; UG/ML
INJECTION, SOLUTION PERINEURAL AS NEEDED
Status: DISCONTINUED | OUTPATIENT
Start: 2021-08-28 | End: 2021-08-28 | Stop reason: HOSPADM

## 2021-08-28 RX ORDER — LABETALOL 20 MG/4 ML (5 MG/ML) INTRAVENOUS SYRINGE
5
Status: DISCONTINUED | OUTPATIENT
Start: 2021-08-28 | End: 2021-08-28 | Stop reason: HOSPADM

## 2021-08-28 RX ORDER — ONDANSETRON 2 MG/ML
4 INJECTION INTRAMUSCULAR; INTRAVENOUS ONCE AS NEEDED
Status: DISCONTINUED | OUTPATIENT
Start: 2021-08-28 | End: 2021-08-28 | Stop reason: HOSPADM

## 2021-08-28 RX ORDER — MIDAZOLAM HYDROCHLORIDE 2 MG/2ML
INJECTION, SOLUTION INTRAMUSCULAR; INTRAVENOUS AS NEEDED
Status: DISCONTINUED | OUTPATIENT
Start: 2021-08-28 | End: 2021-08-28

## 2021-08-28 RX ORDER — HYDROMORPHONE HCL/PF 1 MG/ML
0.5 SYRINGE (ML) INJECTION
Status: DISCONTINUED | OUTPATIENT
Start: 2021-08-28 | End: 2021-08-28 | Stop reason: HOSPADM

## 2021-08-28 RX ADMIN — PROPOFOL 150 MG: 10 INJECTION, EMULSION INTRAVENOUS at 09:43

## 2021-08-28 RX ADMIN — EPHEDRINE SULFATE 10 MG: 50 INJECTION, SOLUTION INTRAVENOUS at 09:58

## 2021-08-28 RX ADMIN — Medication 100 MG: at 09:43

## 2021-08-28 RX ADMIN — OXYCODONE HYDROCHLORIDE 5 MG: 5 TABLET ORAL at 01:18

## 2021-08-28 RX ADMIN — OXYCODONE HYDROCHLORIDE 5 MG: 5 TABLET ORAL at 05:22

## 2021-08-28 RX ADMIN — KETOROLAC TROMETHAMINE 15 MG: 30 INJECTION, SOLUTION INTRAMUSCULAR; INTRAVENOUS at 03:52

## 2021-08-28 RX ADMIN — KETOROLAC TROMETHAMINE 15 MG: 30 INJECTION, SOLUTION INTRAMUSCULAR; INTRAVENOUS at 19:54

## 2021-08-28 RX ADMIN — LORAZEPAM 2 MG: 1 TABLET ORAL at 21:27

## 2021-08-28 RX ADMIN — EPHEDRINE SULFATE 10 MG: 50 INJECTION, SOLUTION INTRAVENOUS at 09:53

## 2021-08-28 RX ADMIN — MIDAZOLAM 2 MG: 1 INJECTION INTRAMUSCULAR; INTRAVENOUS at 09:37

## 2021-08-28 RX ADMIN — DEXAMETHASONE SODIUM PHOSPHATE 8 MG: 4 INJECTION INTRA-ARTICULAR; INTRALESIONAL; INTRAMUSCULAR; INTRAVENOUS; SOFT TISSUE at 09:50

## 2021-08-28 RX ADMIN — KETOROLAC TROMETHAMINE 15 MG: 30 INJECTION, SOLUTION INTRAMUSCULAR; INTRAVENOUS at 13:52

## 2021-08-28 RX ADMIN — LEVOTHYROXINE SODIUM 100 MCG: 100 TABLET ORAL at 08:53

## 2021-08-28 RX ADMIN — ACETAMINOPHEN 975 MG: 325 TABLET, FILM COATED ORAL at 05:00

## 2021-08-28 RX ADMIN — FLUOXETINE 40 MG: 20 CAPSULE ORAL at 08:53

## 2021-08-28 RX ADMIN — SODIUM CHLORIDE 75 ML/HR: 0.9 INJECTION, SOLUTION INTRAVENOUS at 19:37

## 2021-08-28 RX ADMIN — SODIUM CHLORIDE 3 G: 9 INJECTION, SOLUTION INTRAVENOUS at 19:39

## 2021-08-28 RX ADMIN — SODIUM CHLORIDE 3 G: 9 INJECTION, SOLUTION INTRAVENOUS at 01:15

## 2021-08-28 RX ADMIN — LISINOPRIL 20 MG: 20 TABLET ORAL at 08:53

## 2021-08-28 RX ADMIN — BUSPIRONE HYDROCHLORIDE 5 MG: 5 TABLET ORAL at 08:53

## 2021-08-28 RX ADMIN — SODIUM CHLORIDE 3 G: 9 INJECTION, SOLUTION INTRAVENOUS at 07:26

## 2021-08-28 RX ADMIN — FENTANYL CITRATE 100 MCG: 50 INJECTION INTRAMUSCULAR; INTRAVENOUS at 09:43

## 2021-08-28 RX ADMIN — MORPHINE SULFATE 2 MG: 2 INJECTION, SOLUTION INTRAMUSCULAR; INTRAVENOUS at 16:51

## 2021-08-28 RX ADMIN — LIDOCAINE HYDROCHLORIDE 50 MG: 10 INJECTION, SOLUTION EPIDURAL; INFILTRATION; INTRACAUDAL; PERINEURAL at 09:43

## 2021-08-28 RX ADMIN — MORPHINE SULFATE 2 MG: 2 INJECTION, SOLUTION INTRAMUSCULAR; INTRAVENOUS at 02:44

## 2021-08-28 RX ADMIN — SODIUM CHLORIDE 75 ML/HR: 0.9 INJECTION, SOLUTION INTRAVENOUS at 08:48

## 2021-08-28 RX ADMIN — ONDANSETRON 4 MG: 2 INJECTION INTRAMUSCULAR; INTRAVENOUS at 09:50

## 2021-08-28 NOTE — ASSESSMENT & PLAN NOTE
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

## 2021-08-28 NOTE — OP NOTE
OPERATIVE REPORT  PATIENT NAME: Hilton Nichols    :  1950  MRN: [id number]  Pt Location: BE OR ROOM 05    SURGERY DATE: 2021    Surgeon(s) and Role:     * Wai Mooney DMD - Primary    Preop Diagnosis:  Dental abscess [K04 7]    Post-Op Diagnosis Codes:     * Dental abscess [K04 7]    Procedure(s) (LRB):  EXTRACTION TEETH 25,26,27 (Right)  INCISION AND DRAINAGE (I&D) HEAD/FACE (Right)   Soft tissue and mandibular bone biopsy    Specimen(s):  ID Type Source Tests Collected by Time Destination   1 : soft tissue mandible Tissue Jaw TISSUE EXAM Wai Mooney DMD 2021 1006    2 : bone mandible Tissue Jaw TISSUE EXAM Wai Mooney DMD 2021 1006    A : right mandible Tissue Wound ANAEROBIC CULTURE AND GRAM STAIN, FUNGAL CULTURE, CULTURE, TISSUE AND GRAM STAIN, WOUND CULTURE, AFB CULTURE WITH STAIN Wai Mooney DMD 2021 8553        Estimated Blood Loss:   Minimal    Drains:  * No LDAs found *    Anesthesia Type:   General    Operative Indications:  Dental abscess [K367]  36-year-old female with 2 week history of increasing right mandibular swelling and jaw pain  Patient with fluctuant mandibular swelling and radiographic evidence of mandibular cortical destruction  Opertive Findings:  Gross purulent drainage established    Complications:   None    Procedure and Technique:  The patient was identified in the preoperative holding area  The consent was reviewed  The patient was taken to the operating room and was placed supine on the operating room table  General anesthesia was induced and the patient was intubated via an oral endotracheal tube  A pharyngeal packing was placed  The mouth was rinsed with chlorhexidine solution  A 10 cc syringe with a 22 gauge needle was used to aspirate the mandibular vestibular abscess  Purulent drainage was obtained  This was sent for culture and sensitivity  Local anesthesia was administered intraorally    A 15 blade was used to make an incision through mucosa over the felix of fluctuance in the right mandibular anterior vestibule  Blunt dissection was performed into the right mandibular vestibular space and copious amounts of purulent drainage was established  The area was explored and a large bony defect was present  There did not appear to be any cystic lining or any type of mass present  Some soft tissue which appeared to be mentalis muscle was sent for histologic review  A rongeur was used to remove some bone at the periphery of the defect and this was sent as specimen as well  The site was irrigated with copious amounts of saline solution  The root apices of teeth #25,26, and 27 were visualized within the bony defect  These teeth had clinical mobility  A full-thickness sulcular flap was made from the mesial of tooth #25 to the distal of tooth #27  Teeth #25,26,27 were extracted surgically without complication  Granulation tissue was removed  The sites were cleaned and irrigated  The flap was replaced and sutured with multiple 3-0 chromic gut sutures  The pharyngeal packing was removed  The patient remained stable throughout the procedure  There were no complications     I was present for the entire procedure    Patient Disposition:  PACU     SIGNATURE: Anton Zamora DMD  DATE: August 28, 2021  TIME: 10:23 AM

## 2021-08-28 NOTE — PLAN OF CARE
Problem: INFECTION - ADULT  Goal: Absence or prevention of progression during hospitalization  Description: INTERVENTIONS:  - Assess and monitor for signs and symptoms of infection  - Monitor lab/diagnostic results  - Monitor all insertion sites, i e  indwelling lines, tubes, and drains  - Monitor endotracheal if appropriate and nasal secretions for changes in amount and color  - [place] appropriate cooling/warming therapies per order  - Administer medications as ordered  - Instruct and encourage patient and family to use good hand hygiene technique  - Identify and instruct in appropriate isolation precautions for identified infection/condition  8/28/2021 0734 by Dianna Hankins RN  Outcome: Progressing  8/28/2021 0734 by Dianna Hankins RN  Outcome: Progressing  Goal: Absence of fever/infection during neutropenic period  Description: INTERVENTIONS:  - Monitor WBC    8/28/2021 0734 by Dianna Hankins RN  Outcome: Progressing  8/28/2021 0734 by Dianna Hankins RN  Outcome: Progressing     Problem: Nutrition/Hydration-ADULT  Goal: Nutrient/Hydration intake appropriate for improving, restoring or maintaining nutritional needs  Description: Monitor and assess patient's nutrition/hydration status for malnutrition  Collaborate with interdisciplinary team and initiate plan and interventions as ordered  Monitor patient's weight and dietary intake as ordered or per policy  Utilize nutrition screening tool and intervene as necessary  Determine patient's food preferences and provide high-protein, high-caloric foods as appropriate       INTERVENTIONS:  - Monitor oral intake, urinary output, labs, and treatment plans  - Assess nutrition and hydration status and recommend course of action  - Evaluate amount of meals eaten  - Assist patient with eating if necessary   - Allow adequate time for meals  - Recommend/ encourage appropriate diets, oral nutritional supplements, and vitamin/mineral supplements  - Order, calculate, and assess calorie counts as needed  - Recommend, monitor, and adjust tube feedings and TPN/PPN based on assessed needs  - Assess need for intravenous fluids  - Provide specific nutrition/hydration education as appropriate  - Include patient/family/caregiver in decisions related to nutrition  8/28/2021 0734 by Yin Couch RN  Outcome: Progressing  8/28/2021 0734 by Yin Couch RN  Outcome: Progressing

## 2021-08-28 NOTE — ANESTHESIA POSTPROCEDURE EVALUATION
Post-Op Assessment Note    CV Status:  Stable  Pain Score: 0    Pain management: adequate     Mental Status:  Alert and awake   Hydration Status:  Euvolemic   PONV Controlled:  Controlled   Airway Patency:  Patent      Post Op Vitals Reviewed: Yes      Staff: Anesthesiologist, CRNA         No complications documented      BP   105/70   Temp (!) (P) 97 3 °F (36 3 °C) (08/28/21 1030)    Pulse (!) 116 (08/28/21 1030)   Resp (P) 16 (08/28/21 1030)    SpO2 99 % (08/28/21 1030)

## 2021-08-28 NOTE — ASSESSMENT & PLAN NOTE
Patient presented to [facility] ED complaining of lower jaw swelling and has been gradually worsening over last 1 week  She is afebrile without leukocytosis  CT scan findings are suspicious for small subperiosteal abscess along outer cortex of right mandibular symphysis with associated focal erosive osteomyelitis and adjacent cellulitis  ·  Right mandibular lateral incisor and right maxillary canine teeth are likely odontogenic sources of infection  Difficult to exclude an underlying osseous lesion given diffuse inflammation    · Patient was transferred to [facility] for oral surgery evaluation and treatment  · Now post op day # 0 : EXTRACTION TEETH 25,26,27 (Right)INCISION AND DRAINAGE (I&D) HEAD/FACE (Right)   · Soft tissue and mandibular bone biopsy  · IV Unasyn  - cultures in process post procedure day # 0   · Dysphagia on thin liquids  · Appreciate OMFS  · Pain control  · IV fluid hydration

## 2021-08-28 NOTE — CONSULTS
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  Patient will need consultation with hyperbaric team as postoperative hyperbaric dives are likely required  Plan:  Plan for incision and drainage and extraction of multiple teeth with possible biopsy in the operating room under general anesthesia  History of Present Illness   Physician Requesting Consult: Julia Farah MD  Reason for Consult / Principal Problem:  Mandibular swelling and pain  HPI: Eliceo Fink is a 72y o  year old female who presents with a 2 week history of lower jaw swelling and pain  The patient states that over the past week it has increasingly gotten worse and more painful each day  The patient states root canal therapy was performed on her lower teeth instead of extractions due to her previous radiation treatment  Patient is status post treatment of a 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  She was declared cancer-free in 2013  Inpatient consult to Oral and Maxillofacial Surgery  Consult performed by: Ministerio Colindres DMD  Consult ordered by: Lino Foster DO          Review of Systems   Constitutional: Negative  HENT: Positive for dental problem and facial swelling  Eyes: Negative  Respiratory: Negative  Cardiovascular: Negative  Gastrointestinal: Negative  Genitourinary: Negative  Musculoskeletal: Negative  Neurological: Positive for numbness  Hematological: Negative  Psychiatric/Behavioral: Negative  All other systems reviewed and are negative        Historical Information   Past Medical History:   Diagnosis Date    Anxiety     Cancer [facility] [address] ) ~2009    Tonsils- followed by Oncology( Dr Palmira Fink, Dr Vanessa Abdalla)    Disease of thyroid gland      Past Surgical History:   Procedure Laterality Date   Dimitry Berkowitz Specking History   Social History     Substance and Sexual Activity   Alcohol Use Yes     Social History     Substance and Sexual Activity   Drug Use Not on file     E-Cigarette/Vaping     E-Cigarette/Vaping Substances    Nicotine No     THC No     CBD No     Flavoring No     Other No     Unknown No      Social History     Tobacco Use   Smoking Status Former Smoker    Years: 20 00    Types: Cigarettes   Smokeless Tobacco Never Used     Family History:   Family History   Problem Relation Age of Onset    No Known Problems Mother     No Known Problems Father     Breast cancer Sister 27    No Known Problems Daughter     No Known Problems Maternal Grandmother     No Known Problems Paternal Grandmother     No Known Problems Sister     No Known Problems Sister     No Known Problems Sister     No Known Problems Maternal Aunt     No Known Problems Maternal Aunt        Meds/Allergies   current meds:   Current Facility-Administered Medications   Medication Dose Route Frequency    acetaminophen (TYLENOL) tablet 975 mg  975 mg Oral Q8H [facility]    ampicillin-sulbactam (UNASYN) 3 g in sodium chloride 0 9 % 100 mL IVPB  3 g Intravenous Q6H    busPIRone (BUSPAR) tablet 5 mg  5 mg Oral BID    enoxaparin (LOVENOX) subcutaneous injection 40 mg  40 mg Subcutaneous Daily    FLUoxetine (PROzac) capsule 40 mg  40 mg Oral Daily    ketorolac (TORADOL) injection 15 mg  15 mg Intravenous Q6H PRN    levothyroxine tablet 100 mcg  100 mcg Oral Daily    lisinopril (ZESTRIL) tablet 20 mg  20 mg Oral Daily    LORazepam (ATIVAN) tablet 2 mg 2 mg Oral BID PRN    morphine injection 2 mg  2 mg Intravenous Q4H PRN    ondansetron (ZOFRAN) injection 4 mg  4 mg Intravenous Q6H PRN    oxyCODONE (ROXICODONE) IR tablet 5 mg  5 mg Oral Q4H PRN    sodium chloride 0 9 % infusion  75 mL/hr Intravenous Continuous       No Known Allergies    Objective       Intake/Output Summary (Last 24 hours) at 8/28/2021 0906  Last data filed at 8/28/2021 0301  Gross per 24 hour   Intake 671 25 ml   Output --   Net 671 25 ml       Invasive Devices     Peripheral Intravenous Line            Peripheral IV 08/27/21 Right Antecubital <1 day                Physical Exam  Constitutional:       Appearance: Normal appearance  HENT:      Head:      Jaw: Tenderness present  Comments: Right mandibular anterior fluctuant swelling  Inferior border of mandible palpable bilaterally  No trismus  Floor of mouth soft  Pharynx within normal limits  Fluctuant swelling in the right mandibular anterior vestibule  Nose: Nose normal    Eyes:      Extraocular Movements: Extraocular movements intact  Conjunctiva/sclera: Conjunctivae normal    Cardiovascular:      Rate and Rhythm: Normal rate and regular rhythm  Heart sounds: Normal heart sounds  Pulmonary:      Breath sounds: Normal breath sounds  Abdominal:      Palpations: Abdomen is soft  Skin:     General: Skin is warm and dry  Neurological:      Mental Status: She is alert and oriented to person, place, and time  Cranial Nerves: Cranial nerve deficit present  Comments: Right cranial nerve V3 hypoesthesia     Psychiatric:         Mood and Affect: Mood normal          Behavior: Behavior normal          Lab Results:   CBC:   Lab Results   Component Value Date    WBC 5 31 08/28/2021    HGB 12 9 08/28/2021    HCT 39 4 08/28/2021     (H) 08/28/2021     08/28/2021    MCH 32 7 08/28/2021    MCHC 32 7 08/28/2021    RDW 11 7 08/28/2021    MPV 10 1 08/28/2021    NRBC 0 08/28/2021     Imaging Studies: I have personally reviewed pertinent films in PACS  There is osseous erosion along the right mandibular parasymphysis region with buccal cortex destruction  There is some sclerosis which may indicate existing osteoradionecrosis  Periapical radiolucencies at teeth #25,26, and 27  Multiple teeth status post root canal therapy  EKG, Pathology, and Other Studies: I have personally reviewed pertinent reports  Code Status: Level 1 - Full Code  Advance Directive and Living Will:      Power of :    POLST:      Counseling/Coordination of Care: Total floor / unit time spent today 30 minutes  Greater than 50% of total time was spent with the patient and / or family counseling and / or coordination of care  A description of the counseling / coordination of care: I reviewed the chart  I reviewed the radiographs  I examined the patient  I discussed her current condition and need for surgical treatment  I discussed risks and complications  I obtained informed consent

## 2021-08-28 NOTE — PROGRESS NOTES
[facility]  Progress Note Avelina Cowden 1950, 72 y o  female MRN: [id number]  Unit/Bed#: -Horacio Encounter: [id number]  Primary Care Provider: MARY Barrera   Date and time admitted to hospital: 8/27/2021  6:34 PM    * Dental abscess  Assessment & Plan  Patient presented to [facility] ED complaining of lower jaw swelling and has been gradually worsening over last 1 week  She is afebrile without leukocytosis  CT scan findings are suspicious for small subperiosteal abscess along outer cortex of right mandibular symphysis with associated focal erosive osteomyelitis and adjacent cellulitis  ·  Right mandibular lateral incisor and right maxillary canine teeth are likely odontogenic sources of infection  Difficult to exclude an underlying osseous lesion given diffuse inflammation  · Patient was transferred to [facility] for oral surgery evaluation and treatment  · Now post op day # 0 : EXTRACTION TEETH 25,26,27 (Right)INCISION AND DRAINAGE (I&D) HEAD/FACE (Right)   · Soft tissue and mandibular bone biopsy  · IV Unasyn  - cultures in process post procedure day # 0   · Dysphagia on thin liquids  · Appreciate OMFS  · Pain control  · IV fluid hydration    Essential hypertension  Assessment & Plan  · Continue lisinopril    Acquired hypothyroidism  Assessment & Plan  · Continue levothyroxine    Depression with anxiety  Assessment & Plan  · Continue Prozac and Buspar  · P r n  Lorazepam    Malignant neoplasm of tonsil [facility])  Assessment & Plan  · 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 treated with chemoradiation completed in 2008 evidence of recurrence and declared cancer free in 2013          VTE Pharmacologic Prophylaxis: VTE Score: 6 High Risk (Score >/= 5) - Pharmacological DVT Prophylaxis Ordered: enoxaparin (Lovenox)  Sequential Compression Devices Ordered      Patient Centered Rounds: I performed bedside rounds with nursing staff today  Discussions with Specialists or Other Care Team Provider: nursing     Education and Discussions with Family / Patient: Updated  () at bedside  Time Spent for Care: 45 minutes  More than 50% of total time spent on counseling and coordination of care as described above  Current Length of Stay: 1 day(s)  Current Patient Status: Inpatient   Certification Statement: The patient will continue to require additional inpatient hospital stay due to post op day # 0 pending cultures   Discharge Plan: Anticipate discharge in 24-48 hrs to home  Code Status: Level 1 - Full Code    Subjective:   Pt is having some pain  She has not had anything to eat yet and is sipping little bit of water  she has some slight blood drainage and is able to place gauze which is helping her     Objective:     Vitals:   Temp (24hrs), Av 6 °F (36 4 °C), Min:97 2 °F (36 2 °C), Max:97 9 °F (36 6 °C)    Temp:  [97 2 °F (36 2 °C)-97 9 °F (36 6 °C)] 97 9 °F (36 6 °C)  HR:  [] 87  Resp:  [14-20] 18  BP: (105-146)/(66-88) 137/80  SpO2:  [91 %-99 %] 93 %  Body mass index is 24 39 kg/m²  Input and Output Summary (last 24 hours): Intake/Output Summary (Last 24 hours) at 2021 1651  Last data filed at 2021 1020  Gross per 24 hour   Intake 1471 25 ml   Output --   Net 1471 25 ml       Physical Exam:   Physical Exam  Constitutional:       General: She is not in acute distress  Appearance: She is not ill-appearing, toxic-appearing or diaphoretic  HENT:      Head:      Comments: Tenderness jaw bloody drainage from surgery area right lower jaw  Eyes:      Conjunctiva/sclera: Conjunctivae normal    Cardiovascular:      Rate and Rhythm: Normal rate  Heart sounds: No murmur heard  No friction rub  No gallop  Pulmonary:      Effort: No respiratory distress  Breath sounds: No wheezing, rhonchi or rales  Chest:      Chest wall: No tenderness     Abdominal: General: There is no distension  Palpations: There is no mass  Tenderness: There is no abdominal tenderness  There is no right CVA tenderness, left CVA tenderness, guarding or rebound  Hernia: No hernia is present  Musculoskeletal:         General: No swelling, tenderness, deformity or signs of injury  Right lower leg: No edema  Left lower leg: No edema  Skin:     Coloration: Skin is not jaundiced or pale  Findings: No bruising, erythema, lesion or rash  Neurological:      Mental Status: She is alert and oriented to person, place, and time  Psychiatric:         Behavior: Behavior normal           Additional Data:     Labs:  Results from last 7 days   Lab Units 08/28/21  0510   WBC Thousand/uL 5 31   HEMOGLOBIN g/dL 12 9   HEMATOCRIT % 39 4   PLATELETS Thousands/uL 197   NEUTROS PCT % 74   LYMPHS PCT % 13*   MONOS PCT % 9   EOS PCT % 3     Results from last 7 days   Lab Units 08/28/21  0510   SODIUM mmol/L 137   POTASSIUM mmol/L 4 3   CHLORIDE mmol/L 107   CO2 mmol/L 29   BUN mg/dL 15   CREATININE mg/dL 0 84   ANION GAP mmol/L 1*   CALCIUM mg/dL 8 6   GLUCOSE RANDOM mg/dL 93                 Results from last 7 days   Lab Units 08/27/21  1527   PROCALCITONIN ng/ml <0 05       Lines/Drains:  Invasive Devices     Peripheral Intravenous Line            Peripheral IV 08/27/21 Right Antecubital 1 day                      Imaging: Reviewed radiology reports from this admission including: ct scan     Recent Cultures (last 7 days):   Results from last 7 days   Lab Units 08/27/21  1527   BLOOD CULTURE  Received in Microbiology Lab  Culture in Progress  Received in Microbiology Lab  Culture in Progress         Last 24 Hours Medication List:   Current Facility-Administered Medications   Medication Dose Route Frequency Provider Last Rate    acetaminophen  975 mg Oral Q8H [address], DMD      ampicillin-sulbactam  3 g Intravenous Q6H Jeferson Dunn, DMD 3 g (08/28/21 0788)  busPIRone  5 mg Oral BID [facility], D[facility]      enoxaparin  40 [facility] Subcutaneous Daily [facility]      FLUoxetine  40 mg Oral Daily [facility], DMD      ketorolac  15 mg Intravenous Q6H PRN [facility], DMD      lactated ringers  125 mL/hr Intravenous Continuous [facility], DMD      levothyroxine  100 mcg Oral Daily [facility], DMD      lisinopril  20 mg Oral Daily [facility], DMD      LORazepam  2 mg Oral BID PRN [facility], DMD      morphine injection  2 mg Intravenous Q4H PRN [facility], DMD      ondansetron  4 mg Intravenous Q6H PRN [facility], DMD      oxyCODONE  5 mg Oral Q4H PRN [facility], DMD      sodium chloride  75 mL/hr Intravenous Continuous [facility], DMD 75 mL/hr (08/28/21 1030)        Today, Patient Was Seen By: MARY Us    **Please Note: This note may have been constructed using a voice recognition system  **

## 2021-08-28 NOTE — PLAN OF CARE
Problem: INFECTION - ADULT  Goal: Absence or prevention of progression during hospitalization  Description: INTERVENTIONS:  - Assess and monitor for signs and symptoms of infection  - Monitor lab/diagnostic results  - Monitor all insertion sites, i e  indwelling lines, tubes, and drains  - Monitor endotracheal if appropriate and nasal secretions for changes in amount and color  - [place] appropriate cooling/warming therapies per order  - Administer medications as ordered  - Instruct and encourage patient and family to use good hand hygiene technique  - Identify and instruct in appropriate isolation precautions for identified infection/condition  Outcome: Progressing  Goal: Absence of fever/infection during neutropenic period  Description: INTERVENTIONS:  - Monitor WBC    Outcome: Progressing Event Note/discharge

## 2021-08-28 NOTE — ANESTHESIA PREPROCEDURE EVALUATION
Patient seen and examined  History reviewed  Patient to be done under general anesthesia with ETT and routine monitors  Risks discussed with the patient  Consent obtained  Procedure:  EXTRACTION TEETH MULTIPLE (Right Mouth)  INCISION AND DRAINAGE (I&D) HEAD/FACE (Right Face)    Relevant Problems   CARDIO   (+) Essential hypertension      ENDO   (+) Acquired hypothyroidism      NEURO/PSYCH   (+) Depression with anxiety   (+) Depression, recurrent (HCC)      Other   (+) Malignant neoplasm of tonsil (HCC)        Physical Exam    Airway    Mallampati score: I  TM Distance: >3 FB  Neck ROM: limited     Dental       Cardiovascular      Pulmonary      Other Findings        Anesthesia Plan  ASA Score- 3 Emergent    Anesthesia Type- general with ASA Monitors  Additional Monitors:   Airway Plan: ETT  Comment: Patient seen and examined  History reviewed  Patient to be done under general anesthesia with ETT and routine monitors  Risks discussed with the patient  Consent obtained          Plan Factors-Exercise tolerance (METS): >4 METS  Chart reviewed  Patient summary reviewed  Induction- intravenous  Postoperative Plan-     Informed Consent- Anesthetic plan and risks discussed with patient  I personally reviewed this patient with the CRNA  Discussed and agreed on the Anesthesia Plan with the CRNA  Carine Kunz

## 2021-08-29 PROCEDURE — 99232 SBSQ HOSP IP/OBS MODERATE 35: CPT | Performed by: NURSE PRACTITIONER

## 2021-08-29 RX ORDER — IBUPROFEN 400 MG/1
400 TABLET ORAL EVERY 6 HOURS PRN
Status: DISCONTINUED | OUTPATIENT
Start: 2021-08-29 | End: 2021-08-30 | Stop reason: HOSPADM

## 2021-08-29 RX ORDER — CHLORHEXIDINE GLUCONATE 0.12 MG/ML
15 RINSE ORAL EVERY 12 HOURS SCHEDULED
Status: DISCONTINUED | OUTPATIENT
Start: 2021-08-29 | End: 2021-08-30 | Stop reason: HOSPADM

## 2021-08-29 RX ADMIN — LORAZEPAM 2 MG: 1 TABLET ORAL at 21:26

## 2021-08-29 RX ADMIN — LISINOPRIL 20 MG: 20 TABLET ORAL at 08:01

## 2021-08-29 RX ADMIN — CHLORHEXIDINE GLUCONATE 0.12% ORAL RINSE 15 ML: 1.2 LIQUID ORAL at 18:36

## 2021-08-29 RX ADMIN — ENOXAPARIN SODIUM 40 MG: 40 INJECTION SUBCUTANEOUS at 08:02

## 2021-08-29 RX ADMIN — LEVOTHYROXINE SODIUM 100 MCG: 100 TABLET ORAL at 08:01

## 2021-08-29 RX ADMIN — FLUOXETINE 40 MG: 20 CAPSULE ORAL at 08:01

## 2021-08-29 RX ADMIN — SODIUM CHLORIDE 3 G: 9 INJECTION, SOLUTION INTRAVENOUS at 13:14

## 2021-08-29 RX ADMIN — KETOROLAC TROMETHAMINE 15 MG: 30 INJECTION, SOLUTION INTRAMUSCULAR; INTRAVENOUS at 18:35

## 2021-08-29 RX ADMIN — SODIUM CHLORIDE 3 G: 9 INJECTION, SOLUTION INTRAVENOUS at 06:26

## 2021-08-29 RX ADMIN — SODIUM CHLORIDE 3 G: 9 INJECTION, SOLUTION INTRAVENOUS at 01:34

## 2021-08-29 RX ADMIN — ACETAMINOPHEN 975 MG: 325 TABLET, FILM COATED ORAL at 21:26

## 2021-08-29 RX ADMIN — BUSPIRONE HYDROCHLORIDE 5 MG: 5 TABLET ORAL at 08:01

## 2021-08-29 RX ADMIN — BUSPIRONE HYDROCHLORIDE 5 MG: 5 TABLET ORAL at 17:06

## 2021-08-29 RX ADMIN — SODIUM CHLORIDE 3 G: 9 INJECTION, SOLUTION INTRAVENOUS at 19:34

## 2021-08-29 RX ADMIN — ACETAMINOPHEN 975 MG: 325 TABLET, FILM COATED ORAL at 13:13

## 2021-08-29 RX ADMIN — KETOROLAC TROMETHAMINE 15 MG: 30 INJECTION, SOLUTION INTRAMUSCULAR; INTRAVENOUS at 07:49

## 2021-08-29 NOTE — ASSESSMENT & PLAN NOTE
Patient presented to [address] [place] [address] ED complaining of lower jaw swelling and has been gradually worsening over last 1 week  She is afebrile without leukocytosis  CT scan findings are suspicious for small subperiosteal abscess along outer cortex of right mandibular symphysis with associated focal erosive osteomyelitis and adjacent cellulitis  ·  Right mandibular lateral incisor and right maxillary canine teeth are likely odontogenic sources of infection  Difficult to exclude an underlying osseous lesion given diffuse inflammation    · Patient was transferred to [facility] for oral surgery evaluation and treatment  · Now post op day # 1 : EXTRACTION TEETH 25,26,27 (Right)INCISION AND DRAINAGE (I&D) HEAD/FACE (Right)   · Soft tissue and mandibular bone biopsy  · IV Unasyn  - cultures in process post procedure day # 1 - so far no growth   · - per omfs will discharge tomorrow  On amoxicillin x 14 days and po flagyl x 7 days   · - will need to talk with cm and wound care tomorrow to set up HBO therapy in setting of pts prior H &N xrt hx and risk for chronic osteomyelitis   · Ice to face   · Started on peridex mouth rinse bid discharge on 14 day course   · As needed ibuprofen q 6 hrs prn for pain   · Dysphagia on thin liquids  · Appreciate OMFS  · Pain control  · IV fluid hydration

## 2021-08-29 NOTE — UTILIZATION REVIEW
Initial Clinical Review    Admission: Date/Time/Statement:   Admission Orders (From admission, onward)     Ordered        08/27/21 1840  Inpatient Admission  Once                   Orders Placed This Encounter   Procedures    Inpatient Admission     Standing Status:   Standing     Number of Occurrences:   1     Order Specific Question:   Level of Care     Answer:   Med Surg [16]     Order Specific Question:   Estimated length of stay     Answer:   More than 2 Midnights     Order Specific Question:   Certification     Answer:   I certify that inpatient services are medically necessary for this patient for a duration of greater than two midnights  See H&P and MD Progress Notes for additional information about the patient's course of treatment  Initial Presentation:  73 y/o female presents to [facility] as a transfer from [facility] ED where pt initially presented with c/o lower jaw swelling which has been gradually worsening over last wk  PMHx of squamous cell carcinoma of tonsil s/p chemo and radiation,HTN, hypothyroidism, depression and anxiety  CT findings  are suspicious for small subperiosteal abscess along outer cortex of right mandibular symphysis with associated focal erosive osteomyelitis and adjacent cellulitis   Right mandibular lateral incisor and right maxillary canine teeth are likely odontogenic sources of infection   Difficult to exclude an underlying osseous lesion given diffuse inflammation  Transferred to [facility] for OMFS eval and further tx  Admit inpatient to M/S unit -- start IV Unasyn, pain control IVF's  Npo  Continue previous po meds  Consult OMFS with plan for OR      OPERATIVE REPORT  SURGERY DATE: 8/28/2021  Procedure(s) (LRB):  EXTRACTION TEETH 25,26,27 (Right)  INCISION AND DRAINAGE (I&D) HEAD/FACE (Right)   Soft tissue and mandibular bone biopsy   Anesthesia Type:   General   Opertive Findings:  Gross purulent drainage established      Date: 8/29   Day 2:  Pain states pain improved from yesterday, feels mouth is swollen, able to eat  Continue IV unasyn  Convert to po amoxicillin x14 days and po flagyl x7 days on discharge  Recommend consult with appropriate f/u for HBO therapy as outpatient given hx of H&N XRT, risk of chronic osteomyelitis  F/u cultures, bone biopsy  Reg diet vs dysphagia as william  OOB  Lovenox/SCD's  Ice to face prn for 1st 24 hrs  Can start peridex mouth rinse BID and discharge on 14 day course   Pain prn, recommend ibuprofen 400mg q6h prn pain, Roxicodone 5mg q6h for severe pain    ED Triage Vitals   Temperature Pulse Respirations Blood Pressure SpO2   08/27/21 1843 08/27/21 1843 08/27/21 1843 08/27/21 1843 08/27/21 1843   97 7 °F (36 5 °C) 63 18 146/71 95 %      Temp Source Heart Rate Source Patient Position - Orthostatic VS BP Location FiO2 (%)   08/28/21 1030 -- -- -- --   Temporal          Pain Score       08/27/21 1844       3          Wt Readings from Last 1 Encounters:   08/27/21 68 5 kg (151 lb 1 6 oz)     Additional Vital Signs:   Date/Time  Temp  Pulse  Resp  BP  MAP (mmHg)  SpO2  O2 Flow Rate (L/min)  O2 Device   08/28/21 22:40:10  97 6 °F (36 4 °C)  91  17  132/79  97  94 %  --  --   08/28/21 1905  --  --  --  --  --  --  --  None (Room air)   08/28/21 19:03:37  97 8 °F (36 6 °C)  88  18  133/80  98  92 %  --  --   08/28/21 19:02:48  97 8 °F (36 6 °C)  --  18  133/80  98  --  --  --   08/28/21 16:37:57  97 9 °F (36 6 °C)  87  18  137/80  99  93 %  --  --   08/28/21 15:36:36  97 6 °F (36 4 °C)  92  18  132/81  98  92 %  --  --   08/28/21 14:28:28  97 6 °F (36 4 °C)  92  14  129/83  98  94 %  --  --   08/28/21 13:31:25  --  92  14  127/83  98  91 %  --  --   08/28/21 1058  97 5 °F (36 4 °C)  96  16  136/66  --  96 %  --  None (Room air)   08/28/21 1045  --  98  18  136/66  --  93 %  --  None (Room air)   08/28/21 1030  97 3 °F (36 3 °C)Abnormal   116Abnormal   16  105/70  --  99 %  6 L/min  Simple mask   08/28/21 08:51:30  --  83  --  139/88  105  94 %  -- --   08/28/21 07:10:56  97 6 °F (36 4 °C)  78  16  109/67  81  93 %  --  --   08/27/21 23:33:38  97 7 °F (36 5 °C)  79  18  108/68  81  93 %  --  --   08/27/21 1844  --  70  --  146/71  96  95 %  --  --   08/27/21 18:43:21  97 7 °F (36 5 °C)  63  18  146/71  96  95 %  --  --       Pertinent Labs/Diagnostic Test Results:   CT facial bones 8/27 -- Findings are suspicious for small subperiosteal abscess along outer cortex of right mandibular symphysis with associated focal erosive osteomyelitis and adjacent cellulitis   Right mandibular lateral incisor and right maxillary canine teeth are likely odontogenic sources of infection   Difficult to exclude an underlying osseous lesion given diffuse inflammation   Recommend dental consultation for further evaluation and short-term follow-up CT maxillofacial with contrast        Results from last 7 days   Lab Units 08/28/21  0510 08/27/21  1207   WBC Thousand/uL 5 31 6 40   HEMOGLOBIN g/dL 12 9 13 6   HEMATOCRIT % 39 4 40 0*   PLATELETS Thousands/uL 197 223   NEUTROS ABS Thousands/µL 3 90 4 90     Results from last 7 days   Lab Units 08/28/21  0510 08/27/21  1207   SODIUM mmol/L 137 135   POTASSIUM mmol/L 4 3 4 2   CHLORIDE mmol/L 107 100   CO2 mmol/L 29 29   ANION GAP mmol/L 1* 6   BUN mg/dL 15 17   CREATININE mg/dL 0 84 0 85   EGFR ml/min/1 73sq m 71 70   CALCIUM mg/dL 8 6 9 2   MAGNESIUM mg/dL 2 1  --      Results from last 7 days   Lab Units 08/28/21  0510 08/27/21  1207   GLUCOSE RANDOM mg/dL 93 93     Results from last 7 days   Lab Units 08/27/21  1527   PROCALCITONIN ng/ml <0 05     Results from last 7 days   Lab Units 08/28/21  0958 08/27/21  1527   BLOOD CULTURE   --  No Growth at 24 hrs  No Growth at 24 hrs     GRAM STAIN RESULT  Rare Polys  No organisms seen  Rare Polys  No organisms seen  --    WOUND CULTURE  No growth  --          Past Medical History:   Diagnosis Date    Anxiety     Cancer ([facility] [address] ) ~2009    Tonsils- followed by Oncology( Dr Annie Camilo,   [place])    Disease of thyroid gland      Present on Admission:   Acquired hypothyroidism   Depression with anxiety   Malignant neoplasm of tonsil ([facility] [address] )      Admitting Diagnosis: Osteomyelitis of mandible [M27 2]  Age/Sex: 72 y o  female  Admission Orders:  Scheduled Medications:  acetaminophen, 975 mg, Oral, Q8H [facility]  ampicillin-sulbactam, 3 g, Intravenous, Q6H  busPIRone, 5 mg, Oral, BID  enoxaparin, 40 mg, Subcutaneous, Daily  FLUoxetine, 40 mg, Oral, Daily  levothyroxine, 100 mcg, Oral, Daily  lisinopril, 20 mg, Oral, Daily    Continuous IV Infusions:  lactated ringers, 125 mL/hr, Intravenous, Continuous  sodium chloride, 75 mL/hr, Intravenous, Continuous    PRN Meds:  ketorolac, 15 mg, Intravenous, Q6H PRN 8/28 x3, 8/29 x1  LORazepam, 2 mg, Oral, BID PRN 8/28 x1  morphine injection, 2 mg, Intravenous, Q4H PRN 8/27 x1, 8/28 x2  ondansetron, 4 mg, Intravenous, Q6H PRN  oxyCODONE, 5 mg, Oral, Q4H PRN 8/27 x1, 8/28 x2        Network Utilization Review Department  ATTENTION: Please call with any questions or concerns to [phone] and carefully listen to the prompts so that you are directed to the right person  All voicemails are confidential   Mo Reyes all requests for admission clinical reviews, approved or denied determinations and any other requests to dedicated fax number below belonging to the campus where the patient is receiving treatment   List of dedicated fax numbers for the Facilities:  [address] DENIALS (Administrative/Medical Necessity) [phone]   [address] [place] [address] (Maternity/NICU/Pediatrics) [phone] [address] [phone] [address] Katherine Fulton 5874 [address] [facility] [phone] 187 [facility] Valentina Burdick 1481 [address] [place] [address] [phone]

## 2021-08-29 NOTE — PROGRESS NOTES
Progress Note - OMFS  Parish Miu 72 y o  female MRN: [id number]  Unit/Bed#: -Horacio Encounter: [id number]    Assessment:  70F w/ PMHx SCC tonsils s/p tonsillectomy, XRT completed 2008 now 1 day s/p I&D anterior mandible vestibular abscess, extraction teeth #25,26,27 under GA in the OR  Patient recovered well with diminished pain, tolerating po, decreased swelling  Plan:  -Continue IV unasyn until discharge  Convert to po amoxicillin x14 days and po flagyl x7 days on discharge  -Recommend consultation with appropriate follow up for HBO therapy as outpatient given hx of H&N XRT, risk of chronic osteomyelitis  - f/u cultures, bone biopsy  - Regular diet vs dysphagia   - OOB ad seema, Lovenox subq  - Ice to face prn first 24 hours  - Can start peridex mouth rinse BID and discharge on 14 day course  - Pain prn, recommend ibuprofen 400mg q6h prn pain, Roxicodone 5mg q6h for severe pain      Subjective/Objective   Chief Complaint: My mouth is a little swollen  Subjective: Patient endorses diminished pain, less swelling, able to eat comfortably  Objective: Decreased lower facial swelling, no fluctuance IO or EO  Surgical wound hemostatic, sutures intact, extraction sockets WNL    Blood pressure 133/74, pulse 73, temperature 97 6 °F (36 4 °C), resp  rate 16, weight 68 5 kg (151 lb 1 6 oz), SpO2 96 %  ,Body mass index is 24 39 kg/m²        Intake/Output Summary (Last 24 hours) at 8/29/2021 1131  Last data filed at 8/29/2021 0134  Gross per 24 hour   Intake 50 ml   Output --   Net 50 ml       Invasive Devices     Peripheral Intravenous Line            Peripheral IV 08/27/21 Right Antecubital 1 day                Physical Exam: /74   Pulse 73   Temp 97 6 °F (36 4 °C)   Resp 16   Wt 68 5 kg (151 lb 1 6 oz)   SpO2 96%   BMI 24 39 kg/m²     General Appearance:    Alert, cooperative, no distress, appears stated age   Head:    Normocephalic, without obvious abnormality, atraumatic, mild lower facial swelling Mouth hemostatic, sutures intact   Eyes:    PERRL, conjunctiva/corneas clear, EOM's intact, fundi     benign, both eyes   Ears:    Normal TM's and external ear canals, both ears   Nose:   Nares normal, septum midline, mucosa normal, no drainage    or sinus tenderness   Throat:   Lips, mucosa, and tongue normal; teeth and gums normal   Neck:   Supple, symmetrical, trachea midline, no adenopathy;     thyroid:  no enlargement/tenderness/nodules; no carotid    bruit or JVD   Back:     Symmetric, no curvature, ROM normal, no CVA tenderness   Lungs:     Clear to auscultation bilaterally, respirations unlabored   Chest Wall:    No tenderness or deformity    Heart:    Regular rate and rhythm, S1 and S2 normal, no murmur, rub   or gallop                   Extremities:   Extremities normal, atraumatic, no cyanosis or edema   Pulses:   2+ and symmetric all extremities   Skin:   Skin color, texture, turgor normal, no rashes or lesions   Lymph nodes:   Cervical, supraclavicular, and axillary nodes normal   Neurologic:   CNII-XII intact, normal strength, sensation and reflexes     throughout       Lab, Imaging and other studies:CBC: No results found for: WBC, HGB, HCT, MCV, PLT, ADJUSTEDWBC, MCH, MCHC, RDW, MPV, NRBC, CMP: No results found for: SODIUM, K, CL, CO2, ANIONGAP, BUN, CREATININE, GLUCOSE, CALCIUM, AST, ALT, ALKPHOS, PROT, BILITOT, EGFR, Coagulation: No results found for: PT, INR, APTT  VTE Pharmacologic Prophylaxis: Sequential compression device (Venodyne)   VTE Mechanical Prophylaxis: sequential compression device

## 2021-08-29 NOTE — PROGRESS NOTES
[address] [facility]  Progress Note Ruthy Payan 1950, 72 y o  female MRN: [id number]  Unit/Bed#: MS Casanova-Horacio Encounter: [id number]  Primary Care Provider: MARY Morgan   Date and time admitted to hospital: 8/27/2021  6:34 PM    * Dental abscess  Assessment & Plan  Patient presented to [facility] ED complaining of lower jaw swelling and has been gradually worsening over last 1 week  She is afebrile without leukocytosis  CT scan findings are suspicious for small subperiosteal abscess along outer cortex of right mandibular symphysis with associated focal erosive osteomyelitis and adjacent cellulitis  ·  Right mandibular lateral incisor and right maxillary canine teeth are likely odontogenic sources of infection  Difficult to exclude an underlying osseous lesion given diffuse inflammation  · Patient was transferred to [facility] for oral surgery evaluation and treatment  · Now post op day # 1 : EXTRACTION TEETH 25,26,27 (Right)INCISION AND DRAINAGE (I&D) HEAD/FACE (Right)   · Soft tissue and mandibular bone biopsy  · IV Unasyn  - cultures in process post procedure day # 1 - so far no growth   · - per omfs will discharge tomorrow  On amoxicillin x 14 days and po flagyl x 7 days   · - will need to talk with cm and wound care tomorrow to set up HBO therapy in setting of pts prior H &N xrt hx and risk for chronic osteomyelitis   · Ice to face   · Started on peridex mouth rinse bid discharge on 14 day course   · As needed ibuprofen q 6 hrs prn for pain   · Dysphagia on thin liquids  · Appreciate OMFS  · Pain control  · IV fluid hydration    Essential hypertension  Assessment & Plan  · Continue lisinopril    Acquired hypothyroidism  Assessment & Plan  · Continue levothyroxine    Depression with anxiety  Assessment & Plan  · Continue Prozac and Buspar  · P r n   Lorazepam    Malignant neoplasm of tonsil [facility])  Assessment & Plan  · 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 treated with chemoradiation completed in  evidence of recurrence and declared cancer free in 2013        VTE Pharmacologic Prophylaxis: VTE Score: 6 High Risk (Score >/= 5) - Pharmacological DVT Prophylaxis Ordered: enoxaparin (Lovenox)  Sequential Compression Devices Ordered  Patient Centered Rounds: I performed bedside rounds with nursing staff today  Discussions with Specialists or Other Care Team Provider: nursing     Education and Discussions with Family / Patient: patient   Time Spent for Care: 45 minutes  More than 50% of total time spent on counseling and coordination of care as described above  Current Length of Stay: 2 day(s)  Current Patient Status: Inpatient   Certification Statement: The patient will continue to require additional inpatient hospital stay due to ongoing need for further abx overnight and coordination of care along with cultures   Discharge Plan: Anticipate discharge tomorrow to home  Code Status: Level 1 - Full Code    Subjective:   Pt is doing much better today she ate all of her pureed lunch  She has some aching in right jaw no further bleeding and is eager to go home soon  [birth year]  She denies smoking  Objective:     Vitals:   Temp (24hrs), Av 6 °F (36 4 °C), Min:97 4 °F (36 3 °C), Max:97 8 °F (36 6 °C)    Temp:  [97 4 °F (36 3 °C)-97 8 °F (36 6 °C)] 97 4 °F (36 3 °C)  HR:  [73-91] 90  Resp:  [16-18] 18  BP: (122-143)/(70-80) 143/70  SpO2:  [92 %-96 %] 96 %  Body mass index is 24 39 kg/m²  Input and Output Summary (last 24 hours): Intake/Output Summary (Last 24 hours) at 2021 1809  Last data filed at 2021 1700  Gross per 24 hour   Intake 530 ml   Output --   Net 530 ml       Physical Exam:   Physical Exam  Constitutional:       General: She is not in acute distress  Appearance: She is not ill-appearing, toxic-appearing or diaphoretic     HENT:      Head: Normocephalic  Mouth/Throat:      Pharynx: Oropharynx is clear  Eyes:      General:         Left eye: No discharge  Conjunctiva/sclera: Conjunctivae normal    Cardiovascular:      Rate and Rhythm: Normal rate  Heart sounds: No murmur heard  No friction rub  No gallop  Pulmonary:      Effort: No respiratory distress  Breath sounds: No stridor  No wheezing, rhonchi or rales  Chest:      Chest wall: No tenderness  Abdominal:      General: There is no distension  Palpations: There is no mass  Tenderness: There is no abdominal tenderness  There is no right CVA tenderness, left CVA tenderness, guarding or rebound  Hernia: No hernia is present  Musculoskeletal:         General: Swelling (right jaw but slight ) present  No tenderness, deformity or signs of injury  Right lower leg: No edema  Left lower leg: No edema  Skin:     Coloration: Skin is not jaundiced or pale  Findings: No bruising, erythema, lesion or rash  Neurological:      Mental Status: She is alert and oriented to person, place, and time  Psychiatric:         Behavior: Behavior normal          Additional Data:     Labs:  Results from last 7 days   Lab Units 08/28/21  0510   WBC Thousand/uL 5 31   HEMOGLOBIN g/dL 12 9   HEMATOCRIT % 39 4   PLATELETS Thousands/uL 197   NEUTROS PCT % 74   LYMPHS PCT % 13*   MONOS PCT % 9   EOS PCT % 3     Results from last 7 days   Lab Units 08/28/21  0510   SODIUM mmol/L 137   POTASSIUM mmol/L 4 3   CHLORIDE mmol/L 107   CO2 mmol/L 29   BUN mg/dL 15   CREATININE mg/dL 0 84   ANION GAP mmol/L 1*   CALCIUM mg/dL 8 6   GLUCOSE RANDOM mg/dL 93                 Results from last 7 days   Lab Units 08/27/21  1527   PROCALCITONIN ng/ml <0 05       Lines/Drains:  Invasive Devices     Peripheral Intravenous Line            Peripheral IV 08/27/21 Right Antecubital 2 days                      Imaging: No pertinent imaging reviewed      Recent Cultures (last 7 days): Results from last 7 days   Lab Units 08/28/21  0958 08/27/21  1527   BLOOD CULTURE   --  No Growth at 24 hrs  No Growth at 24 hrs  GRAM STAIN RESULT  Rare Polys  No organisms seen  Rare Polys  No organisms seen  --    WOUND CULTURE  No growth  --        Last 24 Hours Medication List:   Current Facility-Administered Medications   Medication Dose Route Frequency Provider Last Rate    acetaminophen  975 mg Oral Q8H [facility] Jeferson Rachiele, DMD      ampicillin-sulbactam  3 g Intravenous Q6H Jeferson Rachiele, DMD 3 g (08/29/21 1314)    busPIRone  5 mg Oral BID Jeferson Rachiele, DMD      chlorhexidine  15 mL Swish [employer] Spit Q12H [address] [place] [address], MARY      enoxaparin  40 mg Subcutaneous Daily Jeferson Rachiele, DMD      FLUoxetine  40 mg Oral Daily Jeferson Rachiele, DMD      ibuprofen  400 mg Oral Q6H PRN MARY Montanez      ketorolac  15 mg Intravenous Q6H PRN Lexie Delaney, DMD      levothyroxine  100 mcg Oral Daily [facility], DMD      lisinopril  20 mg Oral Daily Jeferson Rachiele, DMD      LORazepam  2 mg Oral BID PRN Lexie Delaney, DMD      morphine injection  2 mg Intravenous Q4H PRN [facility], DMD      ondansetron  4 mg Intravenous Q6H PRN Jeferson Isiahhiele, DMD      oxyCODONE  5 mg Oral Q4H PRN Jeferson Rachiele, DMD      sodium chloride  75 mL/hr Intravenous Continuous Jeferson Rachiele, DMD 75 mL/hr (08/28/21 1937)        Today, Patient Was Seen By: MARY Montanez    **Please Note: This note may have been constructed using a voice recognition system  **

## 2021-08-30 VITALS
TEMPERATURE: 97.5 F | DIASTOLIC BLOOD PRESSURE: 65 MMHG | BODY MASS INDEX: 24.39 KG/M2 | WEIGHT: 151.1 LBS | HEART RATE: 88 BPM | OXYGEN SATURATION: 99 % | RESPIRATION RATE: 18 BRPM | SYSTOLIC BLOOD PRESSURE: 115 MMHG

## 2021-08-30 PROBLEM — C09.9 MALIGNANT NEOPLASM OF TONSIL (HCC): Status: RESOLVED | Noted: 2020-02-18 | Resolved: 2021-08-30

## 2021-08-30 LAB
BACTERIA SPEC ANAEROBE CULT: NORMAL
BASOPHILS # BLD AUTO: 0.03 THOUSANDS/ΜL (ref 0–0.1)
BASOPHILS NFR BLD AUTO: 1 % (ref 0–1)
EOSINOPHIL # BLD AUTO: 0.14 THOUSAND/ΜL (ref 0–0.61)
EOSINOPHIL NFR BLD AUTO: 3 % (ref 0–6)
ERYTHROCYTE [DISTWIDTH] IN BLOOD BY AUTOMATED COUNT: 11.7 % (ref 11.6–15.1)
HCT VFR BLD AUTO: 35.1 % (ref 34.8–46.1)
HGB BLD-MCNC: 11.2 G/DL (ref 11.5–15.4)
IMM GRANULOCYTES # BLD AUTO: 0 THOUSAND/UL (ref 0–0.2)
IMM GRANULOCYTES NFR BLD AUTO: 0 % (ref 0–2)
LYMPHOCYTES # BLD AUTO: 1.34 THOUSANDS/ΜL (ref 0.6–4.47)
LYMPHOCYTES NFR BLD AUTO: 33 % (ref 14–44)
MCH RBC QN AUTO: 32.6 PG (ref 26.8–34.3)
MCHC RBC AUTO-ENTMCNC: 31.9 G/DL (ref 31.4–37.4)
MCV RBC AUTO: 102 FL (ref 82–98)
MONOCYTES # BLD AUTO: 0.31 THOUSAND/ΜL (ref 0.17–1.22)
MONOCYTES NFR BLD AUTO: 8 % (ref 4–12)
NEUTROPHILS # BLD AUTO: 2.3 THOUSANDS/ΜL (ref 1.85–7.62)
NEUTS SEG NFR BLD AUTO: 55 % (ref 43–75)
NRBC BLD AUTO-RTO: 0 /100 WBCS
PLATELET # BLD AUTO: 203 THOUSANDS/UL (ref 149–390)
PMV BLD AUTO: 10.4 FL (ref 8.9–12.7)
RBC # BLD AUTO: 3.44 MILLION/UL (ref 3.81–5.12)
WBC # BLD AUTO: 4.12 THOUSAND/UL (ref 4.31–10.16)

## 2021-08-30 PROCEDURE — 85025 COMPLETE CBC W/AUTO DIFF WBC: CPT | Performed by: NURSE PRACTITIONER

## 2021-08-30 PROCEDURE — 99239 HOSP IP/OBS DSCHRG MGMT >30: CPT | Performed by: NURSE PRACTITIONER

## 2021-08-30 RX ORDER — ACETAMINOPHEN 325 MG/1
975 TABLET ORAL EVERY 8 HOURS SCHEDULED
Refills: 0
Start: 2021-08-30

## 2021-08-30 RX ORDER — METRONIDAZOLE 500 MG/1
500 TABLET ORAL EVERY 8 HOURS SCHEDULED
Qty: 24 TABLET | Refills: 0 | Status: SHIPPED | OUTPATIENT
Start: 2021-08-30 | End: 2021-09-06

## 2021-08-30 RX ORDER — CHLORHEXIDINE GLUCONATE 0.12 MG/ML
15 RINSE ORAL EVERY 12 HOURS SCHEDULED
Qty: 120 ML | Refills: 0 | Status: SHIPPED | OUTPATIENT
Start: 2021-08-30 | End: 2021-09-13

## 2021-08-30 RX ORDER — IBUPROFEN 400 MG/1
400 TABLET ORAL EVERY 8 HOURS PRN
Qty: 21 TABLET | Refills: 0 | Status: SHIPPED | OUTPATIENT
Start: 2021-08-30 | End: 2022-03-29

## 2021-08-30 RX ORDER — OXYCODONE HYDROCHLORIDE 5 MG/1
5 TABLET ORAL EVERY 6 HOURS PRN
Qty: 20 TABLET | Refills: 0 | Status: SHIPPED | OUTPATIENT
Start: 2021-08-30 | End: 2021-09-04

## 2021-08-30 RX ORDER — AMOXICILLIN 500 MG/1
500 TABLET, FILM COATED ORAL 2 TIMES DAILY
Qty: 28 TABLET | Refills: 0 | Status: SHIPPED | OUTPATIENT
Start: 2021-08-30 | End: 2021-09-13

## 2021-08-30 RX ORDER — ASPIRIN 81 MG/1
81 TABLET ORAL DAILY
Refills: 0
Start: 2021-09-06

## 2021-08-30 RX ADMIN — ENOXAPARIN SODIUM 40 MG: 40 INJECTION SUBCUTANEOUS at 08:39

## 2021-08-30 RX ADMIN — FLUOXETINE 40 MG: 20 CAPSULE ORAL at 08:41

## 2021-08-30 RX ADMIN — ACETAMINOPHEN 975 MG: 325 TABLET, FILM COATED ORAL at 06:13

## 2021-08-30 RX ADMIN — LEVOTHYROXINE SODIUM 100 MCG: 100 TABLET ORAL at 08:41

## 2021-08-30 RX ADMIN — BUSPIRONE HYDROCHLORIDE 5 MG: 5 TABLET ORAL at 08:41

## 2021-08-30 RX ADMIN — SODIUM CHLORIDE 3 G: 9 INJECTION, SOLUTION INTRAVENOUS at 00:31

## 2021-08-30 RX ADMIN — LISINOPRIL 20 MG: 20 TABLET ORAL at 08:41

## 2021-08-30 RX ADMIN — SODIUM CHLORIDE 3 G: 9 INJECTION, SOLUTION INTRAVENOUS at 06:16

## 2021-08-30 RX ADMIN — CHLORHEXIDINE GLUCONATE 0.12% ORAL RINSE 15 ML: 1.2 LIQUID ORAL at 08:39

## 2021-08-30 RX ADMIN — SODIUM CHLORIDE 75 ML/HR: 0.9 INJECTION, SOLUTION INTRAVENOUS at 01:37

## 2021-08-30 NOTE — PLAN OF CARE
Problem: INFECTION - ADULT  Goal: Absence or prevention of progression during hospitalization  Description: INTERVENTIONS:  - Assess and monitor for signs and symptoms of infection  - Monitor lab/diagnostic results  - Monitor all insertion sites, i e  indwelling lines, tubes, and drains  - Monitor endotracheal if appropriate and nasal secretions for changes in amount and color  - [place] appropriate cooling/warming therapies per order  - Administer medications as ordered  - Instruct and encourage patient and family to use good hand hygiene technique  - Identify and instruct in appropriate isolation precautions for identified infection/condition  8/30/2021 1242 by Yaniv Raphael RN  Outcome: Adequate for Discharge  8/30/2021 1242 by Yaniv Raphael RN  Outcome: Adequate for Discharge  Goal: Absence of fever/infection during neutropenic period  Description: INTERVENTIONS:  - Monitor WBC    8/30/2021 1242 by Yaniv Raphael RN  Outcome: Adequate for Discharge  8/30/2021 1242 by Yaniv Raphael RN  Outcome: Adequate for Discharge     Problem: Nutrition/Hydration-ADULT  Goal: Nutrient/Hydration intake appropriate for improving, restoring or maintaining nutritional needs  Description: Monitor and assess patient's nutrition/hydration status for malnutrition  Collaborate with interdisciplinary team and initiate plan and interventions as ordered  Monitor patient's weight and dietary intake as ordered or per policy  Utilize nutrition screening tool and intervene as necessary  Determine patient's food preferences and provide high-protein, high-caloric foods as appropriate       INTERVENTIONS:  - Monitor oral intake, urinary output, labs, and treatment plans  - Assess nutrition and hydration status and recommend course of action  - Evaluate amount of meals eaten  - Assist patient with eating if necessary   - Allow adequate time for meals  - Recommend/ encourage appropriate diets, oral nutritional supplements, and vitamin/mineral supplements  - Order, calculate, and assess calorie counts as needed  - Recommend, monitor, and adjust tube feedings and TPN/PPN based on assessed needs  - Assess need for intravenous fluids  - Provide specific nutrition/hydration education as appropriate  - Include patient/family/caregiver in decisions related to nutrition  8/30/2021 1242 by Alex Zabala RN  Outcome: Adequate for Discharge  8/30/2021 1242 by Alex Zabala RN  Outcome: Adequate for Discharge     Problem: Potential for Falls  Goal: Patient will remain free of falls  Description: INTERVENTIONS:  - Educate patient/family on patient safety including physical limitations  - Instruct patient to call for assistance with activity   - Consult OT/PT to assist with strengthening/mobility   - Keep Call bell within reach  - Keep bed low and locked with side rails adjusted as appropriate  - Keep care items and personal belongings within reach  - Initiate and maintain comfort rounds  - Make Fall Risk Sign visible to staff  - Offer Toileting every  Hours, in advance of need  - Initiate/Maintain alarm  - Obtain necessary fall risk management equipment:   - Apply yellow socks and bracelet for high fall risk patients  - Consider moving patient to room near nurses station  8/30/2021 1242 by Alex Zabala RN  Outcome: Adequate for Discharge  8/30/2021 1242 by Alex Zabala RN  Outcome: Adequate for Discharge     Problem: MOBILITY - ADULT  Goal: Maintain or return to baseline ADL function  Description: INTERVENTIONS:  -  Assess patient's ability to carry out ADLs; assess patient's baseline for ADL function and identify physical deficits which impact ability to perform ADLs (bathing, care of mouth/teeth, toileting, grooming, dressing, etc )  - Assess/evaluate cause of self-care deficits   - Assess range of motion  - Assess patient's mobility; develop plan if impaired  - Assess patient's need for assistive devices and provide as appropriate  - Encourage maximum independence but intervene and supervise when necessary  - Involve family in performance of ADLs  - Assess for home care needs following discharge   - Consider OT consult to assist with ADL evaluation and planning for discharge  - Provide patient education as appropriate  8/30/2021 1242 by Aixa iWley RN  Outcome: Adequate for Discharge  8/30/2021 1242 by Aixa Wiley RN  Outcome: Adequate for Discharge  Goal: Maintains/Returns to pre admission functional level  Description: INTERVENTIONS:  - Perform BMAT or MOVE assessment daily    - Set and communicate daily mobility goal to care team and patient/family/caregiver  - Collaborate with rehabilitation services on mobility goals if consulted  - Perform Range of Motion  times a day  - Reposition patient every  hours    - Dangle patient  times a day  - Stand patient  times a day  - Ambulate patient  times a day  - Out of bed to chair  times a day   - Out of bed for meals times a day  - Out of bed for toileting  - Record patient progress and toleration of activity level   8/30/2021 1242 by Aixa Wiley RN  Outcome: Adequate for Discharge  8/30/2021 1242 by Aixa Wiley RN  Outcome: Adequate for Discharge

## 2021-08-30 NOTE — CASE MANAGEMENT
Pt is not a <30 day readmission or current bundle  Risk of unplanned readmission score is 9 (green)  Pt admitted due to dental abscess, s/p teeth extraction with I&D  Pt documented as alert and oriented x4  CM spoke with pt to introduce CM role and begin discharge planning  Pt lives with her significant other, Marisa Celeste ([phone]) in a 1 story house that has 1 DEANNE  Pt reports being independent with ADLs PTA, no use of DME for ambulation  Pt reports no history of VNA, STR, inpatient MH treatment or D/A treatment  Pt drives and is retired  PCP is [address] [place] [address] ([phone]) and pt uses [facility] pharmacy in [place] for prescriptions  Pt's significant other to assist with transportation at time of discharge  Patient reviewed during care coordination rounds with Misael Craven who informed that pt will need hyperbaric treatment at discharge  CM called  wound care ([phone] op 1) and spoke with Estuardo Tinajero who informed that pt will need to call to schedule an appointment  At the first appointment pt will need to provide the following information: time frame in which pt received radiation, where she received the radiation, as well as the name of her Radiologist  At that point, they will move forward with creating a treatment plan  CM spoke with pt to inform of above and put instructions with phone number on pt's DCI  MARY Tim aware of same  CM reviewed d/c planning process including the following: identifying help at home, patient preference for d/c planning needs, Discharge Lounge, [facility] Meds to Bed program, availability of treatment team to discuss questions or concerns patient and/or family may have regarding understanding medications and recognizing signs and symptoms once discharged  CM also encouraged patient to follow up with all recommended appointments after discharge  Patient advised of importance for patient and family to participate in managing patients medical well being

## 2021-08-30 NOTE — DISCHARGE SUMMARY
[facility]  Discharge- Divya Eye 1950, 80 y o  female MRN: [id number]  Unit/Bed#: -Horacio Encounter: [id number]  Primary Care Provider: MARY Salazar   Date and time admitted to hospital: 8/27/2021  6:34 PM    * Dental abscess  Assessment & Plan  Patient presented to [facility] ED complaining of lower jaw swelling and has been gradually worsening over last 1 week  She is afebrile without leukocytosis  CT scan findings are suspicious for small subperiosteal abscess along outer cortex of right mandibular symphysis with associated focal erosive osteomyelitis and adjacent cellulitis  ·  Right mandibular lateral incisor and right maxillary canine teeth are likely odontogenic sources of infection  Difficult to exclude an underlying osseous lesion given diffuse inflammation  · Patient was transferred to [facility] for oral surgery evaluation and treatment  · Now post op day # 1 : EXTRACTION TEETH 25,26,27 (Right)INCISION AND DRAINAGE (I&D) HEAD/FACE (Right)   · Soft tissue and mandibular bone biopsy  · IV Unasyn  - cultures in process post procedure day # 1 - so far no growth   · - per omfs will discharge tomorrow  On amoxicillin x 14 days and po flagyl x 7 days   · - will need to talk with cm and wound care tomorrow to set up HBO therapy in setting of pts prior H &N xrt hx and risk for chronic osteomyelitis   · Ice to face   · Started on peridex mouth rinse bid discharge on 14 day course   · As needed ibuprofen q 6 hrs prn for pain   · Dysphagia on thin liquids  · Appreciate OMFS  · Pain control  · IV fluid hydration    Essential hypertension  Assessment & Plan  · Continue lisinopril    Acquired hypothyroidism  Assessment & Plan  · Continue levothyroxine    Depression with anxiety  Assessment & Plan  · Continue Prozac and Buspar  · P r n   Lorazepam    Malignant neoplasm of tonsil (HCC)-resolved as of 8/30/2021  Assessment & Plan  · 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 treated with chemoradiation completed in 2008 evidence of recurrence and declared cancer free in 2013        Medical Problems     Resolved Problems  Date Reviewed: 8/30/2021        Resolved    Malignant neoplasm of tonsil ([address] ) 8/30/2021     Resolved by  Francisco Madsen              Discharging Physician / Practitioner: Francisco Madsen  PCP: Francisco Martínez  Admission Date:   Admission Orders (From admission, onward)     Ordered        08/27/21 [address] [facility]  Inpatient Admission  Once                   Discharge Date: 08/30/21    Consultations During Hospital Stay:  · Ministerio Colindres dmd omfs    Procedures Performed:   · Cultures show no growth at time of discharge  · However after discharge fungal culture demonstrating 1 colony of yeast at prelim results  · Culture and Gram stain show 3+ growth of alpha hemolytic Streptococcus not Enterococcus  · Anaerobic culture with no anaerobes isolated  · AFB stain with no acid-fast bacillus seen  · Blood cultures x2 show no growth at 72 hours    Significant Findings / Test Results:   · See above    Incidental Findings:   · None    Test Results Pending at Discharge (will require follow up):   Preliminaries results  · However after discharge fungal culture demonstrating 1 colony of yeast at prelim results  · Culture and Gram stain show 3+ growth of alpha hemolytic Streptococcus not Enterococcus  · Anaerobic culture with no anaerobes isolated  · AFB stain with no acid-fast bacillus seen  08/28/2021:EXTRACTION TEETH 25,26,27 (Right)  INCISION AND DRAINAGE (I&D) HEAD/FACE (Right)   · Soft tissue and mandibular bone biopsy     Outpatient Tests Requested:  · Call to schedule follow-up with PCP within the next week  · Call to schedule follow-up with the OMFS surgeon  · Call [address] [facility] [address] to set up your hyperbaric chamber treatments    Complications:  None    Reason for Admission:  Dental abscess with osteomyelitis    Hospital Course:   Zuleyma Lazar is a 80 y o  female patient who originally presented to the hospital on 8/27/2021 due to dental abscess with osteomyelitis  Patient with past medical history of squamous cell carcinoma of the tonsil, hypertension hypothyroidism, depression and anxiety  Presents to [facility] Emergency Room complaining of lower jaw swelling and had been gradually worsening over the last week  For patient has history of squamous cell carcinoma of the tonsil for which she had previously been treated by ENT and received chemotherapy and radiation  She also states that she had extensive dental work done secondary to the cancer  She stated that her lower mandible had multiple root canals dental hardware placed  At this time she was noted to have obvious pronounced swelling of the bilateral lower mandible and chin she was exquisitely tender over those areas and she denied any known trauma or injury to the area  Patient reports chronic history of dysphagia  Patient at this point was transferred to [facility] for evaluation and treatment  Patient was seen by Jamar Akins noted to have periapical pathology multiple mandibular teeth and right mandibular anterior super periosteal abscess  Patient had radiographic evidence of cortical destruction and sclerosis in the right mandibular parasymphysis ease area which 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  Patient was recommended for consultation with hyperbaric team as postoperative hyperbaric dives a likely required  Their recommendation was to plan for incision and drainage and extraction of multiple teeth with possible biopsy in the operating room under general anesthesia  Patient responded well to surgery plan however was to set up hyperbaric treatments    Upon discussion with case management patient was recommended to call the [address]  [facility] and set up hyperbaric treatments due to patient's prior radiation treatments  Patient was treated during admission on Unasyn IV and then transitioned at discharge to amoxicillin times 14 days and p o  Flagyl 7 days on discharge  Patient will require follow-up with OMFS to find out final bone biopsy results follow-up on cultures and blood cultures  She may apply ice to face for the next 24 hours as needed she should be up and out of bed patient to start out with pureed diet 1st and gradually increase  Please see above list of diagnoses and related plan for additional information  Condition at Discharge: fair    Discharge Day Visit / Exam:   Subjective:  Upon arrival to room which patient happy birthday  She is feeling well she is eager to go home does have some tenderness and discomfort in right jaw no further drainage  No fevers or chills no dizziness or lightheadedness no nausea vomiting  Vitals: Blood Pressure: 115/65 (08/30/21 0745)  Pulse: 88 (08/30/21 0745)  Temperature: 97 5 °F (36 4 °C) (08/30/21 0745)  Temp Source: Temporal (08/28/21 1058)  Respirations: 18 (08/29/21 1647)  Weight - Scale: 68 5 kg (151 lb 1 6 oz) (08/27/21 1925)  SpO2: 99 % (08/30/21 0745)  Exam:   Physical Exam  Constitutional:       General: She is not in acute distress  Appearance: She is not ill-appearing, toxic-appearing or diaphoretic  HENT:      Head:      Comments: Patient with slight right jaw tenderness  Right lower teeth edentulous     Mouth/Throat:      Pharynx: Oropharynx is clear  Cardiovascular:      Rate and Rhythm: Normal rate  Heart sounds: No murmur heard  No friction rub  No gallop  Pulmonary:      Effort: No respiratory distress  Breath sounds: No stridor  No wheezing, rhonchi or rales  Chest:      Chest wall: No tenderness  Abdominal:      General: There is no distension  Palpations: There is no mass  Tenderness: There is no abdominal tenderness   There is no right CVA tenderness, left CVA tenderness, guarding or rebound  Hernia: No hernia is present  Musculoskeletal:         General: No swelling, tenderness, deformity or signs of injury  Right lower leg: No edema  Left lower leg: No edema  Skin:     General: Skin is warm  Coloration: Skin is not jaundiced or pale  Findings: No bruising, erythema, lesion or rash  Neurological:      Mental Status: She is alert  Psychiatric:         Behavior: Behavior normal           Discussion with Family: Updated  (friend) at bedside  Discharge instructions/Information to patient and family:   See after visit summary for information provided to patient and family  Provisions for Follow-Up Care:  See after visit summary for information related to follow-up care and any pertinent home health orders  Disposition:   Home    Planned Readmission:  No plan for readmission     Discharge Statement:  I spent 45 minutes discharging the patient  This time was spent on the day of discharge  I had direct contact with the patient on the day of discharge  Greater than 50% of the total time was spent examining patient, answering all patient questions, arranging and discussing plan of care with patient as well as directly providing post-discharge instructions  Additional time then spent on discharge activities  Discharge Medications:  See after visit summary for reconciled discharge medications provided to patient and/or family        **Please Note: This note may have been constructed using a voice recognition system**

## 2021-08-30 NOTE — DISCHARGE INSTRUCTIONS
- Oral  amoxicillin x14 days and oral flagyl x7 days on discharge    - Consultation with appropriate follow up for HBO therapy  risk of chronic osteomyelitis  - f/u cultures, bone biopsy  - Regular diet vs dysphagia   -  OOB ad seema,  -  Ice to face as needed  first 24 hours  - Can start peridex mouth rinse twice a day for 14 day course  - Pain as needed recommend ibuprofen 400mg every 8 hrs as needed for  pain,   - hold Asprin for the next 5 days

## 2021-08-30 NOTE — ASSESSMENT & PLAN NOTE
Patient presented to [facility] ED complaining of lower jaw swelling and has been gradually worsening over last 1 week  She is afebrile without leukocytosis  CT scan findings are suspicious for small subperiosteal abscess along outer cortex of right mandibular symphysis with associated focal erosive osteomyelitis and adjacent cellulitis  ·  Right mandibular lateral incisor and right maxillary canine teeth are likely odontogenic sources of infection  Difficult to exclude an underlying osseous lesion given diffuse inflammation    · Patient was transferred to [facility] for oral surgery evaluation and treatment  · Now post op day # 1 : EXTRACTION TEETH 25,26,27 (Right)INCISION AND DRAINAGE (I&D) HEAD/FACE (Right)   · Soft tissue and mandibular bone biopsy  · IV Unasyn  - cultures in process post procedure day # 1 - so far no growth   · - per omfs will discharge tomorrow  On amoxicillin x 14 days and po flagyl x 7 days   · - will need to talk with cm and wound care tomorrow to set up HBO therapy in setting of pts prior H &N xrt hx and risk for chronic osteomyelitis   · Ice to face   · Started on peridex mouth rinse bid discharge on 14 day course   · As needed ibuprofen q 6 hrs prn for pain   · Dysphagia on thin liquids  · Appreciate OMFS  · Pain control  · IV fluid hydration

## 2021-08-31 ENCOUNTER — TRANSITIONAL CARE MANAGEMENT (OUTPATIENT)
Dept: FAMILY MEDICINE CLINIC | Facility: CLINIC | Age: 71
End: 2021-08-31

## 2021-08-31 LAB
BACTERIA TISS AEROBE CULT: ABNORMAL
BACTERIA WND AEROBE CULT: ABNORMAL
GRAM STN SPEC: ABNORMAL

## 2021-08-31 NOTE — UTILIZATION REVIEW
Notification of Discharge   This is a Notification of Discharge from our facility [address]  Please be advised that this patient has been discharge from our facility  Below you will find the admission and discharge date and time including the patients disposition  UTILIZATION REVIEW CONTACT:  Michaelle Councilman  Utilization   Network Utilization Review Department  Phone: [phone] x carefully listen to the prompts  All voicemails are confidential   Email: [email or web address]     PHYSICIAN ADVISORY SERVICES:  FOR [id number] REVIEW - MEDICAL NECESSITY DENIAL  Phone: [phone]  Fax: [phone]  Email: [email or web address]     PRESENTATION DATE: 8/27/2021  6:34 PM  OBERVATION ADMISSION DATE:   INPATIENT ADMISSION DATE: 8/27/21  6:34 PM   DISCHARGE DATE: 8/30/2021 12:44 PM  DISPOSITION: Home/Self Care Home/Self Care      IMPORTANT INFORMATION:  Send all requests for admission clinical reviews, approved or denied determinations and any other requests to dedicated fax number below belonging to the campus where the patient is receiving treatment   List of dedicated fax numbers:  [address] DENIALS (Administrative/Medical Necessity) [phone]   [address] (Maternity/NICU/Pediatrics) [phone]   [facility] [phone]   [facility] [phone]   [facility] [phone]   [facility] [address] [facility] [phone]   [facility]  [phone]   [address] [place] [address], [address]  [address] [facility] [place] [address] [facility] [phone]

## 2021-09-01 ENCOUNTER — RA CDI HCC (OUTPATIENT)
Dept: OTHER | Facility: HOSPITAL | Age: 71
End: 2021-09-01

## 2021-09-01 LAB
BACTERIA BLD CULT: NORMAL
BACTERIA BLD CULT: NORMAL

## 2021-09-01 NOTE — PROGRESS NOTES
[facility]  coding opportunities       Chart reviewed, no opportunity found: CHART REVIEWED, NO OPPORTUNITY FOUND                        Patients insurance company: [facility] [address]  (Medicare Advantage and Commercial)

## 2021-09-02 RX ORDER — FLUOXETINE 10 MG/1
20 CAPSULE ORAL DAILY
COMMUNITY
End: 2021-11-15

## 2021-09-03 ENCOUNTER — OFFICE VISIT (OUTPATIENT)
Dept: FAMILY MEDICINE CLINIC | Facility: CLINIC | Age: 71
End: 2021-09-03
Payer: COMMERCIAL

## 2021-09-03 VITALS
SYSTOLIC BLOOD PRESSURE: 134 MMHG | OXYGEN SATURATION: 98 % | BODY MASS INDEX: 24.59 KG/M2 | WEIGHT: 153 LBS | DIASTOLIC BLOOD PRESSURE: 76 MMHG | TEMPERATURE: 99 F | HEART RATE: 74 BPM | HEIGHT: 66 IN | RESPIRATION RATE: 20 BRPM

## 2021-09-03 DIAGNOSIS — C09.9 MALIGNANT NEOPLASM OF TONSIL (HCC): ICD-10-CM

## 2021-09-03 DIAGNOSIS — K04.7 DENTAL ABSCESS: Primary | ICD-10-CM

## 2021-09-03 DIAGNOSIS — I10 ESSENTIAL HYPERTENSION: ICD-10-CM

## 2021-09-03 DIAGNOSIS — E03.9 ACQUIRED HYPOTHYROIDISM: ICD-10-CM

## 2021-09-03 PROCEDURE — 99496 TRANSJ CARE MGMT HIGH F2F 7D: CPT | Performed by: NURSE PRACTITIONER

## 2021-09-03 RX ORDER — AMOXICILLIN 500 MG/1
CAPSULE ORAL
COMMUNITY
Start: 2021-08-30 | End: 2021-09-10 | Stop reason: SDUPTHER

## 2021-09-03 NOTE — PROGRESS NOTES
[facility]        NAME: Dorean Frankel is a 80 y o  female  : 1950    MRN: [id number]  DATE: September 3, 2021  TIME: 9:23 AM    Assessment and Plan   Dental abscess [K04 7]  1  Dental abscess     2  Essential hypertension     3  Acquired hypothyroidism     4  Malignant neoplasm of tonsil [facility])           Patient Instructions     There are no Patient Instructions on file for this visit  Chief Complaint     Chief Complaint   Patient presents with    Transition of Care Management         History of Present Illness       * Dental abscess  Assessment & Plan  Patient presented to [facility] ED complaining of lower jaw swelling and has been gradually worsening over last 1 week  She is afebrile without leukocytosis  CT scan findings are suspicious for small subperiosteal abscess along outer cortex of right mandibular symphysis with associated focal erosive osteomyelitis and adjacent cellulitis  ·  Right mandibular lateral incisor and right maxillary canine teeth are likely odontogenic sources of infection   Difficult to exclude an underlying osseous lesion given diffuse inflammation    · Patient was transferred to [facility] for oral surgery evaluation and treatment  · Now post op day # 1 : EXTRACTION TEETH 25,26,27 (Right)INCISION AND DRAINAGE (I&D) HEAD/FACE (Right)   · Soft tissue and mandibular bone biopsy  · IV Unasyn  - cultures in process post procedure day # 1 - so far no growth   · - per omfs will discharge tomorrow  On amoxicillin x 14 days and po flagyl x 7 days   · - will need to talk with cm and wound care tomorrow to set up HBO therapy in setting of pts prior H &N xrt hx and risk for chronic osteomyelitis   · Ice to face   · Started on peridex mouth rinse bid discharge on 14 day course   · As needed ibuprofen q 6 hrs prn for pain   · Dysphagia on thin liquids  · Appreciate OMFS  · Pain control  · IV fluid hydration    UPDATE 9/3/21:  Patient is taking Amoxicillin and Flagyl as directed, is rinsing with Peridex twice daily as directed  She has follow up appointment if OMFS Dr Gordy Colunga on 9/8 and with Wound Care 9/10 with Dr Sim Mathur  She reports "I really don't have pain but I have some tingling in my lips"  Is not taking Oxycodone- "I only took 1 time"  Does c/o dizzy and lightheaded- "when I think of food it makes me sick, I got Ensure"- we discussed increasing fluids and food to feel better  She continues to hold her Aspirin for at least another week       Essential hypertension  Assessment & Plan  · Continue lisinopril     Acquired hypothyroidism  Assessment & Plan  · Continue levothyroxine     Depression with anxiety  Assessment & Plan  · Continue Prozac and Buspar  · P r n  Lorazepam     Malignant neoplasm of tonsil (HCC)-resolved as of 8/30/2021  Assessment & Plan  · 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 treated with chemoradiation completed in 2008 evidence of recurrence and declared cancer free in 2013           Medical Problems       Resolved Problems  Date Reviewed: 8/30/2021         Resolved     Malignant neoplasm of tonsil ([address] ) 8/30/2021       Resolved by  MARY Handy              Discharging Physician / Practitioner: Francisco Handy  PCP: Francisco Ashley  Admission Date:   Admission Orders (From admission, onward)        Ordered         08/27/21 1840     Inpatient Admission  Once                  Discharge Date: 08/30/21      TCM Call (since 8/3/2021)     Date and time call was made  8/31/2021 11:17 AM    Patient was hospitialized at  [facility]        Date of Admission  08/27/21    Date of discharge  08/30/21    Diagnosis  Dental Abscess    Disposition  Home    Current Symptoms  Weakness;  Fatigue (Comment)  pain, weak and shaky     Weakness severity  Moderate    Fatigue severity  Moderate      TCM Call (since 8/3/2021)     Post hospital issues  None    Scheduled for follow up? Yes    Did you obtain your prescribed medications  Yes    Do you need help managing your prescriptions or medications  No    Is transportation to your appointment needed  Yes    Specify why  unable to drive at this time    I have advised the patient to call PCP with any new or worsening symptoms    Purvi PIERRE    Living Arrangements  Spouse or Significiant other    Support System  Spouse    The type of support provided  Emotional; Financial; Physical; Other   (comment)    Do you have social support  Yes, as much as I need            Review of Systems   Review of Systems   Constitutional: Negative for activity change, diaphoresis, fatigue and fever  HENT: Positive for dental problem (is improving)  Negative for congestion, facial swelling, hearing loss, rhinorrhea, sinus pressure, sinus pain, sneezing, sore throat and voice change  Eyes: Negative for discharge and visual disturbance  Respiratory: Negative for cough, choking, chest tightness, shortness of breath, wheezing and stridor  Cardiovascular: Negative for chest pain, palpitations and leg swelling  Gastrointestinal: Negative for abdominal distention, abdominal pain, constipation, diarrhea, nausea and vomiting  Endocrine: Negative for polydipsia, polyphagia and polyuria  Genitourinary: Negative for difficulty urinating, dysuria, frequency and urgency  Musculoskeletal: Negative for arthralgias, back pain, gait problem, joint swelling, myalgias, neck pain and neck stiffness  Skin: Negative for color change, rash and wound  Neurological: Negative for dizziness, syncope, speech difficulty, weakness, light-headedness and headaches  Hematological: Negative for adenopathy  Does not bruise/bleed easily  Psychiatric/Behavioral: Negative for agitation, behavioral problems, confusion, hallucinations, sleep disturbance and suicidal ideas  The patient is not nervous/anxious            Current Medications       Current Outpatient Medications:     acetaminophen (TYLENOL) 325 mg tablet, Take 3 tablets (975 mg total) by mouth every 8 (eight) hours, Disp: , Rfl: 0    amoxicillin (AMOXIL) 500 MG tablet, Take 1 tablet (500 mg total) by mouth 2 (two) times a day for 14 days, Disp: 28 tablet, Rfl: 0    Ascorbic Acid (LETICIA-C PO), Take 1,000 mg by mouth daily, Disp: , Rfl:     ascorbic acid (VITAMIN C) 1000 MG tablet, Take 1,000 mg by mouth daily, Disp: , Rfl:     [START ON 9/6/2021] aspirin (Aspirin 81) 81 mg EC tablet, Take 1 tablet (81 mg total) by mouth daily, Disp: , Rfl: 0    busPIRone (BUSPAR) 5 mg tablet, TAKE 1 TABLET BY MOUTH TWICE A DAY, Disp: 180 tablet, Rfl: 1    chlorhexidine (PERIDEX) 0 12 % solution, Apply 15 mL to the mouth or throat every 12 (twelve) hours for 14 days, Disp: 120 mL, Rfl: 0    FLUoxetine (PROzac) 10 mg capsule, Take 20 mg by mouth daily, Disp: , Rfl:     FLUoxetine (PROzac) 40 MG capsule, TAKE 1 CAPSULE BY MOUTH EVERY DAY, Disp: 90 capsule, Rfl: 1    ibuprofen (MOTRIN) 400 mg tablet, Take 1 tablet (400 mg total) by mouth every 8 (eight) hours as needed for mild pain for up to 7 days, Disp: 21 tablet, Rfl: 0    levothyroxine 100 mcg tablet, Take 1 tablet (100 mcg total) by mouth daily, Disp: 90 tablet, Rfl: 1    lisinopril (ZESTRIL) 20 mg tablet, Take 1 tablet (20 mg total) by mouth daily, Disp: 90 tablet, Rfl: 1    LORazepam (ATIVAN) 2 mg tablet, Take 1 tablet (2 mg total) by mouth 2 (two) times a day as needed for anxiety, Disp: 60 tablet, Rfl: 5    Melatonin 10 MG TABS, Take by mouth once at bedtime, Disp: , Rfl:     metroNIDAZOLE (FLAGYL) 500 mg tablet, Take 1 tablet (500 mg total) by mouth every 8 (eight) hours for 7 days, Disp: 24 tablet, Rfl: 0    Multiple Vitamin (MULTIVITAMIN) tablet, Take 1 tablet by mouth daily, Disp: , Rfl:     ofloxacin (FLOXIN) 0 3 % otic solution, Administer 5 drops into both ears 2 (two) times a day, Disp: 10 mL, Rfl: 1    TURMERIC PO, Take 1,000 mg by mouth daily, Disp: , Rfl:     vitamin B-12 (VITAMIN B-12) 500 mcg tablet, Take 5,000 mcg by mouth daily, Disp: , Rfl:     Vitamin D, Cholecalciferol, 50 MCG (2000 UT) CAPS, Take by mouth daily, Disp: , Rfl:     amoxicillin (AMOXIL) 500 mg capsule, TAKE 1 CAPSULE BY MOUTH TWICE A DAY FOR 2 WEEKS, Disp: , Rfl:     oxyCODONE (ROXICODONE) 5 mg immediate release tablet, Take 1 tablet (5 mg total) by mouth every 6 (six) hours as needed for severe pain for up to 5 daysMax Daily Amount: 20 mg, Disp: 20 tablet, Rfl: 0    Current Allergies     Allergies as of 09/03/2021    (No Known Allergies)            The following portions of the patient's history were reviewed and updated as appropriate: allergies, current medications, past family history, past medical history, past social history, past surgical history and problem list      Past Medical History:   Diagnosis Date    Anxiety     Cancer ([facility] [address] ) ~2009    Tonsils- followed by Oncology( Dr Alexsander Aguirre, Dr Jerome Benjamin)    Disease of thyroid gland        Past Surgical History:   Procedure Laterality Date    INCISION AND DRAINAGE OF WOUND Right 8/28/2021    Procedure: INCISION AND DRAINAGE (I&D) HEAD/FACE;  Surgeon: Lissette Lebron DMD;  [facility]: [facility];  Service: Maxillofacial    MOUTH SURGERY      MULTIPLE TOOTH EXTRACTIONS Right 8/28/2021    Procedure: EXTRACTION TEETH 25,26,27;  Surgeon: Lissette Lebron DMD;  Location[facility];  Service: [facility] [place]       Family History   Problem Relation Age of Onset    No Known Problems Mother     No Known Problems Father     Breast cancer Sister 27    No Known Problems Daughter     No Known Problems Maternal Grandmother     No Known Problems Paternal Grandmother     No Known Problems Sister     No Known Problems Sister     No Known Problems Sister     No Known Problems Maternal Aunt     No Known Problems Maternal Aunt          Medications have been verified  Objective   /76   Pulse 74   Temp 99 °F (37 2 °C) (Tympanic)   Resp 20   Ht 5' 6" (1 676 m)   Wt 69 4 kg (153 lb)   SpO2 98%   BMI 24 69 kg/m²        Physical Exam     Physical Exam  Vitals and nursing note reviewed  Constitutional:       General: She is not in acute distress  Appearance: She is well-developed  She is not diaphoretic  HENT:      Mouth/Throat:     Neck:      Thyroid: No thyromegaly  Trachea: No tracheal deviation  Cardiovascular:      Rate and Rhythm: Normal rate and regular rhythm  Heart sounds: Normal heart sounds  No murmur heard  Pulmonary:      Effort: Pulmonary effort is normal  No respiratory distress  Breath sounds: Normal breath sounds  No wheezing  Musculoskeletal:         General: No tenderness or deformity  Normal range of motion  Cervical back: Normal range of motion and neck supple  Skin:     General: Skin is warm and dry  Findings: No erythema or rash  Neurological:      Mental Status: She is alert and oriented to person, place, and time  Psychiatric:         Behavior: Behavior normal  Behavior is cooperative  Thought Content:  Thought content normal          Judgment: Judgment normal

## 2021-09-10 ENCOUNTER — OFFICE VISIT (OUTPATIENT)
Dept: WOUND CARE | Facility: CLINIC | Age: 71
End: 2021-09-10
Payer: COMMERCIAL

## 2021-09-10 ENCOUNTER — OFFICE VISIT (OUTPATIENT)
Dept: LAB | Facility: HOSPITAL | Age: 71
End: 2021-09-10
Attending: FAMILY MEDICINE
Payer: COMMERCIAL

## 2021-09-10 ENCOUNTER — TELEPHONE (OUTPATIENT)
Dept: WOUND CARE | Facility: CLINIC | Age: 71
End: 2021-09-10

## 2021-09-10 ENCOUNTER — HOSPITAL ENCOUNTER (OUTPATIENT)
Dept: RADIOLOGY | Facility: HOSPITAL | Age: 71
Discharge: HOME | End: 2021-09-10
Attending: FAMILY MEDICINE
Payer: COMMERCIAL

## 2021-09-10 VITALS
WEIGHT: 152 LBS | HEIGHT: 67 IN | HEART RATE: 69 BPM | TEMPERATURE: 97.5 F | DIASTOLIC BLOOD PRESSURE: 75 MMHG | SYSTOLIC BLOOD PRESSURE: 150 MMHG | BODY MASS INDEX: 23.86 KG/M2 | RESPIRATION RATE: 20 BRPM

## 2021-09-10 DIAGNOSIS — L59.8 OTHER SPECIFIED DISORDERS OF THE SKIN AND SUBCUTANEOUS TISSUE RELATED TO RADIATION: ICD-10-CM

## 2021-09-10 DIAGNOSIS — L59.8 OTHER SPECIFIED DISORDERS OF THE SKIN AND SUBCUTANEOUS TISSUE RELATED TO RADIATION: Primary | ICD-10-CM

## 2021-09-10 LAB
ATRIAL RATE: 60 BPM
P AXIS: 38 DEGREES
PR INTERVAL: 138 MS
QRS AXIS: -9 DEGREES
QRSD INTERVAL: 90 MS
QT INTERVAL: 444 MS
QTC INTERVAL: 444 MS
T WAVE AXIS: -13 DEGREES
VENTRICULAR RATE: 60 BPM

## 2021-09-10 PROCEDURE — 93010 ELECTROCARDIOGRAM REPORT: CPT | Performed by: INTERNAL MEDICINE

## 2021-09-10 PROCEDURE — 71046 X-RAY EXAM CHEST 2 VIEWS: CPT

## 2021-09-10 PROCEDURE — 99213 OFFICE O/P EST LOW 20 MIN: CPT | Performed by: FAMILY MEDICINE

## 2021-09-10 PROCEDURE — 99203 OFFICE O/P NEW LOW 30 MIN: CPT | Performed by: FAMILY MEDICINE

## 2021-09-10 PROCEDURE — 93005 ELECTROCARDIOGRAM TRACING: CPT

## 2021-09-10 NOTE — PROGRESS NOTES
Patient ID: Bernard Taylor is a 80 y o  female Date of Birth 1950     Chief Complaint  Chief Complaint   Patient presents with   [facility]     Patient here today for HBO eval  Patient has open wound of Jaw  Patient developed infection of jaw and had to have teeth extracted  Allergies  Patient has no known allergies  Assessment:    No problem-specific Assessment & Plan notes found for this encounter  Diagnoses and all orders for this visit:    Other specified disorders of the skin and subcutaneous tissue related to radiation  -     XR chest pa & lateral; Future  -     ECG 12 lead; Future              Procedures    Plan:    Patient with history of right tonsillar cancer noted to have pain and infection in the right mandibular region, requiring I&D of asbcess and extraction of multiple teeth in the presence of ORN  I recommend HBO treatments for STRN  CXR and EKG ordered  Will initiate treatments as soon as possible  Wound 09/10/21 Soft Tissue Necrosis Mouth Right (Active)   Wound Image Images linked 09/10/21 0952   Wound Description Edema;Pink;Slough (sutures noted) 09/10/21 0952   Caitlin-wound Assessment Edema 09/10/21 0952   Wound Length (cm) 0 5 cm 09/10/21 0952   Wound Width (cm) 0 7 cm 09/10/21 0952   Wound Depth (cm) 0 1 cm 09/10/21 0952   Wound Surface Area (cm^2) 0 35 cm^2 09/10/21 0952   Wound Volume (cm^3) 0 035 cm^3 09/10/21 0952   Calculated Wound Volume (cm^3) 0 04 cm^3 09/10/21 0952   Non-staged Wound Description Full thickness 09/10/21 0952       Wound 09/10/21 Soft Tissue Necrosis Mouth Right (Active)   Date First Assessed/Time First Assessed: 09/10/21 1007   Primary Wound Type: Soft Tissue Necrosis  Location: Mouth  Wound Location Orientation: Right       Subjective:        Patient presents for HBO consult     Patient was recently admitted from 08/27/2021 until 08/30/2021 for jaw pain and swelling, found to have dental abscess as well as focal erosive osteomyelitis of the right mandible  Patient had OR I&D, soft tissue and mandibular bone biopsy, as well as extraction of multiple teeth on 8/28/21  Patient is status post treatment of a 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  She was declared cancer-free in 2013  The following portions of the patient's history were reviewed and updated as appropriate: She  has a past medical history of Anxiety, Cancer ([facility] [address] ) (~2009), and Disease of thyroid gland  She   Patient Active Problem List    Diagnosis Date Noted    Dental abscess 08/27/2021    Essential hypertension 08/27/2021    Tympanic membrane perforation, left 07/20/2021    Mixed conductive and sensorineural hearing loss of left ear with restricted hearing of right ear 07/20/2021    Depression, recurrent ([facility] [address] ) 12/08/2020    Overweight (BMI 25 0-29 9) 02/18/2020    Impaired fasting glucose 02/18/2020    Depression with anxiety 02/18/2020    Acquired hypothyroidism 02/18/2020     She  has a past surgical history that includes Tonsillectomy; Myringoplasty; Toe Surgery; Mouth surgery; Multiple tooth extractions (Right, 8/28/2021); and Incision and drainage of wound (Right, 8/28/2021)  She  reports that she has quit smoking  Her smoking use included cigarettes  She quit after 20 00 years of use  She has never used smokeless tobacco  She reports current alcohol use  No history on file for drug use    Current Outpatient Medications   Medication Sig Dispense Refill    acetaminophen (TYLENOL) 325 mg tablet Take 3 tablets (975 mg total) by mouth every 8 (eight) hours  0    amoxicillin (AMOXIL) 500 MG tablet Take 1 tablet (500 mg total) by mouth 2 (two) times a day for 14 days 28 tablet 0    ascorbic acid (VITAMIN C) 1000 MG tablet Take 1,000 mg by mouth daily      busPIRone (BUSPAR) 5 mg tablet TAKE 1 TABLET BY MOUTH TWICE A  tablet 1    chlorhexidine (PERIDEX) 0 12 % solution Apply 15 mL to the mouth or throat every 12 (twelve) hours for 14 days 120 mL 0    FLUoxetine (PROzac) 40 MG capsule TAKE 1 CAPSULE BY MOUTH EVERY DAY 90 capsule 1    ibuprofen (MOTRIN) 400 mg tablet Take 1 tablet (400 mg total) by mouth every 8 (eight) hours as needed for mild pain for up to 7 days 21 tablet 0    levothyroxine 100 mcg tablet Take 1 tablet (100 mcg total) by mouth daily 90 tablet 1    lisinopril (ZESTRIL) 20 mg tablet Take 1 tablet (20 mg total) by mouth daily 90 tablet 1    LORazepam (ATIVAN) 2 mg tablet Take 1 tablet (2 mg total) by mouth 2 (two) times a day as needed for anxiety 60 tablet 5    Melatonin 10 MG TABS Take by mouth once at bedtime      Multiple Vitamin (MULTIVITAMIN) tablet Take 1 tablet by mouth daily      ofloxacin (FLOXIN) 0 3 % otic solution Administer 5 drops into both ears 2 (two) times a day 10 mL 1    TURMERIC PO Take 1,000 mg by mouth daily      vitamin B-12 (VITAMIN B-12) 500 mcg tablet Take 5,000 mcg by mouth daily      Vitamin D, Cholecalciferol, 50 MCG (2000 UT) CAPS Take by mouth daily      aspirin (Aspirin 81) 81 mg EC tablet Take 1 tablet (81 mg total) by mouth daily (Patient not taking: Reported on 9/10/2021)  0    FLUoxetine (PROzac) 10 mg capsule Take 20 mg by mouth daily (Patient not taking: Reported on 9/10/2021)       No current facility-administered medications for this visit  She has No Known Allergies       Review of Systems   Constitutional: Negative for chills and fever  HENT: Positive for hearing loss  Negative for congestion, postnasal drip, rhinorrhea, sinus pain, sneezing and sore throat  Respiratory: Negative for cough, chest tightness and shortness of breath  Cardiovascular: Negative for chest pain and palpitations  Musculoskeletal: Negative for gait problem  Skin: Positive for wound  Neurological: Negative for seizures  Psychiatric/Behavioral: Negative for agitation          No claustrophobia         Objective:       Wound 09/10/21 Soft Tissue Necrosis Mouth Right (Active)   Wound Image Images linked 09/10/21 0952   Wound Description Edema;Pink;Slough (sutures noted) 09/10/21 0952   Caitlin-wound Assessment Edema 09/10/21 0952   Wound Length (cm) 0 5 cm 09/10/21 0952   Wound Width (cm) 0 7 cm 09/10/21 0952   Wound Depth (cm) 0 1 cm 09/10/21 0952   Wound Surface Area (cm^2) 0 35 cm^2 09/10/21 0952   Wound Volume (cm^3) 0 035 cm^3 09/10/21 0952   Calculated Wound Volume (cm^3) 0 04 cm^3 09/10/21 0952   Non-staged Wound Description Full thickness 09/10/21 0952       /75   Pulse 69   Temp 97 5 °F (36 4 °C)   Resp 20   Ht 5' 7" (1 702 m)   Wt 68 9 kg (152 lb)   BMI 23 81 kg/m²     Physical Exam  Constitutional:       General: She is not in acute distress  Appearance: Normal appearance  She is not ill-appearing, toxic-appearing or diaphoretic  HENT:      Head: Normocephalic and atraumatic  Right Ear: Tympanic membrane, ear canal and external ear normal  There is no impacted cerumen  Left Ear: Tympanic membrane, ear canal and external ear normal  There is no impacted cerumen  Eyes:      Conjunctiva/sclera: Conjunctivae normal    Cardiovascular:      Rate and Rhythm: Normal rate and regular rhythm  Heart sounds: Normal heart sounds  No murmur heard  No friction rub  No gallop  Pulmonary:      Effort: Pulmonary effort is normal  No respiratory distress  Breath sounds: Normal breath sounds  No wheezing, rhonchi or rales  Musculoskeletal:      Cervical back: Neck supple  Neurological:      Mental Status: She is alert  Gait: Gait normal    Psychiatric:         Mood and Affect: Mood normal          Behavior: Behavior normal            HBO Qualification & Assessment     Yudy Hylton presents today for a consultation for HBO treatment      HBO Indication    Soft Tissue Radionecrosis    Soft Tissue Radionecrosis diagnosis code  L59 9 Other specified disorders of the skin and sub Q tissue related to radiation    Anatomical site Other    Date radiation treatments started 5/27/2008    Date radiation treatments completed 6/23/2008    Radiation therapy treatments ended at least 6 months previous to consideration of HBO  Yes    Symptoms of STRN the patient is experiencing Other pain, infection    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 as evidenced by above documentation HBO is medically necessary    Review of the [facility], eliza patient had cancer and received radiation  HBO is being used as an adjunct to conventional Rx  Based on the information included herein, it is my determination that the patient has a medical necessity for HBOT and meets the conditions for the adjunctive treatment to a comprehensive plan    Yes    Brief history of co-morbidities that may affect HBO treatment    Previously treated with No chemotherapy or medications which are contraindications to HBO    Patient reports s/s of No acute infections    Eyes    Patient has Myopia    Ears    Patient has a history of Other ear abnormality    Ears assessed for tympanic membrane or middle are abnormalities  yes    Patient instructed on how to clear ears and demonstrate proper technique Yes    Right ear baseline TEEDS Grade 0    Left ear baseline TEEDS Grade 0    ENT consult for Myringotomy tube insertion due to No consult is needed    Cerumen removal performed N/A  ears clear of cerumen    Neurological    Patient drummond a history of seizures No    Cardiovascular    Patient has a history of No cardiac history requiring work-up prior to hyperbaric therapy    EKG ordered Yes    Echocardiogram ordered No      Cardiovascular consult ordered No    Smoking  Social History     Tobacco Use   Smoking Status Former Smoker    Years: 20 00    Types: Cigarettes   Smokeless Tobacco Never Used       Respiratory    Patient has a history of pneumothorax No    Patient has a history of No respiratory conditions requiring further work-up prior to HBO    Lungs clear bilaterally Yes      Chest x-ray ordered Yes    Psychiatric    Patient has confinement anxiety No    Patient education and consent    I discussed with and educated the patient on the risks and benefits of HBO, different treatment options, potential adverse side effects and side effects, HBO procedure, smoking/drug/alcohol policy, and itesm not allowed in the hyperbaric chamber  Yes    Written consent for HBO obtained Yes    A trained emergency response team and ICU is available in this facility to assist with complications if required  Yes    HBO treatment goal    Osteogenesis and Angiogenesis                Wound Instructions:  Orders Placed This Encounter   Procedures    XR chest pa & lateral     Prior to HBO     Standing Status:   Future     Number of Occurrences:   1     Standing Expiration Date:   9/10/2025     Scheduling Instructions:      Bring along any outside films relating to this procedure   ECG 12 lead     Prior to HBO     Standing Status:   Future     Number of Occurrences:   1     Standing Expiration Date:   9/10/2022        Diagnosis ICD-10-CM Associated Orders   1   Other specified disorders of the skin and subcutaneous tissue related to radiation  L59 8 XR chest pa & lateral     ECG 12 lead

## 2021-09-17 LAB
ATRIAL RATE: 60 BPM
P AXIS: 35 DEGREES
PR INTERVAL: 136 MS
QRS AXIS: -6 DEGREES
QRSD INTERVAL: 90 MS
QT INTERVAL: 452 MS
QTC INTERVAL: 452 MS
T WAVE AXIS: -7 DEGREES
VENTRICULAR RATE: 60 BPM

## 2021-09-17 PROCEDURE — 93010 ELECTROCARDIOGRAM REPORT: CPT | Performed by: INTERNAL MEDICINE

## 2021-09-20 ENCOUNTER — OFFICE VISIT (OUTPATIENT)
Dept: WOUND CARE | Facility: CLINIC | Age: 71
End: 2021-09-20
Payer: COMMERCIAL

## 2021-09-20 VITALS
RESPIRATION RATE: 18 BRPM | TEMPERATURE: 97.8 F | SYSTOLIC BLOOD PRESSURE: 139 MMHG | HEART RATE: 89 BPM | DIASTOLIC BLOOD PRESSURE: 69 MMHG

## 2021-09-20 DIAGNOSIS — L59.8 OTHER SPECIFIED DISORDERS OF THE SKIN AND SUBCUTANEOUS TISSUE RELATED TO RADIATION: Primary | ICD-10-CM

## 2021-09-20 LAB — FUNGUS SPEC CULT: ABNORMAL

## 2021-09-20 PROCEDURE — 99183 HYPERBARIC OXYGEN THERAPY: CPT | Performed by: FAMILY MEDICINE

## 2021-09-20 PROCEDURE — G0277 HBOT, FULL BODY CHAMBER, 30M: HCPCS | Performed by: FAMILY MEDICINE

## 2021-09-20 NOTE — PROGRESS NOTES
HBO Treatment Course Details       Treatment Notes: Treatment tolerated well no complaints of pain or discomfort    First treatment done today  Did very well without any complications  Treatment Course Number: 1  Total Treatments Ordered:  40     Diagnosis:   1  Other specified disorders of the skin and subcutaneous tissue related to radiation  Hyperbaric oxygen [facility]    Hyperbaric oxygen the[facility]    Hyperbaric oxygen [facility]       HBO Treatment Details:  In-Patient Visit: no  Treatment Length:90 Minutes(Minutes)  Chamber #: Hard sided Monoplace Chamber    Pre-Treatment details:  Pre-treatment protocol Treatment Protocol: 2 0 MARINO X 90 minutes w/ 100% oxygen, two 5 minute air breaks  Left ear clear?: yes  Right ear clear?: yes  Left ear intact?: no (chronic perf)  Right ear intact?: yes           PE Tubes present, Right ear?: no  Left ear irrigated?: no     Left ear TEED scale: Grade 0  Right ear TEED scale: Grade 0   Pretreatment heart and lung assessment: Pretreatment heart and lung auscltation unremarkable  Patient cleared for HBOT     Treatment details:  MARINO Rate: 2  Started Compression: 1520  Reached Compression: 1531  Total Compression Time: 11 (Minutes)  Total Holding Time: 100 (Minutes)  Started Decompression: 1711  Reached Surface: 1722  Total Decompression Time: 11 (Minutes)  Total Airbreaks:   (Minutes)  Total Time of Treatment:   (Minutes)  Symptoms Noted During Treatment: None (Minutes)    Post treatment details:  Left ear clear?: yes  Right ear clear?: yes  Left ears intact?: no (Chronic perforation)  Right ears intact?: yes                    Left ear TEED scale: Grade 0  Right ear TEED scale: Grade 0  Post treatment heart and lung assessment: Post treatment heart and lung auscltation unremarkable  Patient cleared for discharge  Tolerated treatment well         Vital Signs:  HBO Glucose Reference Range: 100-350 mg/dl   Pre-Treatment Post-Treatment   Time vitals are taken: 1500 Time vitals are taken: 1723   Blood Pressure: 139/59 Blood Pressure: 158/76   Pulse: 89 Pulse: 86   Resp: 18 Resp: 18   Temp: 97 8 °F (36 6 °C) Temp: 97 °F (36 1 °C)             No Known Allergies  Patient Active Problem List    Diagnosis Date Noted    Dental abscess 08/27/2021    Essential hypertension 08/27/2021    Tympanic membrane perforation, left 07/20/2021    Mixed conductive and sensorineural hearing loss of left ear with restricted hearing of right ear 07/20/2021    Depression, recurrent ([address] ) 12/08/2020    Overweight (BMI 25 0-29 9) 02/18/2020    Impaired fasting glucose 02/18/2020    Depression with anxiety 02/18/2020    Acquired hypothyroidism 02/18/2020     Orders Placed This Encounter   Procedures    Hyperbaric oxygen [facility]py     Standing Status:   Standing     Number of Occurrences:   1     Standing Expiration Date:   12/20/2021     Order Specific Question:   MARINO Rate     Answer:   2 0 MARINO for 90 Minutes without Air Breaks; 100% oxygen     Comments:   WITH 2 five minute air breaks     Order Specific Question:   Descent and ascent rate     Answer:   Descent and ascent rate of up to 2 psi/min depending upon the patient's ability to clear ears and comfort     Order Specific Question:   Frequency     Answer:   1 x week     Order Specific Question:   Total number of treatments     Answer:   1    Hyperbaric oxygen thearpy     Standing Status:   Standing     Number of Occurrences:   29     Standing Expiration Date:   12/20/2021     Order Specific Question:   MARINO Rate     Answer:   2 5 MARINO for 90 Minutes with 2 five minute air breaks; 100% oxygen     Order Specific Question:   Frequency     Answer:   5 x week     Order Specific Question:   Total number of treatments     Answer:   30         Tomorrow, will increased to 2 5 MARINO  With two 5 minutes air breaks

## 2021-09-21 ENCOUNTER — OFFICE VISIT (OUTPATIENT)
Dept: WOUND CARE | Facility: CLINIC | Age: 71
End: 2021-09-21
Payer: COMMERCIAL

## 2021-09-21 VITALS
TEMPERATURE: 97.2 F | DIASTOLIC BLOOD PRESSURE: 64 MMHG | HEART RATE: 74 BPM | RESPIRATION RATE: 18 BRPM | SYSTOLIC BLOOD PRESSURE: 134 MMHG

## 2021-09-21 DIAGNOSIS — L59.8 OTHER SPECIFIED DISORDERS OF THE SKIN AND SUBCUTANEOUS TISSUE RELATED TO RADIATION: ICD-10-CM

## 2021-09-21 PROCEDURE — G0277 HBOT, FULL BODY CHAMBER, 30M: HCPCS | Performed by: FAMILY MEDICINE

## 2021-09-21 PROCEDURE — 99183 HYPERBARIC OXYGEN THERAPY: CPT | Performed by: FAMILY MEDICINE

## 2021-09-21 NOTE — PROGRESS NOTES
Patient will be using her ear drops in her left ear that she has already prescribed    Vital Signs:  [facility] Glucose Reference Range: 100-350 mg/dl   Pre-Treatment Post-Treatment   Time vitals are taken: 1250 Time vitals are taken: 1508   Blood Pressure: 134/64 Blood Pressure: 136/73   Pulse: 74 Pulse: 66   Resp: 18 Resp: 18   Temp: 97 2 °F (36 2 °C) Temp: 97 °F (36 1 °C)             No Known Allergies  Patient Active Problem List    Diagnosis Date Noted    Dental abscess 08/27/2021    Essential hypertension 08/27/2021    Tympanic membrane perforation, left 07/20/2021    Mixed conductive and sensorineural hearing loss of left ear with restricted hearing of right ear 07/20/2021    Depression, recurrent ([address] ) 12/08/2020    Overweight (BMI 25 0-29 9) 02/18/2020    Impaired fasting glucose 02/18/2020    Depression with anxiety 02/18/2020    Acquired hypothyroidism 02/18/2020

## 2021-09-22 ENCOUNTER — OFFICE VISIT (OUTPATIENT)
Dept: WOUND CARE | Facility: CLINIC | Age: 71
End: 2021-09-22
Payer: COMMERCIAL

## 2021-09-22 VITALS
SYSTOLIC BLOOD PRESSURE: 141 MMHG | RESPIRATION RATE: 18 BRPM | DIASTOLIC BLOOD PRESSURE: 73 MMHG | TEMPERATURE: 97.3 F | HEART RATE: 90 BPM

## 2021-09-22 DIAGNOSIS — L59.8 OTHER SPECIFIED DISORDERS OF THE SKIN AND SUBCUTANEOUS TISSUE RELATED TO RADIATION: ICD-10-CM

## 2021-09-22 PROCEDURE — 99183 HYPERBARIC OXYGEN THERAPY: CPT | Performed by: NURSE PRACTITIONER

## 2021-09-22 PROCEDURE — G0277 HBOT, FULL BODY CHAMBER, 30M: HCPCS | Performed by: NURSE PRACTITIONER

## 2021-09-22 NOTE — PROGRESS NOTES
HBO Treatment Course Details       Treatment Notes: Treatment tolerated well,  No complaints of pain or discomfort     Treatment Course Number: 3  Total Treatments Ordered:  40     Diagnosis:   1  Other specified disorders of the skin and subcutaneous tissue related to radiation  [facility]       HBO Treatment Details:  In-Patient Visit: no  Treatment Length:90 Minutes(Minutes)  Chamber #: Hard sided Monoplace Chamber    Pre-Treatment details:  Pre-treatment protocol Treatment Protocol: 2 5 MARINO X 90 minutes w/ 100% oxygen, two 5 minute air breaks  Left ear clear?: yes  Right ear clear?: yes  Left ear intact?: yes  Right ear intact?: yes                    Left ear TEED scale: Grade 0  Right ear TEED scale: Grade 0   Pretreatment heart and lung assessment: Pretreatment heart and lung auscltation unremarkable   Patient cleared for HBOT     Treatment details:  MARINO Rate: 2 5  Started Compression: 1124  Reached Compression: 1136  Total Compression Time: 12 (Minutes)  Total Holding Time: 100 (Minutes)  Started Decompression: 1316  Reached Surface: 1328  Total Decompression Time: 12 (Minutes)  Total Airbreaks: 10 (Minutes)  Total Time of Treatment: 114 (Minutes)  Symptoms Noted During Treatment: None (Minutes)    Post treatment details:                                              Vital Signs:  HBO Glucose Reference Range: 100-350 mg/dl   Pre-Treatment Post-Treatment   Time vitals are taken: 1115 Time vitals are taken: 1330   Blood Pressure: 140/73 Blood Pressure: 124/79   Pulse: 90 Pulse: 65   Resp: 18 Resp: 18   Temp: 97 3 °F (36 3 °C) Temp: 97 2 °F (36 2 °C)             No Known Allergies  Patient Active Problem List    Diagnosis Date Noted    Dental abscess 08/27/2021    Essential hypertension 08/27/2021    Tympanic membrane perforation, left 07/20/2021    Mixed conductive and sensorineural hearing loss of left ear with restricted hearing of right ear 07/20/2021    Depression, recurrent ([address] ) 12/08/2020    Overweight (BMI 25 0-29 9) 02/18/2020    Impaired fasting glucose 02/18/2020    Depression with anxiety 02/18/2020    Acquired hypothyroidism 02/18/2020     No orders of the defined types were placed in this encounter

## 2021-09-22 NOTE — PROGRESS NOTES
HBO Treatment Course Details       Treatment Notes: Patient tolerated treatment well     Treatment Course Number: 3  Total Treatments Ordered:  40     Diagnosis:   1  Other specified disorders of the skin and subcutaneous tissue related to radiation  [facility]       H[facility] Treatment Details:  In-Patient Visit: No  Treatment Length:90 Minutes(Minutes)  Chamber #: Hard sided Monoplace Chamber    Pre-Treatment details:  Pre-treatment protocol Treatment Protocol: 2 5 MARINO X 90 minutes w/ 100% oxygen, two 5 minute air breaks  Left ear clear?: yes  Right ear clear?: yes  Left ear intact?: yes  Right ear intact?: yes                    Left ear TEED scale: Grade 0  Right ear TEED scale: Grade 0   Pretreatment heart and lung assessment: Pretreatment heart and lung auscltation unremarkable  Patient cleared for HBOT     Treatment details:  MARINO Rate: 2 5  Started Compression: 1124  Reached Compression: 1136  Total Compression Time: 12 (Minutes)  Total Holding Time: 100 (Minutes)  Started Decompression: 1316  Reached Surface: 1328  Total Decompression Time: 12 (Minutes)  Total Airbreaks: 10 (Minutes)  Total Time of Treatment: 114 (Minutes)  Symptoms Noted During Treatment: None (Minutes)    Post treatment details:  Left ear clear?: yes  Right ear clear?: yes  Left ears intact?: yes  Right ears intact?: yes                    Left ear TEED scale: Grade 0  Right ear TEED scale: Grade 0  Post treatment heart and lung assessment: Post treatment heart and lung auscltation unremarkable  Patient cleared for discharge  Tolerated treatment well         Vital Signs:  HBO Glucose Reference Range: 100-350 mg/dl   Pre-Treatment Post-Treatment   Time vitals are taken: 1115 Time vitals are taken: 1330   Blood Pressure: 140/73 Blood Pressure: 124/79   Pulse: 90 Pulse: 65   Resp: 18 Resp: 18   Temp: 97 3 °F (36 3 °C) Temp: 97 2 °F (36 2 °C)             No Known Allergies  Patient Active Problem List    Diagnosis Date Noted    Dental abscess 08/27/2021    Essential hypertension 08/27/2021    Tympanic membrane perforation, left 07/20/2021    Mixed conductive and sensorineural hearing loss of left ear with restricted hearing of right ear 07/20/2021    Depression, recurrent ([address] ) 12/08/2020    Overweight (BMI 25 0-29 9) 02/18/2020    Impaired fasting glucose 02/18/2020    Depression with anxiety 02/18/2020    Acquired hypothyroidism 02/18/2020     No orders of the defined types were placed in this encounter

## 2021-09-24 ENCOUNTER — OFFICE VISIT (OUTPATIENT)
Dept: WOUND CARE | Facility: CLINIC | Age: 71
End: 2021-09-24
Payer: COMMERCIAL

## 2021-09-24 VITALS
DIASTOLIC BLOOD PRESSURE: 73 MMHG | RESPIRATION RATE: 18 BRPM | SYSTOLIC BLOOD PRESSURE: 130 MMHG | HEART RATE: 89 BPM | TEMPERATURE: 96.9 F

## 2021-09-24 DIAGNOSIS — L59.8 OTHER SPECIFIED DISORDERS OF THE SKIN AND SUBCUTANEOUS TISSUE RELATED TO RADIATION: ICD-10-CM

## 2021-09-24 PROCEDURE — G0277 HBOT, FULL BODY CHAMBER, 30M: HCPCS | Performed by: FAMILY MEDICINE

## 2021-09-24 PROCEDURE — 99183 HYPERBARIC OXYGEN THERAPY: CPT | Performed by: FAMILY MEDICINE

## 2021-09-24 NOTE — PROGRESS NOTES
HBO Treatment Course Details       Treatment Notes: Treatment tolerated well  No complaints of pain or discomfort  Treatment ended early due to appoitnment     Treatment Course Number: 4  Total Treatments Ordered:  40     Diagnosis:   1  Other specified disorders of the skin and subcutaneous tissue related to radiation  [facility]       HBO Treatment Details:  In-Patient Visit: no  Treatment Length:90 Minutes(Minutes)  Chamber #: Hard sided Monoplace Chamber    Pre-Treatment details:  Pre-treatment protocol Treatment Protocol: 2 5 MARINO X 90 minutes w/ 100% oxygen, two 5 minute air breaks  Left ear clear?: yes  Right ear clear?: yes  Left ear intact?: yes  Right ear intact?: yes                    Left ear TEED scale: Grade 0  Right ear TEED scale: Grade 0   Pretreatment heart and lung assessment: Pretreatment heart and lung auscltation unremarkable  Patient cleared for HBOT     Treatment details:  MARINO Rate: 2 5  Started Compression: 0820  Reached Compression: 0832  Total Compression Time: 12 (Minutes)  Total Holding Time: 83 (Minutes)  Started Decompression: 0955  Reached Surface: 1005  Total Decompression Time: 10 (Minutes)  Total Airbreaks: 10 (Minutes)  Total Time of Treatment: 95 (Minutes)  Symptoms Noted During Treatment: None (Minutes)    Post treatment details:        Left ears intact?: yes  Right ears intact?: yes                    Left ear TEED scale: Grade 0  Right ear TEED scale: Grade 0  Post treatment heart and lung assessment: Post treatment heart and lung auscltation unremarkable  Patient cleared for discharge  Tolerated treatment well         Vital Signs:  HBO Glucose Reference Range: 100-350 mg/dl   Pre-Treatment Post-Treatment   Time vitals are taken: 0805 Time vitals are taken: 1013   Blood Pressure: 130/73 Blood Pressure: 129/72   Pulse: 89 Pulse: 73   Resp: 18 Resp: 18   Temp: 96 9 °F (36 1 °C) Temp: 96 1 °F (35 6 °C)             No Known Allergies  Patient Active Problem List Diagnosis Date Noted    Dental abscess 08/27/2021    Essential hypertension 08/27/2021    Tympanic membrane perforation, left 07/20/2021    Mixed conductive and sensorineural hearing loss of left ear with restricted hearing of right ear 07/20/2021    Depression, recurrent ([address] ) 12/08/2020    Overweight (BMI 25 0-29 9) 02/18/2020    Impaired fasting glucose 02/18/2020    Depression with anxiety 02/18/2020    Acquired hypothyroidism 02/18/2020     No orders of the defined types were placed in this encounter

## 2021-09-27 ENCOUNTER — OFFICE VISIT (OUTPATIENT)
Dept: WOUND CARE | Facility: CLINIC | Age: 71
End: 2021-09-27
Payer: COMMERCIAL

## 2021-09-27 VITALS
DIASTOLIC BLOOD PRESSURE: 78 MMHG | RESPIRATION RATE: 18 BRPM | SYSTOLIC BLOOD PRESSURE: 151 MMHG | TEMPERATURE: 96.2 F | HEART RATE: 84 BPM

## 2021-09-27 DIAGNOSIS — L59.8 OTHER SPECIFIED DISORDERS OF THE SKIN AND SUBCUTANEOUS TISSUE RELATED TO RADIATION: ICD-10-CM

## 2021-09-27 PROCEDURE — 99183 HYPERBARIC OXYGEN THERAPY: CPT | Performed by: FAMILY MEDICINE

## 2021-09-27 PROCEDURE — G0277 HBOT, FULL BODY CHAMBER, 30M: HCPCS | Performed by: FAMILY MEDICINE

## 2021-09-27 NOTE — PROGRESS NOTES
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  [facility]       [facility] Treatment Details:  In-Patient Visit: no  Treatment Length: (Minutes)  Chamber #: Hard sided Monoplace Chamber    Pre-Treatment details:  Pre-treatment protocol Treatment Protocol: 2 5 MARINO X 90 minutes w/ 100% oxygen, two 5 minute air breaks  Left ear clear?: yes (chronic perf )  Right ear clear?: yes  Left ear intact?: no (chronic perf )  Right ear intact?: yes                    Left ear TEED scale: Grade 0  Right ear TEED scale: Grade 0   Pretreatment heart and lung assessment: Pretreatment heart and lung auscltation unremarkable  Patient cleared for HBOT     Treatment details:  MARINO Rate: 2 5  Started Compression: 0805  Reached Compression: 0817  Total Compression Time: 12 (Minutes)  Total Holding Time: 100 (Minutes)  Started Decompression: 0957  Reached Surface: 1009  Total Decompression Time: 12 (Minutes)  Total Airbreaks: 10 (Minutes)  Total Time of Treatment: 114 (Minutes)  Symptoms Noted During Treatment: None (Minutes)    Post treatment details:  Left ear clear?: yes  Right ear clear?: yes  Left ears intact?: no (chronic perforation)  Right ears intact?: yes  Left ear color: pink                 Left ear TEED scale: Grade 0  Right ear TEED scale: Grade 0  Post treatment heart and lung assessment: Post treatment heart and lung auscltation unremarkable  Patient cleared for discharge  Tolerated treatment well         Vital Signs:  HBO Glucose Reference Range: 100-350 mg/dl   Pre-Treatment Post-Treatment   Time vitals are taken: 0800 Time vitals are taken: 1015   Blood Pressure: 151/78 Blood Pressure: 134/69   Pulse: 84 Pulse: 66   Resp: 18 Resp: 18   Temp: 96 2 °F (35 7 °C) Temp: 97 1 °F (36 2 °C) No Known Allergies  Patient Active Problem List    Diagnosis Date Noted    Dental abscess 08/27/2021    Essential hypertension 08/27/2021    Tympanic membrane perforation, left 07/20/2021    Mixed conductive and sensorineural hearing loss of left ear with restricted hearing of right ear 07/20/2021    Depression, recurrent ([facility] [address] ) 12/08/2020    Overweight (BMI 25 0-29 9) 02/18/2020    Impaired fasting glucose 02/18/2020    Depression with anxiety 02/18/2020    Acquired hypothyroidism 02/18/2020

## 2021-09-28 ENCOUNTER — OFFICE VISIT (OUTPATIENT)
Dept: WOUND CARE | Facility: CLINIC | Age: 71
End: 2021-09-28
Payer: COMMERCIAL

## 2021-09-28 VITALS
HEART RATE: 85 BPM | TEMPERATURE: 97.5 F | RESPIRATION RATE: 18 BRPM | SYSTOLIC BLOOD PRESSURE: 131 MMHG | DIASTOLIC BLOOD PRESSURE: 73 MMHG

## 2021-09-28 DIAGNOSIS — L59.8 OTHER SPECIFIED DISORDERS OF THE SKIN AND SUBCUTANEOUS TISSUE RELATED TO RADIATION: ICD-10-CM

## 2021-09-28 PROCEDURE — 99183 HYPERBARIC OXYGEN THERAPY: CPT | Performed by: FAMILY MEDICINE

## 2021-09-28 PROCEDURE — G0277 HBOT, FULL BODY CHAMBER, 30M: HCPCS | Performed by: FAMILY MEDICINE

## 2021-09-28 NOTE — PROGRESS NOTES
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  [facility] [place]       [facility] Treatment Details:  In-Patient Visit: no  Treatment Length:90 Minutes(Minutes)  Chamber #: Hard sided Monoplace Chamber    Pre-Treatment details:  Pre-treatment protocol Treatment Protocol: 2 5 MARINO X 90 minutes w/ 100% oxygen, two 5 minute air breaks  Left ear clear?: yes  Right ear clear?: yes  Left ear intact?: no (Chronic perforation)  Right ear intact?: yes                    Left ear TEED scale: Grade 0  Right ear TEED scale: Grade 0   Pretreatment heart and lung assessment: Pretreatment heart and lung auscltation unremarkable  Patient cleared for HBOT     Treatment details:  MARINO Rate: 2 5  Started Compression: 0810  Reached Compression: 0822  Total Compression Time: 12 (Minutes)  Total Holding Time: 100 (Minutes)  Started Decompression: 1002  Reached Surface: 6041  Total Decompression Time: 12 (Minutes)  Total Airbreaks: 10 (Minutes)  Total Time of Treatment: 114 (Minutes)  Symptoms Noted During Treatment: None (Minutes)    Post treatment details:  Left ear clear?: yes  Right ear clear?: yes  Left ears intact?: no  Right ears intact?: yes                    Left ear TEED scale: Grade 0  Right ear TEED scale: Grade 0  Post treatment heart and lung assessment: Post treatment heart and lung auscltation unremarkable  Patient cleared for discharge  Tolerated treatment well         Vital Signs:  HBO Glucose Reference Range: 100-350 mg/dl   Pre-Treatment Post-Treatment   Time vitals are taken: 0800 Time vitals are taken: 1020   Blood Pressure: 131/73 Blood Pressure: 150/70   Pulse: 85 Pulse: 82   Resp: 18 Resp: 18   Temp: 97 5 °F (36 4 °C) Temp: 97 3 °F (36 3 °C)             No Known Allergies  Patient Active Problem List    Diagnosis Date Noted    Dental abscess 08/27/2021    Essential hypertension 08/27/2021    Tympanic membrane perforation, left 07/20/2021    Mixed conductive and sensorineural hearing loss of left ear with restricted hearing of right ear 07/20/2021    Depression, recurrent ([address] ) 12/08/2020    Overweight (BMI 25 0-29 9) 02/18/2020    Impaired fasting glucose 02/18/2020    Depression with anxiety 02/18/2020    Acquired hypothyroidism 02/18/2020     No orders of the defined types were placed in this encounter

## 2021-09-29 ENCOUNTER — OFFICE VISIT (OUTPATIENT)
Dept: WOUND CARE | Facility: CLINIC | Age: 71
End: 2021-09-29
Payer: COMMERCIAL

## 2021-09-29 VITALS
TEMPERATURE: 96.7 F | DIASTOLIC BLOOD PRESSURE: 73 MMHG | RESPIRATION RATE: 18 BRPM | SYSTOLIC BLOOD PRESSURE: 152 MMHG | HEART RATE: 83 BPM

## 2021-09-29 DIAGNOSIS — L59.8 OTHER SPECIFIED DISORDERS OF THE SKIN AND SUBCUTANEOUS TISSUE RELATED TO RADIATION: Primary | ICD-10-CM

## 2021-09-29 PROCEDURE — 99183 HYPERBARIC OXYGEN THERAPY: CPT | Performed by: NURSE PRACTITIONER

## 2021-09-29 PROCEDURE — G0277 HBOT, FULL BODY CHAMBER, 30M: HCPCS | Performed by: NURSE PRACTITIONER

## 2021-09-29 NOTE — PROGRESS NOTES
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  [facility]       [facility] Treatment Details:  In-Patient Visit: no  Treatment Length:90 Minutes(Minutes)  Chamber #: Hard sided Monoplace Chamber    Pre-Treatment details:  Pre-treatment protocol Treatment Protocol: 2 5 MARINO X 90 minutes w/ 100% oxygen, two 5 minute air breaks  Left ear clear?: yes  Right ear clear?: yes     Right ear intact?: yes                    Left ear TEED scale: Grade 0  Right ear TEED scale: Grade 0   Pretreatment heart and lung assessment: Pretreatment heart and lung auscltation unremarkable   Patient cleared for HBOT     Treatment details:  MARINO Rate: 2 5  Started Compression: 0804  Reached Compression: 0816  Total Compression Time: 12 (Minutes)  Total Holding Time: 100 (Minutes)  Started Decompression: 0956  Reached Surface: 1008  Total Decompression Time: 12 (Minutes)  Total Airbreaks: 10 (Minutes)  Total Time of Treatment: 114 (Minutes)  Symptoms Noted During Treatment: None (Minutes)    Post treatment details:                                              Vital Signs:  HBO Glucose Reference Range: 100-350 mg/dl   Pre-Treatment Post-Treatment   Time vitals are taken: 0800 Time vitals are taken: 1015   Blood Pressure: 152/73 Blood Pressure: 147/77   Pulse: 83 Pulse: 70   Resp: 18 Resp: 18   Temp: 96 7 °F (35 9 °C) Temp: 96 5 °F (35 8 °C)             No Known Allergies  Patient Active Problem List    Diagnosis Date Noted    Dental abscess 08/27/2021    Essential hypertension 08/27/2021    Tympanic membrane perforation, left 07/20/2021    Mixed conductive and sensorineural hearing loss of left ear with restricted hearing of right ear 07/20/2021    Depression, recurrent ([address] ) 12/08/2020    Overweight (BMI 25 0-29 9) 02/18/2020    Impaired fasting glucose 02/18/2020    Depression with anxiety 02/18/2020    Acquired hypothyroidism 02/18/2020     No orders of the defined types were placed in this encounter

## 2021-09-29 NOTE — PROGRESS NOTES
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  [facility]       [facility] Treatment Details:  In-Patient Visit: No  Treatment Length:90 Minutes(Minutes)  Chamber #: Hard sided Monoplace Chamber    Pre-Treatment details:  Pre-treatment protocol Treatment Protocol: 2 5 MARINO X 90 minutes w/ 100% oxygen, two 5 minute air breaks  Left ear clear?: yes  Right ear clear?: yes     Right ear intact?: yes                    Left ear TEED scale: Grade 0  Right ear TEED scale: Grade 0   Pretreatment heart and lung assessment: Pretreatment heart and lung auscltation unremarkable  Patient cleared for HBOT     Treatment details:  MARINO Rate: 2 5  Started Compression: 0804  Reached Compression: 0816  Total Compression Time: 12 (Minutes)  Total Holding Time: 100 (Minutes)  Started Decompression: 0956  Reached Surface: 1008  Total Decompression Time: 12 (Minutes)  Total Airbreaks: 10 (Minutes)  Total Time of Treatment: 114 (Minutes)  Symptoms Noted During Treatment: None (Minutes)    Post treatment details:  Left ear clear?: yes  Right ear clear?: yes  Left ears intact?: yes  Right ears intact?: yes                    Left ear TEED scale: Grade 0  Right ear TEED scale: Grade 0  Post treatment heart and lung assessment: Post treatment heart and lung auscltation unremarkable  Patient cleared for discharge  Tolerated treatment well         Vital Signs:  HBO Glucose Reference Range: 100-350 mg/dl   Pre-Treatment Post-Treatment   Time vitals are taken: 0800 Time vitals are taken: 1015   Blood Pressure: 152/73 Blood Pressure: 147/77   Pulse: 83 Pulse: 70   Resp: 18 Resp: 18   Temp: 96 7 °F (35 9 °C) Temp: 96 5 °F (35 8 °C)             No Known Allergies  Patient Active Problem List    Diagnosis Date Noted    Dental abscess 08/27/2021  Essential hypertension 08/27/2021    Tympanic membrane perforation, left 07/20/2021    Mixed conductive and sensorineural hearing loss of left ear with restricted hearing of right ear 07/20/2021    Depression, recurrent ([address] ) 12/08/2020    Overweight (BMI 25 0-29 9) 02/18/2020    Impaired fasting glucose 02/18/2020    Depression with anxiety 02/18/2020    Acquired hypothyroidism 02/18/2020     No orders of the defined types were placed in this encounter

## 2021-10-01 ENCOUNTER — OFFICE VISIT (OUTPATIENT)
Dept: WOUND CARE | Facility: CLINIC | Age: 71
End: 2021-10-01
Payer: COMMERCIAL

## 2021-10-01 VITALS
DIASTOLIC BLOOD PRESSURE: 74 MMHG | RESPIRATION RATE: 18 BRPM | SYSTOLIC BLOOD PRESSURE: 148 MMHG | HEART RATE: 75 BPM | TEMPERATURE: 96.6 F

## 2021-10-01 DIAGNOSIS — L59.8 OTHER SPECIFIED DISORDERS OF THE SKIN AND SUBCUTANEOUS TISSUE RELATED TO RADIATION: ICD-10-CM

## 2021-10-01 PROCEDURE — G0277 HBOT, FULL BODY CHAMBER, 30M: HCPCS | Performed by: FAMILY MEDICINE

## 2021-10-01 PROCEDURE — 99183 HYPERBARIC OXYGEN THERAPY: CPT | Performed by: FAMILY MEDICINE

## 2021-10-04 ENCOUNTER — OFFICE VISIT (OUTPATIENT)
Dept: WOUND CARE | Facility: CLINIC | Age: 71
End: 2021-10-04
Payer: COMMERCIAL

## 2021-10-04 VITALS
TEMPERATURE: 96.5 F | HEART RATE: 74 BPM | RESPIRATION RATE: 18 BRPM | SYSTOLIC BLOOD PRESSURE: 130 MMHG | DIASTOLIC BLOOD PRESSURE: 79 MMHG

## 2021-10-04 DIAGNOSIS — L59.8 OTHER SPECIFIED DISORDERS OF THE SKIN AND SUBCUTANEOUS TISSUE RELATED TO RADIATION: ICD-10-CM

## 2021-10-04 PROCEDURE — 99183 HYPERBARIC OXYGEN THERAPY: CPT | Performed by: FAMILY MEDICINE

## 2021-10-04 PROCEDURE — G0277 HBOT, FULL BODY CHAMBER, 30M: HCPCS | Performed by: FAMILY MEDICINE

## 2021-10-05 ENCOUNTER — OFFICE VISIT (OUTPATIENT)
Dept: WOUND CARE | Facility: CLINIC | Age: 71
End: 2021-10-05
Payer: COMMERCIAL

## 2021-10-05 VITALS
SYSTOLIC BLOOD PRESSURE: 142 MMHG | TEMPERATURE: 96.5 F | HEART RATE: 80 BPM | DIASTOLIC BLOOD PRESSURE: 77 MMHG | RESPIRATION RATE: 18 BRPM

## 2021-10-05 DIAGNOSIS — L59.8 OTHER SPECIFIED DISORDERS OF THE SKIN AND SUBCUTANEOUS TISSUE RELATED TO RADIATION: ICD-10-CM

## 2021-10-05 PROCEDURE — 99183 HYPERBARIC OXYGEN THERAPY: CPT | Performed by: FAMILY MEDICINE

## 2021-10-05 PROCEDURE — G0277 HBOT, FULL BODY CHAMBER, 30M: HCPCS | Performed by: FAMILY MEDICINE

## 2021-10-06 ENCOUNTER — OFFICE VISIT (OUTPATIENT)
Dept: WOUND CARE | Facility: CLINIC | Age: 71
End: 2021-10-06
Payer: COMMERCIAL

## 2021-10-06 VITALS
SYSTOLIC BLOOD PRESSURE: 165 MMHG | DIASTOLIC BLOOD PRESSURE: 76 MMHG | RESPIRATION RATE: 20 BRPM | HEART RATE: 73 BPM | TEMPERATURE: 96.3 F

## 2021-10-06 DIAGNOSIS — L59.8 OTHER SPECIFIED DISORDERS OF THE SKIN AND SUBCUTANEOUS TISSUE RELATED TO RADIATION: Primary | ICD-10-CM

## 2021-10-06 PROCEDURE — 99183 HYPERBARIC OXYGEN THERAPY: CPT | Performed by: NURSE PRACTITIONER

## 2021-10-06 PROCEDURE — G0277 HBOT, FULL BODY CHAMBER, 30M: HCPCS | Performed by: NURSE PRACTITIONER

## 2021-10-08 ENCOUNTER — OFFICE VISIT (OUTPATIENT)
Dept: WOUND CARE | Facility: CLINIC | Age: 71
End: 2021-10-08
Payer: COMMERCIAL

## 2021-10-08 VITALS
DIASTOLIC BLOOD PRESSURE: 68 MMHG | SYSTOLIC BLOOD PRESSURE: 148 MMHG | RESPIRATION RATE: 18 BRPM | TEMPERATURE: 97.7 F | HEART RATE: 65 BPM

## 2021-10-08 DIAGNOSIS — L59.8 OTHER SPECIFIED DISORDERS OF THE SKIN AND SUBCUTANEOUS TISSUE RELATED TO RADIATION: ICD-10-CM

## 2021-10-08 PROCEDURE — G0277 HBOT, FULL BODY CHAMBER, 30M: HCPCS | Performed by: FAMILY MEDICINE

## 2021-10-08 PROCEDURE — 99183 HYPERBARIC OXYGEN THERAPY: CPT | Performed by: FAMILY MEDICINE

## 2021-10-11 ENCOUNTER — OFFICE VISIT (OUTPATIENT)
Dept: WOUND CARE | Facility: CLINIC | Age: 71
End: 2021-10-11
Payer: COMMERCIAL

## 2021-10-11 VITALS
TEMPERATURE: 98 F | HEART RATE: 85 BPM | DIASTOLIC BLOOD PRESSURE: 71 MMHG | SYSTOLIC BLOOD PRESSURE: 155 MMHG | RESPIRATION RATE: 18 BRPM

## 2021-10-11 DIAGNOSIS — L59.8 OTHER SPECIFIED DISORDERS OF THE SKIN AND SUBCUTANEOUS TISSUE RELATED TO RADIATION: ICD-10-CM

## 2021-10-11 PROCEDURE — 99183 HYPERBARIC OXYGEN THERAPY: CPT | Performed by: FAMILY MEDICINE

## 2021-10-11 PROCEDURE — G0277 HBOT, FULL BODY CHAMBER, 30M: HCPCS | Performed by: FAMILY MEDICINE

## 2021-10-12 ENCOUNTER — OFFICE VISIT (OUTPATIENT)
Dept: WOUND CARE | Facility: CLINIC | Age: 71
End: 2021-10-12
Payer: COMMERCIAL

## 2021-10-12 VITALS
HEART RATE: 66 BPM | TEMPERATURE: 97.8 F | DIASTOLIC BLOOD PRESSURE: 78 MMHG | RESPIRATION RATE: 18 BRPM | SYSTOLIC BLOOD PRESSURE: 150 MMHG

## 2021-10-12 DIAGNOSIS — L59.8 OTHER SPECIFIED DISORDERS OF THE SKIN AND SUBCUTANEOUS TISSUE RELATED TO RADIATION: ICD-10-CM

## 2021-10-12 LAB
MYCOBACTERIUM SPEC CULT: NORMAL
RHODAMINE-AURAMINE STN SPEC: NORMAL

## 2021-10-12 PROCEDURE — G0277 HBOT, FULL BODY CHAMBER, 30M: HCPCS | Performed by: FAMILY MEDICINE

## 2021-10-12 PROCEDURE — 99183 HYPERBARIC OXYGEN THERAPY: CPT | Performed by: FAMILY MEDICINE

## 2021-10-15 ENCOUNTER — OFFICE VISIT (OUTPATIENT)
Dept: WOUND CARE | Facility: CLINIC | Age: 71
End: 2021-10-15
Payer: COMMERCIAL

## 2021-10-15 VITALS
TEMPERATURE: 98.3 F | RESPIRATION RATE: 18 BRPM | SYSTOLIC BLOOD PRESSURE: 141 MMHG | HEART RATE: 70 BPM | DIASTOLIC BLOOD PRESSURE: 79 MMHG

## 2021-10-15 DIAGNOSIS — L59.8 OTHER SPECIFIED DISORDERS OF THE SKIN AND SUBCUTANEOUS TISSUE RELATED TO RADIATION: ICD-10-CM

## 2021-10-15 PROCEDURE — 99183 HYPERBARIC OXYGEN THERAPY: CPT | Performed by: FAMILY MEDICINE

## 2021-10-15 PROCEDURE — G0277 HBOT, FULL BODY CHAMBER, 30M: HCPCS | Performed by: FAMILY MEDICINE

## 2021-10-18 ENCOUNTER — OFFICE VISIT (OUTPATIENT)
Dept: WOUND CARE | Facility: CLINIC | Age: 71
End: 2021-10-18
Payer: COMMERCIAL

## 2021-10-18 VITALS
SYSTOLIC BLOOD PRESSURE: 147 MMHG | DIASTOLIC BLOOD PRESSURE: 84 MMHG | RESPIRATION RATE: 18 BRPM | HEART RATE: 78 BPM | TEMPERATURE: 97.8 F

## 2021-10-18 DIAGNOSIS — L59.8 OTHER SPECIFIED DISORDERS OF THE SKIN AND SUBCUTANEOUS TISSUE RELATED TO RADIATION: ICD-10-CM

## 2021-10-18 PROCEDURE — G0277 HBOT, FULL BODY CHAMBER, 30M: HCPCS | Performed by: FAMILY MEDICINE

## 2021-10-18 PROCEDURE — 99183 HYPERBARIC OXYGEN THERAPY: CPT | Performed by: FAMILY MEDICINE

## 2021-10-19 ENCOUNTER — OFFICE VISIT (OUTPATIENT)
Dept: WOUND CARE | Facility: CLINIC | Age: 71
End: 2021-10-19
Payer: COMMERCIAL

## 2021-10-19 VITALS
RESPIRATION RATE: 18 BRPM | SYSTOLIC BLOOD PRESSURE: 141 MMHG | TEMPERATURE: 97.8 F | DIASTOLIC BLOOD PRESSURE: 76 MMHG | HEART RATE: 73 BPM

## 2021-10-19 DIAGNOSIS — L59.8 OTHER SPECIFIED DISORDERS OF THE SKIN AND SUBCUTANEOUS TISSUE RELATED TO RADIATION: ICD-10-CM

## 2021-10-19 PROCEDURE — G0277 HBOT, FULL BODY CHAMBER, 30M: HCPCS | Performed by: FAMILY MEDICINE

## 2021-10-19 PROCEDURE — 99183 HYPERBARIC OXYGEN THERAPY: CPT | Performed by: FAMILY MEDICINE

## 2021-10-20 ENCOUNTER — OFFICE VISIT (OUTPATIENT)
Dept: WOUND CARE | Facility: CLINIC | Age: 71
End: 2021-10-20
Payer: COMMERCIAL

## 2021-10-20 VITALS
HEART RATE: 72 BPM | TEMPERATURE: 97.7 F | RESPIRATION RATE: 18 BRPM | SYSTOLIC BLOOD PRESSURE: 125 MMHG | DIASTOLIC BLOOD PRESSURE: 66 MMHG

## 2021-10-20 DIAGNOSIS — L59.8 OTHER SPECIFIED DISORDERS OF THE SKIN AND SUBCUTANEOUS TISSUE RELATED TO RADIATION: ICD-10-CM

## 2021-10-20 PROCEDURE — 99183 HYPERBARIC OXYGEN THERAPY: CPT | Performed by: NURSE PRACTITIONER

## 2021-10-20 PROCEDURE — G0277 HBOT, FULL BODY CHAMBER, 30M: HCPCS | Performed by: NURSE PRACTITIONER

## 2021-10-21 ENCOUNTER — OFFICE VISIT (OUTPATIENT)
Dept: WOUND CARE | Facility: CLINIC | Age: 71
End: 2021-10-21
Payer: COMMERCIAL

## 2021-10-21 VITALS
RESPIRATION RATE: 18 BRPM | DIASTOLIC BLOOD PRESSURE: 83 MMHG | HEART RATE: 75 BPM | SYSTOLIC BLOOD PRESSURE: 152 MMHG | TEMPERATURE: 96.8 F

## 2021-10-21 DIAGNOSIS — L59.8 OTHER SPECIFIED DISORDERS OF THE SKIN AND SUBCUTANEOUS TISSUE RELATED TO RADIATION: ICD-10-CM

## 2021-10-21 PROCEDURE — 99183 HYPERBARIC OXYGEN THERAPY: CPT | Performed by: SPECIALIST

## 2021-10-21 PROCEDURE — G0277 HBOT, FULL BODY CHAMBER, 30M: HCPCS | Performed by: SPECIALIST

## 2021-10-22 ENCOUNTER — OFFICE VISIT (OUTPATIENT)
Dept: WOUND CARE | Facility: CLINIC | Age: 71
End: 2021-10-22
Payer: COMMERCIAL

## 2021-10-22 VITALS
TEMPERATURE: 96.8 F | DIASTOLIC BLOOD PRESSURE: 64 MMHG | RESPIRATION RATE: 18 BRPM | HEART RATE: 78 BPM | SYSTOLIC BLOOD PRESSURE: 128 MMHG

## 2021-10-22 DIAGNOSIS — L59.8 OTHER SPECIFIED DISORDERS OF THE SKIN AND SUBCUTANEOUS TISSUE RELATED TO RADIATION: ICD-10-CM

## 2021-10-22 PROCEDURE — G0277 HBOT, FULL BODY CHAMBER, 30M: HCPCS | Performed by: FAMILY MEDICINE

## 2021-10-22 PROCEDURE — 99183 HYPERBARIC OXYGEN THERAPY: CPT | Performed by: FAMILY MEDICINE

## 2021-10-25 ENCOUNTER — OFFICE VISIT (OUTPATIENT)
Dept: WOUND CARE | Facility: CLINIC | Age: 71
End: 2021-10-25
Payer: COMMERCIAL

## 2021-10-25 VITALS
RESPIRATION RATE: 18 BRPM | TEMPERATURE: 96.9 F | HEART RATE: 89 BPM | SYSTOLIC BLOOD PRESSURE: 136 MMHG | DIASTOLIC BLOOD PRESSURE: 75 MMHG

## 2021-10-25 DIAGNOSIS — L59.8 OTHER SPECIFIED DISORDERS OF THE SKIN AND SUBCUTANEOUS TISSUE RELATED TO RADIATION: ICD-10-CM

## 2021-10-25 PROCEDURE — 99183 HYPERBARIC OXYGEN THERAPY: CPT | Performed by: FAMILY MEDICINE

## 2021-10-25 PROCEDURE — G0277 HBOT, FULL BODY CHAMBER, 30M: HCPCS | Performed by: FAMILY MEDICINE

## 2021-10-26 ENCOUNTER — OFFICE VISIT (OUTPATIENT)
Dept: WOUND CARE | Facility: CLINIC | Age: 71
End: 2021-10-26
Payer: COMMERCIAL

## 2021-10-26 VITALS
TEMPERATURE: 96.5 F | RESPIRATION RATE: 18 BRPM | SYSTOLIC BLOOD PRESSURE: 155 MMHG | DIASTOLIC BLOOD PRESSURE: 82 MMHG | HEART RATE: 76 BPM

## 2021-10-26 DIAGNOSIS — L59.8 OTHER SPECIFIED DISORDERS OF THE SKIN AND SUBCUTANEOUS TISSUE RELATED TO RADIATION: ICD-10-CM

## 2021-10-26 PROCEDURE — 99183 HYPERBARIC OXYGEN THERAPY: CPT | Performed by: FAMILY MEDICINE

## 2021-10-26 PROCEDURE — G0277 HBOT, FULL BODY CHAMBER, 30M: HCPCS | Performed by: FAMILY MEDICINE

## 2021-10-27 ENCOUNTER — OFFICE VISIT (OUTPATIENT)
Dept: WOUND CARE | Facility: CLINIC | Age: 71
End: 2021-10-27
Payer: COMMERCIAL

## 2021-10-27 VITALS
RESPIRATION RATE: 18 BRPM | DIASTOLIC BLOOD PRESSURE: 74 MMHG | TEMPERATURE: 97.6 F | HEART RATE: 75 BPM | SYSTOLIC BLOOD PRESSURE: 140 MMHG

## 2021-10-27 DIAGNOSIS — L59.8 OTHER SPECIFIED DISORDERS OF THE SKIN AND SUBCUTANEOUS TISSUE RELATED TO RADIATION: Primary | ICD-10-CM

## 2021-10-27 PROCEDURE — 99183 HYPERBARIC OXYGEN THERAPY: CPT | Performed by: NURSE PRACTITIONER

## 2021-10-27 PROCEDURE — G0277 HBOT, FULL BODY CHAMBER, 30M: HCPCS | Performed by: NURSE PRACTITIONER

## 2021-10-29 ENCOUNTER — OFFICE VISIT (OUTPATIENT)
Dept: WOUND CARE | Facility: CLINIC | Age: 71
End: 2021-10-29
Payer: COMMERCIAL

## 2021-10-29 VITALS
HEART RATE: 75 BPM | SYSTOLIC BLOOD PRESSURE: 136 MMHG | DIASTOLIC BLOOD PRESSURE: 76 MMHG | TEMPERATURE: 97.6 F | RESPIRATION RATE: 18 BRPM

## 2021-10-29 DIAGNOSIS — L59.8 OTHER SPECIFIED DISORDERS OF THE SKIN AND SUBCUTANEOUS TISSUE RELATED TO RADIATION: ICD-10-CM

## 2021-10-29 PROCEDURE — 99183 HYPERBARIC OXYGEN THERAPY: CPT | Performed by: SPECIALIST

## 2021-10-29 PROCEDURE — G0277 HBOT, FULL BODY CHAMBER, 30M: HCPCS | Performed by: SPECIALIST

## 2021-11-10 ENCOUNTER — TELEPHONE (OUTPATIENT)
Dept: FAMILY MEDICINE CLINIC | Facility: CLINIC | Age: 71
End: 2021-11-10

## 2021-11-10 ENCOUNTER — APPOINTMENT (OUTPATIENT)
Dept: LAB | Facility: CLINIC | Age: 71
End: 2021-11-10
Payer: COMMERCIAL

## 2021-11-10 DIAGNOSIS — E03.9 ACQUIRED HYPOTHYROIDISM: ICD-10-CM

## 2021-11-10 DIAGNOSIS — Z13.220 SCREENING CHOLESTEROL LEVEL: ICD-10-CM

## 2021-11-10 DIAGNOSIS — E55.9 VITAMIN D DEFICIENCY: ICD-10-CM

## 2021-11-10 DIAGNOSIS — I10 HYPERTENSION, UNSPECIFIED TYPE: ICD-10-CM

## 2021-11-10 LAB
25(OH)D3 SERPL-MCNC: 57.3 NG/ML (ref 30–100)
ALBUMIN SERPL BCP-MCNC: 3.7 G/DL (ref 3.5–5)
ALP SERPL-CCNC: 88 U/L (ref 46–116)
ALT SERPL W P-5'-P-CCNC: 20 U/L (ref 12–78)
ANION GAP SERPL CALCULATED.3IONS-SCNC: 7 MMOL/L (ref 4–13)
AST SERPL W P-5'-P-CCNC: 18 U/L (ref 5–45)
BILIRUB SERPL-MCNC: 0.45 MG/DL (ref 0.2–1)
BUN SERPL-MCNC: 20 MG/DL (ref 5–25)
CALCIUM SERPL-MCNC: 9 MG/DL (ref 8.3–10.1)
CHLORIDE SERPL-SCNC: 105 MMOL/L (ref 100–108)
CHOLEST SERPL-MCNC: 202 MG/DL (ref 50–200)
CO2 SERPL-SCNC: 29 MMOL/L (ref 21–32)
CREAT SERPL-MCNC: 0.95 MG/DL (ref 0.6–1.3)
GFR SERPL CREATININE-BSD FRML MDRD: 61 ML/MIN/1.73SQ M
GLUCOSE P FAST SERPL-MCNC: 90 MG/DL (ref 65–99)
HDLC SERPL-MCNC: 61 MG/DL
LDLC SERPL CALC-MCNC: 130 MG/DL (ref 0–100)
NONHDLC SERPL-MCNC: 141 MG/DL
POTASSIUM SERPL-SCNC: 4.4 MMOL/L (ref 3.5–5.3)
PROT SERPL-MCNC: 7.3 G/DL (ref 6.4–8.2)
SODIUM SERPL-SCNC: 141 MMOL/L (ref 136–145)
TRIGL SERPL-MCNC: 56 MG/DL
TSH SERPL DL<=0.05 MIU/L-ACNC: 0.63 UIU/ML (ref 0.36–3.74)

## 2021-11-10 PROCEDURE — 80061 LIPID PANEL: CPT

## 2021-11-10 PROCEDURE — 36415 COLL VENOUS BLD VENIPUNCTURE: CPT

## 2021-11-10 PROCEDURE — 82306 VITAMIN D 25 HYDROXY: CPT

## 2021-11-10 PROCEDURE — 80053 COMPREHEN METABOLIC PANEL: CPT

## 2021-11-10 PROCEDURE — 84443 ASSAY THYROID STIM HORMONE: CPT

## 2021-11-15 ENCOUNTER — OFFICE VISIT (OUTPATIENT)
Dept: FAMILY MEDICINE CLINIC | Facility: CLINIC | Age: 71
End: 2021-11-15
Payer: COMMERCIAL

## 2021-11-15 VITALS
WEIGHT: 153 LBS | OXYGEN SATURATION: 98 % | HEART RATE: 78 BPM | TEMPERATURE: 98.7 F | DIASTOLIC BLOOD PRESSURE: 72 MMHG | RESPIRATION RATE: 20 BRPM | HEIGHT: 68 IN | SYSTOLIC BLOOD PRESSURE: 118 MMHG | BODY MASS INDEX: 23.19 KG/M2

## 2021-11-15 DIAGNOSIS — I10 ESSENTIAL HYPERTENSION: Primary | ICD-10-CM

## 2021-11-15 DIAGNOSIS — Z23 ENCOUNTER FOR IMMUNIZATION: ICD-10-CM

## 2021-11-15 DIAGNOSIS — F33.9 DEPRESSION, RECURRENT (HCC): ICD-10-CM

## 2021-11-15 DIAGNOSIS — E03.9 ACQUIRED HYPOTHYROIDISM: ICD-10-CM

## 2021-11-15 DIAGNOSIS — F41.9 ANXIETY: ICD-10-CM

## 2021-11-15 PROCEDURE — 90662 IIV NO PRSV INCREASED AG IM: CPT

## 2021-11-15 PROCEDURE — 3008F BODY MASS INDEX DOCD: CPT | Performed by: NURSE PRACTITIONER

## 2021-11-15 PROCEDURE — 3725F SCREEN DEPRESSION PERFORMED: CPT | Performed by: NURSE PRACTITIONER

## 2021-11-15 PROCEDURE — 99214 OFFICE O/P EST MOD 30 MIN: CPT | Performed by: NURSE PRACTITIONER

## 2021-11-15 PROCEDURE — G0008 ADMIN INFLUENZA VIRUS VAC: HCPCS

## 2021-11-15 PROCEDURE — 1160F RVW MEDS BY RX/DR IN RCRD: CPT | Performed by: NURSE PRACTITIONER

## 2021-11-15 PROCEDURE — 1036F TOBACCO NON-USER: CPT | Performed by: NURSE PRACTITIONER

## 2021-11-18 ENCOUNTER — OFFICE VISIT (OUTPATIENT)
Dept: WOUND CARE | Facility: CLINIC | Age: 71
End: 2021-11-18
Payer: COMMERCIAL

## 2021-11-18 DIAGNOSIS — L59.8 OTHER SPECIFIED DISORDERS OF THE SKIN AND SUBCUTANEOUS TISSUE RELATED TO RADIATION: ICD-10-CM

## 2021-11-18 PROCEDURE — G0277 HBOT, FULL BODY CHAMBER, 30M: HCPCS | Performed by: SPECIALIST

## 2021-11-18 PROCEDURE — 99183 HYPERBARIC OXYGEN THERAPY: CPT | Performed by: SPECIALIST

## 2021-11-19 ENCOUNTER — OFFICE VISIT (OUTPATIENT)
Dept: WOUND CARE | Facility: CLINIC | Age: 71
End: 2021-11-19
Payer: COMMERCIAL

## 2021-11-19 VITALS
SYSTOLIC BLOOD PRESSURE: 150 MMHG | HEART RATE: 76 BPM | TEMPERATURE: 96.3 F | RESPIRATION RATE: 18 BRPM | DIASTOLIC BLOOD PRESSURE: 76 MMHG

## 2021-11-19 DIAGNOSIS — L59.8 OTHER SPECIFIED DISORDERS OF THE SKIN AND SUBCUTANEOUS TISSUE RELATED TO RADIATION: ICD-10-CM

## 2021-11-19 PROCEDURE — 99183 HYPERBARIC OXYGEN THERAPY: CPT | Performed by: FAMILY MEDICINE

## 2021-11-19 PROCEDURE — G0277 HBOT, FULL BODY CHAMBER, 30M: HCPCS | Performed by: FAMILY MEDICINE

## 2021-11-22 ENCOUNTER — OFFICE VISIT (OUTPATIENT)
Dept: WOUND CARE | Facility: CLINIC | Age: 71
End: 2021-11-22
Payer: COMMERCIAL

## 2021-11-22 VITALS
HEART RATE: 85 BPM | DIASTOLIC BLOOD PRESSURE: 77 MMHG | TEMPERATURE: 96.9 F | RESPIRATION RATE: 18 BRPM | SYSTOLIC BLOOD PRESSURE: 138 MMHG

## 2021-11-22 DIAGNOSIS — L59.8 OTHER SPECIFIED DISORDERS OF THE SKIN AND SUBCUTANEOUS TISSUE RELATED TO RADIATION: ICD-10-CM

## 2021-11-22 DIAGNOSIS — I10 HYPERTENSION, UNSPECIFIED TYPE: ICD-10-CM

## 2021-11-22 PROCEDURE — G0277 HBOT, FULL BODY CHAMBER, 30M: HCPCS | Performed by: FAMILY MEDICINE

## 2021-11-22 PROCEDURE — 99183 HYPERBARIC OXYGEN THERAPY: CPT | Performed by: FAMILY MEDICINE

## 2021-11-22 RX ORDER — LISINOPRIL 20 MG/1
20 TABLET ORAL DAILY
Qty: 90 TABLET | Refills: 1 | Status: SHIPPED | OUTPATIENT
Start: 2021-11-22 | End: 2022-03-29 | Stop reason: SDUPTHER

## 2021-11-23 ENCOUNTER — OFFICE VISIT (OUTPATIENT)
Dept: WOUND CARE | Facility: CLINIC | Age: 71
End: 2021-11-23
Payer: COMMERCIAL

## 2021-11-23 DIAGNOSIS — L59.8 OTHER SPECIFIED DISORDERS OF THE SKIN AND SUBCUTANEOUS TISSUE RELATED TO RADIATION: ICD-10-CM

## 2021-11-23 PROCEDURE — 99183 HYPERBARIC OXYGEN THERAPY: CPT | Performed by: FAMILY MEDICINE

## 2021-11-23 PROCEDURE — G0277 HBOT, FULL BODY CHAMBER, 30M: HCPCS | Performed by: FAMILY MEDICINE

## 2021-11-24 ENCOUNTER — OFFICE VISIT (OUTPATIENT)
Dept: WOUND CARE | Facility: CLINIC | Age: 71
End: 2021-11-24
Payer: COMMERCIAL

## 2021-11-24 VITALS
RESPIRATION RATE: 18 BRPM | DIASTOLIC BLOOD PRESSURE: 81 MMHG | SYSTOLIC BLOOD PRESSURE: 140 MMHG | HEART RATE: 71 BPM | TEMPERATURE: 97.4 F

## 2021-11-24 DIAGNOSIS — L59.8 OTHER SPECIFIED DISORDERS OF THE SKIN AND SUBCUTANEOUS TISSUE RELATED TO RADIATION: Primary | ICD-10-CM

## 2021-11-24 PROCEDURE — 99183 HYPERBARIC OXYGEN THERAPY: CPT | Performed by: NURSE PRACTITIONER

## 2021-11-24 PROCEDURE — G0277 HBOT, FULL BODY CHAMBER, 30M: HCPCS | Performed by: NURSE PRACTITIONER

## 2021-11-29 ENCOUNTER — OFFICE VISIT (OUTPATIENT)
Dept: WOUND CARE | Facility: CLINIC | Age: 71
End: 2021-11-29
Payer: COMMERCIAL

## 2021-11-29 VITALS
DIASTOLIC BLOOD PRESSURE: 88 MMHG | HEART RATE: 86 BPM | SYSTOLIC BLOOD PRESSURE: 153 MMHG | RESPIRATION RATE: 18 BRPM | TEMPERATURE: 96.1 F

## 2021-11-29 DIAGNOSIS — L59.8 OTHER SPECIFIED DISORDERS OF THE SKIN AND SUBCUTANEOUS TISSUE RELATED TO RADIATION: Primary | ICD-10-CM

## 2021-11-29 PROCEDURE — G0277 HBOT, FULL BODY CHAMBER, 30M: HCPCS | Performed by: FAMILY MEDICINE

## 2021-11-29 PROCEDURE — 99183 HYPERBARIC OXYGEN THERAPY: CPT | Performed by: FAMILY MEDICINE

## 2021-12-14 DIAGNOSIS — F41.9 ANXIETY: ICD-10-CM

## 2021-12-14 DIAGNOSIS — F33.1 MODERATE EPISODE OF RECURRENT MAJOR DEPRESSIVE DISORDER (HCC): ICD-10-CM

## 2021-12-15 RX ORDER — FLUOXETINE HYDROCHLORIDE 40 MG/1
CAPSULE ORAL
Qty: 90 CAPSULE | Refills: 1 | Status: SHIPPED | OUTPATIENT
Start: 2021-12-15 | End: 2022-02-28 | Stop reason: SDUPTHER

## 2021-12-15 RX ORDER — BUSPIRONE HYDROCHLORIDE 5 MG/1
TABLET ORAL
Qty: 180 TABLET | Refills: 1 | Status: SHIPPED | OUTPATIENT
Start: 2021-12-15 | End: 2022-02-01 | Stop reason: SDUPTHER

## 2021-12-20 ENCOUNTER — CLINICAL SUPPORT (OUTPATIENT)
Dept: FAMILY MEDICINE CLINIC | Facility: CLINIC | Age: 71
End: 2021-12-20
Payer: COMMERCIAL

## 2021-12-20 ENCOUNTER — TELEPHONE (OUTPATIENT)
Dept: FAMILY MEDICINE CLINIC | Facility: CLINIC | Age: 71
End: 2021-12-20

## 2021-12-20 VITALS — SYSTOLIC BLOOD PRESSURE: 117 MMHG | DIASTOLIC BLOOD PRESSURE: 77 MMHG

## 2021-12-20 DIAGNOSIS — Z01.30 BP CHECK: Primary | ICD-10-CM

## 2021-12-20 PROCEDURE — 99211 OFF/OP EST MAY X REQ PHY/QHP: CPT

## 2021-12-20 PROCEDURE — 1160F RVW MEDS BY RX/DR IN RCRD: CPT

## 2022-01-31 DIAGNOSIS — F41.8 DEPRESSION WITH ANXIETY: ICD-10-CM

## 2022-01-31 RX ORDER — LORAZEPAM 2 MG/1
2 TABLET ORAL 2 TIMES DAILY PRN
Qty: 60 TABLET | Refills: 5 | Status: SHIPPED | OUTPATIENT
Start: 2022-01-31 | End: 2022-08-02 | Stop reason: SDUPTHER

## 2022-01-31 NOTE — TELEPHONE ENCOUNTER
Patient requesting refill(s) of:  Lorazepam 2 Mg   Last filled:7/6/21  Last appt:11/15/21  Next appt:3/29/22  Pharmacy:[facility]   [facility]

## 2022-02-28 DIAGNOSIS — F33.1 MODERATE EPISODE OF RECURRENT MAJOR DEPRESSIVE DISORDER (HCC): ICD-10-CM

## 2022-02-28 DIAGNOSIS — E03.9 ACQUIRED HYPOTHYROIDISM: ICD-10-CM

## 2022-02-28 RX ORDER — FLUOXETINE HYDROCHLORIDE 40 MG/1
40 CAPSULE ORAL DAILY
Qty: 90 CAPSULE | Refills: 1 | Status: SHIPPED | OUTPATIENT
Start: 2022-02-28 | End: 2022-06-01

## 2022-02-28 RX ORDER — LEVOTHYROXINE SODIUM 0.1 MG/1
100 TABLET ORAL DAILY
Qty: 90 TABLET | Refills: 1 | Status: SHIPPED | OUTPATIENT
Start: 2022-02-28

## 2022-02-28 NOTE — TELEPHONE ENCOUNTER
Patient requesting refill(s) of: fluoxetine 40 mg daily    Last filled: 12/15/2021 #90 x 1      Patient requesting refill(s) of: levothyroxine 100 mcg daily    Last filled: 5/20/2021 #90 x 1  Last appt:9/3/2021  Next appt:3/29/2022  Pharmacy: [facility]

## 2022-03-29 ENCOUNTER — OFFICE VISIT (OUTPATIENT)
Dept: FAMILY MEDICINE CLINIC | Facility: CLINIC | Age: 72
End: 2022-03-29
Payer: COMMERCIAL

## 2022-03-29 VITALS
WEIGHT: 157 LBS | BODY MASS INDEX: 23.79 KG/M2 | HEART RATE: 73 BPM | OXYGEN SATURATION: 98 % | TEMPERATURE: 99.5 F | DIASTOLIC BLOOD PRESSURE: 72 MMHG | HEIGHT: 68 IN | RESPIRATION RATE: 18 BRPM | SYSTOLIC BLOOD PRESSURE: 120 MMHG

## 2022-03-29 DIAGNOSIS — E03.9 ACQUIRED HYPOTHYROIDISM: ICD-10-CM

## 2022-03-29 DIAGNOSIS — Z12.11 COLON CANCER SCREENING: ICD-10-CM

## 2022-03-29 DIAGNOSIS — C09.9 MALIGNANT NEOPLASM OF TONSIL (HCC): ICD-10-CM

## 2022-03-29 DIAGNOSIS — E55.9 VITAMIN D DEFICIENCY: ICD-10-CM

## 2022-03-29 DIAGNOSIS — F33.9 DEPRESSION, RECURRENT (HCC): ICD-10-CM

## 2022-03-29 DIAGNOSIS — Z00.00 ENCOUNTER FOR MEDICARE ANNUAL WELLNESS EXAM: Primary | ICD-10-CM

## 2022-03-29 DIAGNOSIS — I10 ESSENTIAL HYPERTENSION: ICD-10-CM

## 2022-03-29 DIAGNOSIS — Z13.220 SCREENING CHOLESTEROL LEVEL: ICD-10-CM

## 2022-03-29 DIAGNOSIS — I10 HYPERTENSION, UNSPECIFIED TYPE: ICD-10-CM

## 2022-03-29 PROCEDURE — 3008F BODY MASS INDEX DOCD: CPT | Performed by: NURSE PRACTITIONER

## 2022-03-29 PROCEDURE — 1090F PRES/ABSN URINE INCON ASSESS: CPT | Performed by: NURSE PRACTITIONER

## 2022-03-29 PROCEDURE — 3288F FALL RISK ASSESSMENT DOCD: CPT | Performed by: NURSE PRACTITIONER

## 2022-03-29 PROCEDURE — 3078F DIAST BP <80 MM HG: CPT | Performed by: NURSE PRACTITIONER

## 2022-03-29 PROCEDURE — 3725F SCREEN DEPRESSION PERFORMED: CPT | Performed by: NURSE PRACTITIONER

## 2022-03-29 PROCEDURE — 1170F FXNL STATUS ASSESSED: CPT | Performed by: NURSE PRACTITIONER

## 2022-03-29 PROCEDURE — 1125F AMNT PAIN NOTED PAIN PRSNT: CPT | Performed by: NURSE PRACTITIONER

## 2022-03-29 PROCEDURE — 1160F RVW MEDS BY RX/DR IN RCRD: CPT | Performed by: NURSE PRACTITIONER

## 2022-03-29 PROCEDURE — G0439 PPPS, SUBSEQ VISIT: HCPCS | Performed by: NURSE PRACTITIONER

## 2022-03-29 PROCEDURE — 3074F SYST BP LT 130 MM HG: CPT | Performed by: NURSE PRACTITIONER

## 2022-03-29 PROCEDURE — 99213 OFFICE O/P EST LOW 20 MIN: CPT | Performed by: NURSE PRACTITIONER

## 2022-03-29 PROCEDURE — 1101F PT FALLS ASSESS-DOCD LE1/YR: CPT | Performed by: NURSE PRACTITIONER

## 2022-03-29 PROCEDURE — 1036F TOBACCO NON-USER: CPT | Performed by: NURSE PRACTITIONER

## 2022-03-29 RX ORDER — OFLOXACIN 3 MG/ML
10 SOLUTION AURICULAR (OTIC) 2 TIMES DAILY
COMMUNITY
End: 2022-03-29

## 2022-03-29 RX ORDER — LISINOPRIL 20 MG/1
20 TABLET ORAL DAILY
Qty: 90 TABLET | Refills: 3 | Status: SHIPPED | OUTPATIENT
Start: 2022-03-29

## 2022-03-29 NOTE — PATIENT INSTRUCTIONS
Medicare Preventive Visit Patient Instructions  Thank you for completing your Welcome to Medicare Visit or Medicare Annual Wellness Visit today  Your next wellness visit will be due in one year (3/30/2023)  The screening/preventive services that you may require over the next 5-10 years are detailed below  Some tests may not apply to you based off risk factors and/or age  Screening tests ordered at today's visit but not completed yet may show as past due  Also, please note that scanned in results may not display below  Preventive Screenings:  Service Recommendations Previous Testing/Comments   Colorectal Cancer Screening  * Colonoscopy    * Fecal Occult Blood Test (FOBT)/Fecal Immunochemical Test (FIT)  * Fecal DNA/Cologuard Test  * Flexible Sigmoidoscopy Age: 54-65 years old   Colonoscopy: every 10 years (may be performed more frequently if at higher risk)  OR  FOBT/FIT: every 1 year  OR  Cologuard: every 3 years  OR  Sigmoidoscopy: every 5 years  Screening may be recommended earlier than age 48 if at higher risk for colorectal cancer  Also, an individualized decision between you and your healthcare provider will decide whether screening between the ages of 74-80 would be appropriate  Colonoscopy: 10/11/2012  FOBT/FIT: Not on file  Cologuard: Not on file  Sigmoidoscopy: Not on file    Screening Current     Breast Cancer Screening Age: 36 years old  Frequency: every 1-2 years  Not required if history of left and right mastectomy Mammogram: 02/24/2020        Cervical Cancer Screening Between the ages of 21-29, pap smear recommended once every 3 years  Between the ages of 33-67, can perform pap smear with HPV co-testing every 5 years     Recommendations may differ for women with a history of total hysterectomy, cervical cancer, or abnormal pap smears in past  Pap Smear: Not on file    Screening Not Indicated   Hepatitis C Screening Once for adults born between 1945 and 1965  More frequently in patients at high risk for Hepatitis C Hep C Antibody: 02/24/2020    Screening Current   Diabetes Screening 1-2 times per year if you're at risk for diabetes or have pre-diabetes Fasting glucose: 90 mg/dL   A1C: 5 2 %    Screening Current   Cholesterol Screening Once every 5 years if you don't have a lipid disorder  May order more often based on risk factors  Lipid panel: 11/10/2021    Screening Current     Other Preventive Screenings Covered by Medicare:  1  Abdominal Aortic Aneurysm (AAA) Screening: covered once if your at risk  You're considered to be at risk if you have a family history of AAA  2  Lung Cancer Screening: covers low dose CT scan once per year if you meet all of the following conditions: (1) Age 50-69; (2) No signs or symptoms of lung cancer; (3) Current smoker or have quit smoking within the last 15 years; (4) You have a tobacco smoking history of at least 30 pack years (packs per day multiplied by number of years you smoked); (5) You get a written order from a healthcare provider  3  Glaucoma Screening: covered annually if you're considered high risk: (1) You have diabetes OR (2) Family history of glaucoma OR (3)  aged 48 and older OR (3)  American aged 72 and older  3  Osteoporosis Screening: covered every 2 years if you meet one of the following conditions: (1) You're estrogen deficient and at risk for osteoporosis based off medical history and other findings; (2) Have a vertebral abnormality; (3) On glucocorticoid therapy for more than 3 months; (4) Have primary hyperparathyroidism; (5) On osteoporosis medications and need to assess response to drug therapy  · Last bone density test (DXA Scan): Not on file  5  HIV Screening: covered annually if you're between the age of 12-76  Also covered annually if you are younger than 13 and older than 72 with risk factors for HIV infection  For pregnant patients, it is covered up to 3 times per pregnancy      Immunizations:  Immunization Recommendations   Influenza Vaccine Annual influenza vaccination during flu season is recommended for all persons aged >= 6 months who do not have contraindications   Pneumococcal Vaccine (Prevnar and Pneumovax)  * Prevnar = PCV13  * Pneumovax = PPSV23   Adults 25-60 years old: 1-3 doses may be recommended based on certain risk factors  Adults 72 years old: Prevnar (PCV13) vaccine recommended followed by Pneumovax (PPSV23) vaccine  If already received PPSV23 since turning 65, then PCV13 recommended at least one year after PPSV23 dose  Hepatitis B Vaccine 3 dose series if at intermediate or high risk (ex: diabetes, end stage renal disease, liver disease)   Tetanus (Td) Vaccine - COST NOT COVERED BY MEDICARE PART B Following completion of primary series, a booster dose should be given every 10 years to maintain immunity against tetanus  Td may also be given as tetanus wound prophylaxis  Tdap Vaccine - COST NOT COVERED BY MEDICARE PART B Recommended at least once for all adults  For pregnant patients, recommended with each pregnancy  Shingles Vaccine (Shingrix) - COST NOT COVERED BY MEDICARE PART B  2 shot series recommended in those aged 48 and above     Health Maintenance Due:      Topic Date Due    Breast Cancer Screening: Mammogram  06/22/2022 (Originally 2/24/2021)    Colorectal Cancer Screening  10/11/2022    Hepatitis C Screening  Completed     Immunizations Due:      Topic Date Due    DTaP,Tdap,and Td Vaccines (1 - Tdap) Never done    COVID-19 Vaccine (3 - Booster for Pfizer series) 09/12/2021    Pneumococcal Vaccine: 65+ Years (2 of 2 - PPSV23) 02/19/2022     Advance Directives   What are advance directives? Advance directives are legal documents that state your wishes and plans for medical care  These plans are made ahead of time in case you lose your ability to make decisions for yourself   Advance directives can apply to any medical decision, such as the treatments you want, and if you want to donate organs  What are the types of advance directives? There are many types of advance directives, and each state has rules about how to use them  You may choose a combination of any of the following:  · Living will: This is a written record of the treatment you want  You can also choose which treatments you do not want, which to limit, and which to stop at a certain time  This includes surgery, medicine, IV fluid, and tube feedings  · Durable power of  for healthcare [facility] [place]): This is a written record that states who you want to make healthcare choices for you when you are unable to make them for yourself  This person, called a proxy, is usually a family member or a friend  You may choose more than 1 proxy  · Do not resuscitate (DNR) order:  A DNR order is used in case your heart stops beating or you stop breathing  It is a request not to have certain forms of treatment, such as CPR  A DNR order may be included in other types of advance directives  · Medical directive: This covers the care that you want if you are in a coma, near death, or unable to make decisions for yourself  You can list the treatments you want for each condition  Treatment may include pain medicine, surgery, blood transfusions, dialysis, IV or tube feedings, and a ventilator (breathing machine)  · Values history: This document has questions about your views, beliefs, and how you feel and think about life  This information can help others choose the care that you would choose  Why are advance directives important? An advance directive helps you control your care  Although spoken wishes may be used, it is better to have your wishes written down  Spoken wishes can be misunderstood, or not followed  Treatments may be given even if you do not want them  An advance directive may make it easier for your family to make difficult choices about your care        © Copyright [employer] 2018 Information is for End User's use only and may not be sold, redistributed or otherwise used for commercial purposes   All illustrations and images included in CareNotes® are the copyrighted property of A D A M , Inc  or [facility] Alethea Stone

## 2022-03-29 NOTE — PROGRESS NOTES
Assessment and Plan:     Problem List Items Addressed This Visit        Endocrine    Acquired hypothyroidism    Relevant Orders    TSH, 3rd generation with Free T4 reflex       Cardiovascular and Mediastinum    Essential hypertension    Relevant Medications    lisinopril (ZESTRIL) 20 mg tablet    Other Relevant Orders    Comprehensive metabolic panel    CBC and differential       Other    Depression, recurrent ([address] )      Other Visit Diagnoses     Encounter for Medicare annual wellness exam    -  Primary    Colon cancer screening        Relevant Orders    Ambulatory Referral to Gastroenterology    Hypertension, unspecified type        Relevant Medications    lisinopril (ZESTRIL) 20 mg tablet    Vitamin D deficiency        Relevant Orders    Vitamin D 25 hydroxy    Screening cholesterol level        Relevant Orders    Lipid panel    Malignant neoplasm of tonsil [facility])               Preventive health issues were discussed with patient, and age appropriate screening tests were ordered as noted in patient's After Visit Summary  Personalized health advice and appropriate referrals for health education or preventive services given if needed, as noted in patient's After Visit Summary  History of Present Illness:     Patient presents for Medicare Annual Wellness visit    Patient Care Team:  Ronel freitas PCP - General (Family Medicine)     Problem List:     Patient Active Problem List   Diagnosis    Overweight (BMI 25 0-29  9)    Impaired fasting glucose    Acquired hypothyroidism    Depression, recurrent (HCC)    Tympanic membrane perforation, left    Mixed conductive and sensorineural hearing loss of left ear with restricted hearing of right ear    Dental abscess    Essential hypertension    Anxiety      Past Medical and Surgical History:     Past Medical History:   Diagnosis Date    Anxiety     Cancer [facility]) ~2009    Tonsils- followed by Oncology( Dr David Calabrese, Dr Clayton Terrell)    Disease of thyroid gland      Past Surgical History:   Procedure Laterality Date    INCISION AND DRAINAGE OF WOUND Right 8/28/2021    Procedure: INCISION AND DRAINAGE (I&D) HEAD/FACE;  Surgeon: Delaney Finnegan DMD;  Location: [facility];  Service: Maxillofacial    MOUTH SURGERY      MULTIPLE TOOTH EXTRACTIONS Right 8/28/2021    Procedure: EXTRACTION TEETH 25,26,27;  Surgeon: Delaney Finnegan DMD;  Location:  MAIN OR;  Service: Dana Kussmaul Dr Rozelle Ravens      Family History:     Family History   Problem Relation Age of Onset    Cancer Mother     No Known Problems Father     Breast cancer Sister 27    No Known Problems Sister     No Known Problems Sister     No Known Problems Sister     No Known Problems Daughter     Diabetes Maternal Grandmother     No Known Problems Paternal Grandmother     No Known Problems Maternal Aunt     No Known Problems Maternal Aunt       Social History:     Social History     Socioeconomic History    Marital status:      Spouse name: None    Number of children: None    Years of education: None    Highest education level: None   Occupational History    None   Tobacco Use    Smoking status: Former Smoker     Years: 20 00     Types: Cigarettes    Smokeless tobacco: Never Used   Vaping Use    Vaping Use: Never used   Substance and Sexual Activity    Alcohol use:  Yes    Drug use: None    Sexual activity: None   Other Topics Concern    None   Social History Narrative    None     Social Determinants of Health     Financial Resource Strain: Not on file   Food Insecurity: Not on file   Transportation Needs: Not on file   Physical Activity: Not on file   Stress: Not on file   Social Connections: Not on file   Intimate Partner Violence: Not on file   Housing Stability: Not on file      Medications and Allergies:     Current Outpatient Medications   Medication Sig Dispense Refill    acetaminophen (TYLENOL) 325 mg tablet Take 3 tablets (975 mg total) by mouth every 8 (eight) hours  0    ascorbic acid (VITAMIN C) 1000 MG tablet Take 1,000 mg by mouth daily      aspirin (Aspirin 81) 81 mg EC tablet Take 1 tablet (81 mg total) by mouth daily  0    busPIRone (BUSPAR) 5 mg tablet Take 1 tablet (5 mg total) by mouth 2 (two) times a day 180 tablet 3    FLUoxetine (PROzac) 40 MG capsule Take 1 capsule (40 mg total) by mouth daily 90 capsule 1    ibuprofen (MOTRIN) 400 mg tablet Take 1 tablet (400 mg total) by mouth every 8 (eight) hours as needed for mild pain for up to 7 days 21 tablet 0    levothyroxine 100 mcg tablet Take 1 tablet (100 mcg total) by mouth daily 90 tablet 1    lisinopril (ZESTRIL) 20 mg tablet Take 1 tablet (20 mg total) by mouth daily 90 tablet 3    LORazepam (ATIVAN) 2 mg tablet Take 1 tablet (2 mg total) by mouth 2 (two) times a day as needed for anxiety 60 tablet 5    Melatonin 10 MG TABS Take by mouth once at bedtime      Multiple Vitamin (MULTIVITAMIN) tablet Take 1 tablet by mouth daily      TURMERIC PO Take 1,000 mg by mouth daily      vitamin B-12 (VITAMIN B-12) 500 mcg tablet Take 5,000 mcg by mouth daily      Vitamin D, Cholecalciferol, 50 MCG (2000 UT) CAPS Take by mouth daily       No current facility-administered medications for this visit       No Known Allergies   Immunizations:     Immunization History   Administered Date(s) Administered    COVID-19 PFIZER VACCINE 0 3 ML IM 03/22/2021, 04/12/2021    Influenza Split High Dose Preservative Free IM 11/05/2019    Influenza, high dose seasonal 0 7 mL 09/06/2020, 11/15/2021    Pneumococcal Conjugate 13-Valent 02/19/2021    Zoster Vaccine Recombinant 09/10/2019, 12/21/2019      Health Maintenance:         Topic Date Due    Breast Cancer Screening: Mammogram  06/22/2022 (Originally 2/24/2021)    Colorectal Cancer Screening  10/11/2022    Hepatitis C Screening  Completed         Topic Date Due    DTaP,Tdap,and Td Vaccines (1 - Tdap) Never done  COVID-19 Vaccine (3 - Booster for Pfizer series) 09/12/2021    Pneumococcal Vaccine: 65+ Years (2 of 2 - PPSV23) 02/19/2022      Medicare Health Risk Assessment:     /72   Pulse 73   Temp 99 5 °F (37 5 °C) (Tympanic)   Resp 18   Ht 5' 7 75" (1 721 m)   Wt 71 2 kg (157 lb)   SpO2 98%   BMI 24 05 kg/m²          Health Risk Assessment:   Patient rates overall health as good  Patient feels that their physical health rating is same  Patient is satisfied with their life  Eyesight was rated as same  Hearing was rated as slightly worse  Patient feels that their emotional and mental health rating is slightly worse  Patients states they are never, rarely angry  Patient states they are always unusually tired/fatigued  Pain experienced in the last 7 days has been none  Patient states that she has experienced no weight loss or gain in last 6 months  Depression Screening:   PHQ-9 Score: 0      Fall Risk Screening: In the past year, patient has experienced: no history of falling in past year      Urinary Incontinence Screening:   Patient has not leaked urine accidently in the last six months  Home Safety:  Patient does not have trouble with stairs inside or outside of their home  Patient has working smoke alarms and has working carbon monoxide detector  Home safety hazards include: none  Nutrition:   Current diet is Regular  Medications:   Patient is not currently taking any over-the-counter supplements  Patient is able to manage medications  Activities of Daily Living (ADLs)/Instrumental Activities of Daily Living (IADLs):   Walk and transfer into and out of bed and chair?: Yes  Dress and groom yourself?: Yes    Bathe or shower yourself?: Yes    Feed yourself?  Yes  Do your laundry/housekeeping?: Yes  Manage your money, pay your bills and track your expenses?: Yes  Make your own meals?: Yes    Do your own shopping?: Yes    Previous Hospitalizations:   Any hospitalizations or ED visits within the last 12 months?: Yes    How many hospitalizations have you had in the last year?: 1-2    Advance Care Planning:   Living will: Yes    Durable POA for healthcare: Yes    Advanced directive: Yes      PREVENTIVE SCREENINGS      Cardiovascular Screening:    General: Screening Current    Due for: Lipid Panel      Diabetes Screening:     General: Screening Current    Due for: Blood Glucose      Colorectal Cancer Screening:     General: Screening Current    Due for: Colonoscopy - Low Risk      Breast Cancer Screening:       Due for: Mammogram        Cervical Cancer Screening:    General: Screening Not Indicated      Lung Cancer Screening:     General: Screening Not Indicated      Hepatitis C Screening:    General: Screening Current    Screening, Brief Intervention, and Referral to Treatment (SBIRT)    Screening  Typical number of drinks in a day: 0  Typical number of drinks in a week: 2  Interpretation: Low risk drinking behavior      Single Item Drug Screening:  How often have you used an illegal drug (including marijuana) or a prescription medication for non-medical reasons in the past year? never    Single Item Drug Screen Score: 0  Interpretation: Negative screen for possible drug use disorder      MARY Wade

## 2022-03-29 NOTE — PROGRESS NOTES
[facility]        NAME: Janes Lemus is a 80 y o  female  : 1950    MRN: [id number]  DATE: 2022  TIME: 8:17 AM    Assessment and Plan   Acquired hypothyroidism [E03 9]  1  Acquired hypothyroidism  TSH, 3rd generation with Free T4 reflex   2  Colon cancer screening  Ambulatory Referral to Gastroenterology   3  Hypertension, unspecified type  lisinopril (ZESTRIL) 20 mg tablet   4  Depression, recurrent ([address] )     5  Essential hypertension  Comprehensive metabolic panel    CBC and differential   6  Vitamin D deficiency  Vitamin D 25 hydroxy   7  Screening cholesterol level  Lipid panel   8  Malignant neoplasm of tonsil ([facility] [address] )     9  Encounter for Medicare annual wellness exam           Patient Instructions     Patient Instructions       Medicare Preventive Visit Patient Instructions  Thank you for completing your Welcome to Medicare Visit or Medicare Annual Wellness Visit today  Your next wellness visit will be due in one year (3/30/2023)  The screening/preventive services that you may require over the next 5-10 years are detailed below  Some tests may not apply to you based off risk factors and/or age  Screening tests ordered at today's visit but not completed yet may show as past due  Also, please note that scanned in results may not display below  Preventive Screenings:  Service Recommendations Previous Testing/Comments   Colorectal Cancer Screening  * Colonoscopy    * Fecal Occult Blood Test (FOBT)/Fecal Immunochemical Test (FIT)  * Fecal DNA/Cologuard Test  * Flexible Sigmoidoscopy Age: 54-65 years old   Colonoscopy: every 10 years (may be performed more frequently if at higher risk)  OR  FOBT/FIT: every 1 year  OR  Cologuard: every 3 years  OR  Sigmoidoscopy: every 5 years  Screening may be recommended earlier than age 48 if at higher risk for colorectal cancer   Also, an individualized decision between you and your healthcare provider will decide whether screening between the ages of 74-80 would be appropriate  Colonoscopy: 10/11/2012  FOBT/FIT: Not on file  Cologuard: Not on file  Sigmoidoscopy: Not on file    Screening Current     Breast Cancer Screening Age: 36 years old  Frequency: every 1-2 years  Not required if history of left and right mastectomy Mammogram: 02/24/2020        Cervical Cancer Screening Between the ages of 21-29, pap smear recommended once every 3 years  Between the ages of 33-67, can perform pap smear with HPV co-testing every 5 years  Recommendations may differ for women with a history of total hysterectomy, cervical cancer, or abnormal pap smears in past  Pap Smear: Not on file    Screening Not Indicated   Hepatitis C Screening Once for adults born between 1945 and 1965  More frequently in patients at high risk for Hepatitis C Hep C Antibody: 02/24/2020    Screening Current   Diabetes Screening 1-2 times per year if you're at risk for diabetes or have pre-diabetes Fasting glucose: 90 mg/dL   A1C: 5 2 %    Screening Current   Cholesterol Screening Once every 5 years if you don't have a lipid disorder  May order more often based on risk factors  Lipid panel: 11/10/2021    Screening Current     Other Preventive Screenings Covered by Medicare:  1  Abdominal Aortic Aneurysm (AAA) Screening: covered once if your at risk  You're considered to be at risk if you have a family history of AAA  2  Lung Cancer Screening: covers low dose CT scan once per year if you meet all of the following conditions: (1) Age 50-69; (2) No signs or symptoms of lung cancer; (3) Current smoker or have quit smoking within the last 15 years; (4) You have a tobacco smoking history of at least 30 pack years (packs per day multiplied by number of years you smoked); (5) You get a written order from a healthcare provider    3  Glaucoma Screening: covered annually if you're considered high risk: (1) You have diabetes OR (2) Family history of glaucoma OR (3)  aged 48 and older OR (4)  American aged 72 and older  3  Osteoporosis Screening: covered every 2 years if you meet one of the following conditions: (1) You're estrogen deficient and at risk for osteoporosis based off medical history and other findings; (2) Have a vertebral abnormality; (3) On glucocorticoid therapy for more than 3 months; (4) Have primary hyperparathyroidism; (5) On osteoporosis medications and need to assess response to drug therapy  · Last bone density test (DXA Scan): Not on file  5  HIV Screening: covered annually if you're between the age of 12-76  Also covered annually if you are younger than 13 and older than 72 with risk factors for HIV infection  For pregnant patients, it is covered up to 3 times per pregnancy  Immunizations:  Immunization Recommendations   Influenza Vaccine Annual influenza vaccination during flu season is recommended for all persons aged >= 6 months who do not have contraindications   Pneumococcal Vaccine (Prevnar and Pneumovax)  * Prevnar = PCV13  * Pneumovax = PPSV23   Adults 25-60 years old: 1-3 doses may be recommended based on certain risk factors  Adults 72 years old: Prevnar (PCV13) vaccine recommended followed by Pneumovax (PPSV23) vaccine  If already received PPSV23 since turning 65, then PCV13 recommended at least one year after PPSV23 dose  Hepatitis B Vaccine 3 dose series if at intermediate or high risk (ex: diabetes, end stage renal disease, liver disease)   Tetanus (Td) Vaccine - COST NOT COVERED BY MEDICARE PART B Following completion of primary series, a booster dose should be given every 10 years to maintain immunity against tetanus  Td may also be given as tetanus wound prophylaxis  Tdap Vaccine - COST NOT COVERED BY MEDICARE PART B Recommended at least once for all adults  For pregnant patients, recommended with each pregnancy     Shingles Vaccine (Shingrix) - COST NOT COVERED BY MEDICARE PART B  2 shot series recommended in those aged 48 and above Health Maintenance Due:      Topic Date Due    Breast Cancer Screening: Mammogram  06/22/2022 (Originally 2/24/2021)    Colorectal Cancer Screening  10/11/2022    Hepatitis C Screening  Completed     Immunizations Due:      Topic Date Due    DTaP,Tdap,and Td Vaccines (1 - Tdap) Never done    COVID-19 Vaccine (3 - Booster for Pfizer series) 09/12/2021    Pneumococcal Vaccine: 65+ Years (2 of 2 - PPSV23) 02/19/2022     Advance Directives   What are advance directives? Advance directives are legal documents that state your wishes and plans for medical care  These plans are made ahead of time in case you lose your ability to make decisions for yourself  Advance directives can apply to any medical decision, such as the treatments you want, and if you want to donate organs  What are the types of advance directives? There are many types of advance directives, and each state has rules about how to use them  You may choose a combination of any of the following:  · Living will: This is a written record of the treatment you want  You can also choose which treatments you do not want, which to limit, and which to stop at a certain time  This includes surgery, medicine, IV fluid, and tube feedings  · Durable power of  for healthcare [facility]): This is a written record that states who you want to make healthcare choices for you when you are unable to make them for yourself  This person, called a proxy, is usually a family member or a friend  You may choose more than 1 proxy  · Do not resuscitate (DNR) order:  A DNR order is used in case your heart stops beating or you stop breathing  It is a request not to have certain forms of treatment, such as CPR  A DNR order may be included in other types of advance directives  · Medical directive: This covers the care that you want if you are in a coma, near death, or unable to make decisions for yourself  You can list the treatments you want for each condition  Treatment may include pain medicine, surgery, blood transfusions, dialysis, IV or tube feedings, and a ventilator (breathing machine)  · Values history: This document has questions about your views, beliefs, and how you feel and think about life  This information can help others choose the care that you would choose  Why are advance directives important? An advance directive helps you control your care  Although spoken wishes may be used, it is better to have your wishes written down  Spoken wishes can be misunderstood, or not followed  Treatments may be given even if you do not want them  An advance directive may make it easier for your family to make difficult choices about your care  © Copyright [address] 2018 Information is for End User's use only and may not be sold, redistributed or otherwise used for commercial purposes  All illustrations and images included in CareNotes® are the copyrighted property of A D A M , Inc  or [address] [place] [address]            Chief Complaint     Chief Complaint   Patient presents with    Follow-up    Medicare Wellness Visit         History of Present Illness       Here for 4 month medcheck- review most recent bloodwork  C/o anxiety and depression- does not want to adjust medication at this time    PHQ-2/9 Depression Screening    Little interest or pleasure in doing things: 0 - not at all  Feeling down, depressed, or hopeless: 0 - not at all  Trouble falling or staying asleep, or sleeping too much: 0 - not at all  Feeling tired or having little energy: 0 - not at all  Poor appetite or overeatin - not at all  Feeling bad about yourself - or that you are a failure or have let   yourself or your family down: 0 - not at all  Trouble concentrating on things, such as reading the newspaper or watching   television: 0 - not at all  Moving or speaking so slowly that other people could have noticed   Or the   opposite - being so fidgety or restless that you have been moving around a   lot more than usual: 0 - not at all  Thoughts that you would be better off dead, or of hurting yourself in some   way: 0 - not at all  PHQ-9 Score: 0   PHQ-9 Interpretation: No or Minimal depression            Review of Systems   Review of Systems   Constitutional: Negative for activity change, diaphoresis, fatigue and fever  HENT: Negative for congestion, facial swelling, hearing loss, rhinorrhea, sinus pressure, sinus pain, sneezing, sore throat and voice change  Eyes: Negative for discharge and visual disturbance  Respiratory: Negative for cough, choking, chest tightness, shortness of breath, wheezing and stridor  Cardiovascular: Negative for chest pain, palpitations and leg swelling  Gastrointestinal: Negative for abdominal distention, abdominal pain, constipation, diarrhea, nausea and vomiting  Endocrine: Negative for polydipsia, polyphagia and polyuria  Genitourinary: Negative for difficulty urinating, dysuria, frequency and urgency  Musculoskeletal: Negative for arthralgias, back pain, gait problem, joint swelling, myalgias, neck pain and neck stiffness  Skin: Negative for color change, rash and wound  Neurological: Negative for dizziness, syncope, speech difficulty, weakness, light-headedness and headaches  Hematological: Negative for adenopathy  Does not bruise/bleed easily  Psychiatric/Behavioral: Positive for dysphoric mood  Negative for agitation, behavioral problems, confusion, hallucinations, sleep disturbance and suicidal ideas  The patient is nervous/anxious            Current Medications       Current Outpatient Medications:     acetaminophen (TYLENOL) 325 mg tablet, Take 3 tablets (975 mg total) by mouth every 8 (eight) hours, Disp: , Rfl: 0    ascorbic acid (VITAMIN C) 1000 MG tablet, Take 1,000 mg by mouth daily, Disp: , Rfl:     aspirin (Aspirin 81) 81 mg EC tablet, Take 1 tablet (81 mg total) by mouth daily, Disp: , Rfl: 0    busPIRone (BUSPAR) 5 mg tablet, Take 1 tablet (5 mg total) by mouth 2 (two) times a day, Disp: 180 tablet, Rfl: 3    FLUoxetine (PROzac) 40 MG capsule, Take 1 capsule (40 mg total) by mouth daily, Disp: 90 capsule, Rfl: 1    ibuprofen (MOTRIN) 400 mg tablet, Take 1 tablet (400 mg total) by mouth every 8 (eight) hours as needed for mild pain for up to 7 days, Disp: 21 tablet, Rfl: 0    levothyroxine 100 mcg tablet, Take 1 tablet (100 mcg total) by mouth daily, Disp: 90 tablet, Rfl: 1    lisinopril (ZESTRIL) 20 mg tablet, Take 1 tablet (20 mg total) by mouth daily, Disp: 90 tablet, Rfl: 3    LORazepam (ATIVAN) 2 mg tablet, Take 1 tablet (2 mg total) by mouth 2 (two) times a day as needed for anxiety, Disp: 60 tablet, Rfl: 5    Melatonin 10 MG TABS, Take by mouth once at bedtime, Disp: , Rfl:     Multiple Vitamin (MULTIVITAMIN) tablet, Take 1 tablet by mouth daily, Disp: , Rfl:     TURMERIC PO, Take 1,000 mg by mouth daily, Disp: , Rfl:     vitamin B-12 (VITAMIN B-12) 500 mcg tablet, Take 5,000 mcg by mouth daily, Disp: , Rfl:     Vitamin D, Cholecalciferol, 50 MCG (2000 UT) CAPS, Take by mouth daily, Disp: , Rfl:     Current Allergies     Allergies as of 03/29/2022    (No Known Allergies)            The following portions of the patient's history were reviewed and updated as appropriate: allergies, current medications, past family history, past medical history, past social history, past surgical history and problem list      Past Medical History:   Diagnosis Date    Anxiety     Cancer ([facility] [address] ) ~2009    Tonsils- followed by Oncology( Dr Phu Foster, Dr Mc Saunders)    Disease of thyroid gland        Past Surgical History:   Procedure Laterality Date    INCISION AND DRAINAGE OF WOUND Right 8/28/2021    Procedure: INCISION AND DRAINAGE (I&D) HEAD/FACE;  Surgeon: Jimena Cordero DMD;  [facility];  Service: Maxillofacial    MOUTH SURGERY      MULTIPLE TOOTH EXTRACTIONS Right 8/28/2021    Procedure: EXTRACTION TEETH 25,26,27; Surgeon: Kobi Gutierrez DMD;  [facility];  Service: marian Warren Nipple       Family History   Problem Relation Age of Onset    Cancer Mother     No Known Problems Father     Breast cancer Sister 27    No Known Problems Sister     No Known Problems Sister     No Known Problems Sister     No Known Problems Daughter     Diabetes Maternal Grandmother     No Known Problems Paternal Grandmother     No Known Problems Maternal Aunt     No Known Problems Maternal Aunt          Medications have been verified  Objective   /72   Pulse 73   Temp 99 5 °F (37 5 °C) (Tympanic)   Resp 18   Ht 5' 7 75" (1 721 m)   Wt 71 2 kg (157 lb)   SpO2 98%   BMI 24 05 kg/m²        Physical Exam     Physical Exam  Vitals and nursing note reviewed  Constitutional:       General: She is not in acute distress  Appearance: She is well-developed  She is not diaphoretic  Neck:      Thyroid: No thyromegaly  Vascular: No carotid bruit  Trachea: No tracheal deviation  Cardiovascular:      Rate and Rhythm: Normal rate and regular rhythm  Heart sounds: Normal heart sounds  No murmur heard  Pulmonary:      Effort: Pulmonary effort is normal  No respiratory distress  Breath sounds: Normal breath sounds  No wheezing  Musculoskeletal:         General: No tenderness or deformity  Normal range of motion  Cervical back: Normal range of motion and neck supple  Right lower leg: No edema  Left lower leg: No edema  Skin:     General: Skin is warm and dry  Findings: No erythema or rash  Neurological:      Mental Status: She is alert and oriented to person, place, and time  Psychiatric:         Attention and Perception: Attention normal          Mood and Affect: Mood is anxious  Speech: Speech normal          Behavior: Behavior normal  Behavior is cooperative           Thought Content: Thought content normal          Cognition and Memory: Cognition and memory normal          Judgment: Judgment normal

## 2022-04-26 ENCOUNTER — APPOINTMENT (OUTPATIENT)
Dept: LAB | Facility: HOSPITAL | Age: 72
End: 2022-04-26
Payer: COMMERCIAL

## 2022-04-26 ENCOUNTER — HOSPITAL ENCOUNTER (OUTPATIENT)
Dept: MAMMOGRAPHY | Facility: HOSPITAL | Age: 72
Discharge: HOME | End: 2022-04-26
Payer: COMMERCIAL

## 2022-04-26 VITALS — WEIGHT: 157 LBS | BODY MASS INDEX: 23.79 KG/M2 | HEIGHT: 68 IN

## 2022-04-26 DIAGNOSIS — I10 ESSENTIAL HYPERTENSION: ICD-10-CM

## 2022-04-26 DIAGNOSIS — E55.9 VITAMIN D DEFICIENCY: ICD-10-CM

## 2022-04-26 DIAGNOSIS — Z13.220 SCREENING CHOLESTEROL LEVEL: ICD-10-CM

## 2022-04-26 DIAGNOSIS — E03.9 ACQUIRED HYPOTHYROIDISM: ICD-10-CM

## 2022-04-26 DIAGNOSIS — Z12.31 ENCOUNTER FOR SCREENING MAMMOGRAM FOR MALIGNANT NEOPLASM OF BREAST: ICD-10-CM

## 2022-04-26 LAB
25(OH)D3 SERPL-MCNC: 71.8 NG/ML (ref 30–100)
ALBUMIN SERPL BCP-MCNC: 4 G/DL (ref 3.5–5)
ALP SERPL-CCNC: 72 U/L (ref 46–116)
ALT SERPL W P-5'-P-CCNC: 23 U/L (ref 12–78)
ANION GAP SERPL CALCULATED.3IONS-SCNC: 3 MMOL/L (ref 4–13)
AST SERPL W P-5'-P-CCNC: 22 U/L (ref 5–45)
BASOPHILS # BLD AUTO: 0.03 THOUSANDS/ΜL (ref 0–0.1)
BASOPHILS NFR BLD AUTO: 1 % (ref 0–1)
BILIRUB SERPL-MCNC: 0.54 MG/DL (ref 0.2–1)
BUN SERPL-MCNC: 21 MG/DL (ref 5–25)
CALCIUM SERPL-MCNC: 8.9 MG/DL (ref 8.3–10.1)
CHLORIDE SERPL-SCNC: 105 MMOL/L (ref 100–108)
CHOLEST SERPL-MCNC: 225 MG/DL
CO2 SERPL-SCNC: 27 MMOL/L (ref 21–32)
CREAT SERPL-MCNC: 0.92 MG/DL (ref 0.6–1.3)
EOSINOPHIL # BLD AUTO: 0.14 THOUSAND/ΜL (ref 0–0.61)
EOSINOPHIL NFR BLD AUTO: 4 % (ref 0–6)
ERYTHROCYTE [DISTWIDTH] IN BLOOD BY AUTOMATED COUNT: 11.6 % (ref 11.6–15.1)
GFR SERPL CREATININE-BSD FRML MDRD: 63 ML/MIN/1.73SQ M
GLUCOSE P FAST SERPL-MCNC: 104 MG/DL (ref 65–99)
HCT VFR BLD AUTO: 38.5 % (ref 34.8–46.1)
HDLC SERPL-MCNC: 76 MG/DL
HGB BLD-MCNC: 12.6 G/DL (ref 11.5–15.4)
IMM GRANULOCYTES # BLD AUTO: 0 THOUSAND/UL (ref 0–0.2)
IMM GRANULOCYTES NFR BLD AUTO: 0 % (ref 0–2)
LDLC SERPL CALC-MCNC: 132 MG/DL (ref 0–100)
LYMPHOCYTES # BLD AUTO: 0.84 THOUSANDS/ΜL (ref 0.6–4.47)
LYMPHOCYTES NFR BLD AUTO: 23 % (ref 14–44)
MCH RBC QN AUTO: 32.2 PG (ref 26.8–34.3)
MCHC RBC AUTO-ENTMCNC: 32.7 G/DL (ref 31.4–37.4)
MCV RBC AUTO: 99 FL (ref 82–98)
MONOCYTES # BLD AUTO: 0.35 THOUSAND/ΜL (ref 0.17–1.22)
MONOCYTES NFR BLD AUTO: 10 % (ref 4–12)
NEUTROPHILS # BLD AUTO: 2.32 THOUSANDS/ΜL (ref 1.85–7.62)
NEUTS SEG NFR BLD AUTO: 62 % (ref 43–75)
NONHDLC SERPL-MCNC: 149 MG/DL
NRBC BLD AUTO-RTO: 0 /100 WBCS
PLATELET # BLD AUTO: 211 THOUSANDS/UL (ref 149–390)
PMV BLD AUTO: 10.5 FL (ref 8.9–12.7)
POTASSIUM SERPL-SCNC: 4.2 MMOL/L (ref 3.5–5.3)
PROT SERPL-MCNC: 7.1 G/DL (ref 6.4–8.2)
RBC # BLD AUTO: 3.91 MILLION/UL (ref 3.81–5.12)
SODIUM SERPL-SCNC: 135 MMOL/L (ref 136–145)
TRIGL SERPL-MCNC: 86 MG/DL
TSH SERPL DL<=0.05 MIU/L-ACNC: 0.75 UIU/ML (ref 0.45–4.5)
WBC # BLD AUTO: 3.68 THOUSAND/UL (ref 4.31–10.16)

## 2022-04-26 PROCEDURE — 80061 LIPID PANEL: CPT

## 2022-04-26 PROCEDURE — 36415 COLL VENOUS BLD VENIPUNCTURE: CPT

## 2022-04-26 PROCEDURE — 84443 ASSAY THYROID STIM HORMONE: CPT

## 2022-04-26 PROCEDURE — 82306 VITAMIN D 25 HYDROXY: CPT

## 2022-04-26 PROCEDURE — 77067 SCR MAMMO BI INCL CAD: CPT

## 2022-04-26 PROCEDURE — 80053 COMPREHEN METABOLIC PANEL: CPT

## 2022-04-26 PROCEDURE — 85025 COMPLETE CBC W/AUTO DIFF WBC: CPT

## 2022-04-26 PROCEDURE — 77063 BREAST TOMOSYNTHESIS BI: CPT

## 2022-06-01 DIAGNOSIS — F33.1 MODERATE EPISODE OF RECURRENT MAJOR DEPRESSIVE DISORDER (HCC): ICD-10-CM

## 2022-06-01 RX ORDER — FLUOXETINE HYDROCHLORIDE 40 MG/1
40 CAPSULE ORAL DAILY
Qty: 90 CAPSULE | Refills: 1 | Status: SHIPPED | OUTPATIENT
Start: 2022-06-01

## 2022-08-02 ENCOUNTER — APPOINTMENT (OUTPATIENT)
Dept: LAB | Facility: CLINIC | Age: 72
End: 2022-08-02
Payer: COMMERCIAL

## 2022-08-02 ENCOUNTER — APPOINTMENT (OUTPATIENT)
Dept: RADIOLOGY | Facility: CLINIC | Age: 72
End: 2022-08-02
Payer: COMMERCIAL

## 2022-08-02 ENCOUNTER — OFFICE VISIT (OUTPATIENT)
Dept: FAMILY MEDICINE CLINIC | Facility: CLINIC | Age: 72
End: 2022-08-02
Payer: COMMERCIAL

## 2022-08-02 VITALS
WEIGHT: 158 LBS | OXYGEN SATURATION: 99 % | BODY MASS INDEX: 23.95 KG/M2 | DIASTOLIC BLOOD PRESSURE: 86 MMHG | SYSTOLIC BLOOD PRESSURE: 148 MMHG | TEMPERATURE: 98 F | HEART RATE: 67 BPM | HEIGHT: 68 IN

## 2022-08-02 DIAGNOSIS — M54.2 NECK PAIN: ICD-10-CM

## 2022-08-02 DIAGNOSIS — J34.89 RHINORRHEA: ICD-10-CM

## 2022-08-02 DIAGNOSIS — F41.8 DEPRESSION WITH ANXIETY: ICD-10-CM

## 2022-08-02 DIAGNOSIS — I10 ESSENTIAL HYPERTENSION: ICD-10-CM

## 2022-08-02 DIAGNOSIS — E04.1 THYROID NODULE: ICD-10-CM

## 2022-08-02 DIAGNOSIS — R51.9 ACUTE NONINTRACTABLE HEADACHE, UNSPECIFIED HEADACHE TYPE: Primary | ICD-10-CM

## 2022-08-02 DIAGNOSIS — R51.9 ACUTE NONINTRACTABLE HEADACHE, UNSPECIFIED HEADACHE TYPE: ICD-10-CM

## 2022-08-02 LAB
BASOPHILS # BLD AUTO: 0.04 THOUSANDS/ΜL (ref 0–0.1)
BASOPHILS NFR BLD AUTO: 1 % (ref 0–1)
EOSINOPHIL # BLD AUTO: 0.08 THOUSAND/ΜL (ref 0–0.61)
EOSINOPHIL NFR BLD AUTO: 2 % (ref 0–6)
ERYTHROCYTE [DISTWIDTH] IN BLOOD BY AUTOMATED COUNT: 11.9 % (ref 11.6–15.1)
HCT VFR BLD AUTO: 39.4 % (ref 34.8–46.1)
HGB BLD-MCNC: 12.6 G/DL (ref 11.5–15.4)
IMM GRANULOCYTES # BLD AUTO: 0.01 THOUSAND/UL (ref 0–0.2)
IMM GRANULOCYTES NFR BLD AUTO: 0 % (ref 0–2)
LYMPHOCYTES # BLD AUTO: 1.07 THOUSANDS/ΜL (ref 0.6–4.47)
LYMPHOCYTES NFR BLD AUTO: 24 % (ref 14–44)
MCH RBC QN AUTO: 32.3 PG (ref 26.8–34.3)
MCHC RBC AUTO-ENTMCNC: 32 G/DL (ref 31.4–37.4)
MCV RBC AUTO: 101 FL (ref 82–98)
MONOCYTES # BLD AUTO: 0.28 THOUSAND/ΜL (ref 0.17–1.22)
MONOCYTES NFR BLD AUTO: 6 % (ref 4–12)
NEUTROPHILS # BLD AUTO: 3 THOUSANDS/ΜL (ref 1.85–7.62)
NEUTS SEG NFR BLD AUTO: 67 % (ref 43–75)
NRBC BLD AUTO-RTO: 0 /100 WBCS
PLATELET # BLD AUTO: 252 THOUSANDS/UL (ref 149–390)
PMV BLD AUTO: 10.8 FL (ref 8.9–12.7)
RBC # BLD AUTO: 3.9 MILLION/UL (ref 3.81–5.12)
WBC # BLD AUTO: 4.48 THOUSAND/UL (ref 4.31–10.16)

## 2022-08-02 PROCEDURE — 99214 OFFICE O/P EST MOD 30 MIN: CPT | Performed by: INTERNAL MEDICINE

## 2022-08-02 PROCEDURE — 36415 COLL VENOUS BLD VENIPUNCTURE: CPT

## 2022-08-02 PROCEDURE — 1160F RVW MEDS BY RX/DR IN RCRD: CPT | Performed by: INTERNAL MEDICINE

## 2022-08-02 PROCEDURE — U0003 INFECTIOUS AGENT DETECTION BY NUCLEIC ACID (DNA OR RNA); SEVERE ACUTE RESPIRATORY SYNDROME CORONAVIRUS 2 (SARS-COV-2) (CORONAVIRUS DISEASE [COVID-19]), AMPLIFIED PROBE TECHNIQUE, MAKING USE OF HIGH THROUGHPUT TECHNOLOGIES AS DESCRIBED BY CMS-2020-01-R: HCPCS | Performed by: INTERNAL MEDICINE

## 2022-08-02 PROCEDURE — 72050 X-RAY EXAM NECK SPINE 4/5VWS: CPT

## 2022-08-02 PROCEDURE — U0005 INFEC AGEN DETEC AMPLI PROBE: HCPCS | Performed by: INTERNAL MEDICINE

## 2022-08-02 PROCEDURE — 86618 LYME DISEASE ANTIBODY: CPT

## 2022-08-02 PROCEDURE — 85025 COMPLETE CBC W/AUTO DIFF WBC: CPT

## 2022-08-02 RX ORDER — SODIUM FLUORIDE 6 MG/ML
PASTE, DENTIFRICE DENTAL
COMMUNITY
Start: 2022-07-31

## 2022-08-02 RX ORDER — METHOCARBAMOL 750 MG/1
750 TABLET, FILM COATED ORAL EVERY 6 HOURS PRN
Qty: 10 TABLET | Refills: 0 | Status: SHIPPED | OUTPATIENT
Start: 2022-08-02 | End: 2022-09-01

## 2022-08-02 RX ORDER — LORAZEPAM 2 MG/1
2 TABLET ORAL 2 TIMES DAILY PRN
Qty: 60 TABLET | Refills: 0 | Status: SHIPPED | OUTPATIENT
Start: 2022-08-03 | End: 2022-09-06 | Stop reason: SDUPTHER
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
## 2022-08-02 NOTE — PATIENT INSTRUCTIONS
Please get blood work done today, we will check for covid as well as lyme disease  Recommend heating pad, tylenol as needed for headache/neck pain  Call if symptoms worsen or don't improve  Follow up in 2 weeks for BP check

## 2022-08-03 LAB
B BURGDOR IGG+IGM SER-ACNC: <0.2 AI
SARS-COV-2 RNA RESP QL NAA+PROBE: NEGATIVE

## 2022-08-08 ENCOUNTER — HOSPITAL ENCOUNTER (OUTPATIENT)
Dept: ULTRASOUND IMAGING | Facility: HOSPITAL | Age: 72
Discharge: HOME | End: 2022-08-08
Attending: INTERNAL MEDICINE
Payer: COMMERCIAL

## 2022-08-08 DIAGNOSIS — E04.1 THYROID NODULE: ICD-10-CM

## 2022-08-08 PROCEDURE — 76536 US EXAM OF HEAD AND NECK: CPT

## 2022-08-18 ENCOUNTER — OFFICE VISIT (OUTPATIENT)
Dept: FAMILY MEDICINE CLINIC | Facility: CLINIC | Age: 72
End: 2022-08-18
Payer: COMMERCIAL

## 2022-08-18 VITALS
BODY MASS INDEX: 23.07 KG/M2 | RESPIRATION RATE: 18 BRPM | OXYGEN SATURATION: 97 % | DIASTOLIC BLOOD PRESSURE: 70 MMHG | WEIGHT: 152.2 LBS | SYSTOLIC BLOOD PRESSURE: 126 MMHG | HEIGHT: 68 IN | TEMPERATURE: 98.8 F | HEART RATE: 78 BPM

## 2022-08-18 DIAGNOSIS — M54.2 NECK PAIN: Primary | ICD-10-CM

## 2022-08-18 DIAGNOSIS — E04.1 THYROID NODULE: ICD-10-CM

## 2022-08-18 DIAGNOSIS — I10 ESSENTIAL HYPERTENSION: ICD-10-CM

## 2022-08-18 PROCEDURE — 3074F SYST BP LT 130 MM HG: CPT | Performed by: INTERNAL MEDICINE

## 2022-08-18 PROCEDURE — 3078F DIAST BP <80 MM HG: CPT | Performed by: INTERNAL MEDICINE

## 2022-08-18 PROCEDURE — 99214 OFFICE O/P EST MOD 30 MIN: CPT | Performed by: INTERNAL MEDICINE

## 2022-08-18 PROCEDURE — 1160F RVW MEDS BY RX/DR IN RCRD: CPT | Performed by: INTERNAL MEDICINE

## 2022-08-18 PROCEDURE — 3725F SCREEN DEPRESSION PERFORMED: CPT | Performed by: INTERNAL MEDICINE

## 2022-08-18 NOTE — PATIENT INSTRUCTIONS
Please continue current medications  Repeat US in 1 year for monitoring nodule   Follow up with [facility] in October as scheduled

## 2022-08-18 NOTE — PROGRESS NOTES
[facility]        NAME: Zayda Louis is a 80 y o  female  : 1950    MRN: [id number]  DATE: 2022  TIME: 8:49 AM    Assessment and Plan   1  Neck pain  - resolved, xray shows some degenerative changes, no fracture  Advised to call if symptoms return, would advise PT    2  Essential hypertension  - improved, likely elevated due to pain/anxiety  Continue lisinopril 20mg daily for now, unless SBP < 110    3  Thyroid nodule  - T4 nodule on US, will need follow up imaging in 1 year  Chief Complaint     Chief Complaint   Patient presents with    Blood Pressure Check     States she is not having any more dizzy spells  Has not been checking BP at home  History of Present Illness       70yo female with HTN, anxiety here for two week follow up  Feeling much better  Took Robaxin only 1-2 times, neck pain is better, dizziness resolved  Hasn't been motrin BP as often at home, since she's feeling better  Review of Systems   Review of Systems   Constitutional: Negative for appetite change, chills, fatigue and fever  Respiratory: Negative for cough, chest tightness and shortness of breath  Cardiovascular: Negative for chest pain, palpitations and leg swelling  Musculoskeletal: Negative for neck pain  Neurological: Negative for dizziness, weakness, light-headedness, numbness and headaches  Psychiatric/Behavioral: The patient is nervous/anxious            Current Medications       Current Outpatient Medications:     acetaminophen (TYLENOL) 325 mg tablet, Take 3 tablets (975 mg total) by mouth every 8 (eight) hours, Disp: , Rfl: 0    ascorbic acid (VITAMIN C) 1000 MG tablet, Take 1,000 mg by mouth daily, Disp: , Rfl:     aspirin (Aspirin 81) 81 mg EC tablet, Take 1 tablet (81 mg total) by mouth daily, Disp: , Rfl: 0    busPIRone (BUSPAR) 5 mg tablet, Take 1 tablet (5 mg total) by mouth 2 (two) times a day, Disp: 180 tablet, Rfl: 3    FLUoxetine (PROzac) 40 MG capsule, TAKE 1 CAPSULE (40 MG TOTAL) BY MOUTH DAILY  , Disp: 90 capsule, Rfl: 1    ibuprofen (MOTRIN) 400 mg tablet, Take 1 tablet (400 mg total) by mouth every 8 (eight) hours as needed for mild pain for up to 7 days, Disp: 21 tablet, Rfl: 0    levothyroxine 100 mcg tablet, Take 1 tablet (100 mcg total) by mouth daily, Disp: 90 tablet, Rfl: 1    lisinopril (ZESTRIL) 20 mg tablet, Take 1 tablet (20 mg total) by mouth daily, Disp: 90 tablet, Rfl: 3    LORazepam (ATIVAN) 2 mg tablet, Take 1 tablet (2 mg total) by mouth 2 (two) times a day as needed for anxiety, Disp: 60 tablet, Rfl: 0    Melatonin 10 MG TABS, Take by mouth once at bedtime, Disp: , Rfl:     methocarbamol (Robaxin-750) 750 mg tablet, Take 1 tablet (750 mg total) by mouth every 6 (six) hours as needed for muscle spasms for up to 3 days, Disp: 10 tablet, Rfl: 0    Multiple Vitamin (MULTIVITAMIN) tablet, Take 1 tablet by mouth daily, Disp: , Rfl:     TURMERIC PO, Take 1,000 mg by mouth daily, Disp: , Rfl:     vitamin B-12 (VITAMIN B-12) 500 mcg tablet, Take 5,000 mcg by mouth daily, Disp: , Rfl:     Vitamin D, Cholecalciferol, 50 MCG (2000 UT) CAPS, Take by mouth daily, Disp: , Rfl:     Sodium Fluoride 5000 PPM 1 1 % PSTE, , Disp: , Rfl:     Current Allergies     Allergies as of 08/18/2022    (No Known Allergies)            The following portions of the patient's history were reviewed and updated as appropriate: allergies, current medications, past family history, past medical history, past social history, past surgical history and problem list      Past Medical History:   Diagnosis Date    Anxiety     Cancer ([facility] [address] ) ~2009    Tonsils- followed by Oncology( Dr Shaniqua Gonzalez, Dr Will Dickey)    Disease of thyroid gland        Past Surgical History:   Procedure Laterality Date    INCISION AND DRAINAGE OF WOUND Right 8/28/2021    Procedure: INCISION AND DRAINAGE (I&D) HEAD/FACE;  Surgeon: Darren De Leon DMD;  [facility];  Service: Maxillofacial    MOUTH SURGERY      MULTIPLE TOOTH EXTRACTIONS Right 8/28/2021    Procedure: EXTRACTION TEETH 25,26,27;  Surgeon: Cedrick Suero DMD;  [facility];  Service: Benja Regine Schwartz       Family History   Problem Relation Age of Onset    Cancer Mother     No Known Problems Father     Breast cancer Sister 27    No Known Problems Sister     No Known Problems Sister     No Known Problems Sister     No Known Problems Daughter     Diabetes Maternal Grandmother     No Known Problems Paternal Grandmother     No Known Problems Maternal Aunt     No Known Problems Maternal Aunt          Medications have been verified  Objective   /70   Pulse 78   Temp 98 8 °F (37 1 °C)   Resp 18   Ht 5' 7 5" (1 715 m)   Wt 69 kg (152 lb 3 2 oz)   SpO2 97%   BMI 23 49 kg/m²        Physical Exam     Physical Exam  Vitals reviewed  Constitutional:       General: She is not in acute distress  Appearance: She is normal weight  Cardiovascular:      Rate and Rhythm: Normal rate and regular rhythm  Heart sounds: Normal heart sounds  No murmur heard  No friction rub  No gallop  Pulmonary:      Effort: Pulmonary effort is normal  No respiratory distress  Breath sounds: No wheezing, rhonchi or rales  Musculoskeletal:      Cervical back: Neck supple  No rigidity or tenderness  Neurological:      General: No focal deficit present  Mental Status: She is alert  Psychiatric:         Mood and Affect: Mood normal          Behavior: Behavior normal          Thought Content:  Thought content normal          Judgment: Judgment normal              Results:  Lab Results   Component Value Date    SODIUM 135 (L) 04/26/2022    K 4 2 04/26/2022     04/26/2022    CO2 27 04/26/2022    BUN 21 04/26/2022    CREATININE 0 92 04/26/2022    GLUC 93 08/28/2021    CALCIUM 8 9 04/26/2022       Lab Results   Component Value Date    HGBA1C 5 2 02/24/2020       Lab Results   Component Value Date    WBC 4 48 08/02/2022    HGB 12 6 08/02/2022    HCT 39 4 08/02/2022     (H) 08/02/2022     08/02/2022     Xray cervical spine 08/02/22  FINDINGS:     No fracture       Straightening of the cervical lordosis may be related to patient positioning and/or muscle spasm  No subluxation      Mild disc space narrowing suggested at C2-3 with possible partial endplate fusion  Anterior osteophytes at C6-7       The right neuroforamina are patent  Question narrowing at the left C3-4 and C6-7 neural foramina, possibly positional versus mild bony encroachment      The prevertebral soft tissues are within normal limits  Ossification noted along the lower nuchal ligament      The lung apices are clear         IMPRESSION:     No acute osseous abnormality      Degenerative changes as above      Thyroid US 08/08/22       IMPRESSION:  Stable right lobe TR 4 nodule    No nodule meets current ACR criteria for requiring biopsy but followup ultrasound is recommended in 1 year

## 2022-08-23 DIAGNOSIS — E03.9 ACQUIRED HYPOTHYROIDISM: ICD-10-CM

## 2022-08-23 RX ORDER — LEVOTHYROXINE SODIUM 0.1 MG/1
TABLET ORAL
Qty: 90 TABLET | Refills: 1 | Status: SHIPPED | OUTPATIENT
Start: 2022-08-23

## 2022-09-06 DIAGNOSIS — F41.8 DEPRESSION WITH ANXIETY: ICD-10-CM

## 2022-09-06 RX ORDER — LORAZEPAM 2 MG/1
2 TABLET ORAL 2 TIMES DAILY PRN
Qty: 60 TABLET | Refills: 0 | Status: SHIPPED | OUTPATIENT
Start: 2022-09-06 | End: 2022-10-04 | Stop reason: SDUPTHER

## 2022-09-06 NOTE — TELEPHONE ENCOUNTER
Patient requesting refill(s) of: Ativan 2 mg BID PRN    Last filled: 8/3/2022 #60 x 0  Last appt: 8/18/2022  Next appt: 10/4/2022  Pharmacy: [facility]    Patient states she is completely out of medication

## 2022-10-04 ENCOUNTER — OFFICE VISIT (OUTPATIENT)
Dept: FAMILY MEDICINE CLINIC | Facility: CLINIC | Age: 72
End: 2022-10-04
Payer: COMMERCIAL

## 2022-10-04 VITALS
WEIGHT: 156 LBS | HEIGHT: 68 IN | BODY MASS INDEX: 23.64 KG/M2 | DIASTOLIC BLOOD PRESSURE: 74 MMHG | RESPIRATION RATE: 20 BRPM | TEMPERATURE: 98.6 F | SYSTOLIC BLOOD PRESSURE: 132 MMHG | OXYGEN SATURATION: 99 % | HEART RATE: 76 BPM

## 2022-10-04 DIAGNOSIS — Z23 ENCOUNTER FOR IMMUNIZATION: ICD-10-CM

## 2022-10-04 DIAGNOSIS — Z13.220 SCREENING CHOLESTEROL LEVEL: ICD-10-CM

## 2022-10-04 DIAGNOSIS — I10 ESSENTIAL HYPERTENSION: ICD-10-CM

## 2022-10-04 DIAGNOSIS — F33.9 DEPRESSION, RECURRENT (HCC): ICD-10-CM

## 2022-10-04 DIAGNOSIS — Z12.11 SCREENING FOR COLON CANCER: ICD-10-CM

## 2022-10-04 DIAGNOSIS — E55.9 VITAMIN D DEFICIENCY: ICD-10-CM

## 2022-10-04 DIAGNOSIS — E03.9 ACQUIRED HYPOTHYROIDISM: ICD-10-CM

## 2022-10-04 DIAGNOSIS — F41.9 ANXIETY: ICD-10-CM

## 2022-10-04 DIAGNOSIS — F41.8 DEPRESSION WITH ANXIETY: Primary | ICD-10-CM

## 2022-10-04 PROCEDURE — 90662 IIV NO PRSV INCREASED AG IM: CPT

## 2022-10-04 PROCEDURE — 99214 OFFICE O/P EST MOD 30 MIN: CPT | Performed by: NURSE PRACTITIONER

## 2022-10-04 PROCEDURE — G0008 ADMIN INFLUENZA VIRUS VAC: HCPCS

## 2022-10-04 RX ORDER — BUSPIRONE HYDROCHLORIDE 5 MG/1
5 TABLET ORAL 3 TIMES DAILY
Qty: 270 TABLET | Refills: 3 | Status: SHIPPED | OUTPATIENT
Start: 2022-10-04

## 2022-10-04 RX ORDER — LORAZEPAM 2 MG/1
2 TABLET ORAL 2 TIMES DAILY PRN
Qty: 60 TABLET | Refills: 0 | Status: SHIPPED | OUTPATIENT
Start: 2022-10-04

## 2022-10-04 NOTE — PROGRESS NOTES
[facility]        NAME: Katie Nolasco is a 71 y o  female  : 1950    MRN: [id number]  DATE: 2022  TIME: 8:03 AM    Assessment and Plan   Depression with anxiety [F41 8]  1  Depression with anxiety  LORazepam (ATIVAN) 2 mg tablet   2  Screening for colon cancer  Cologuard   3  Acquired hypothyroidism  TSH, 3rd generation with Free T4 reflex   4  Essential hypertension  CBC and differential    Comprehensive metabolic panel   5  Vitamin D deficiency  Vitamin D 25 hydroxy   6  Screening cholesterol level  Lipid panel   7  Depression, recurrent ([address] )     8  Anxiety  busPIRone (BUSPAR) 5 mg tablet   9  Encounter for immunization  influenza vaccine, high-dose, PF 0 7 mL (FLUZONE HIGH-DOSE)         Patient Instructions     Patient Instructions   Try to take 1/2 tablet of Lorazepam during day and try to take less than once a day  Increase buspar to three times a day  Claritin in the morning, Benadryl at bedtime  Complete cologuard as discussed  6 month medcheck  Get labs done before next visit          Chief Complaint     Chief Complaint   Patient presents with    Follow-up    Cold Like Symptoms         History of Present Illness       Here for 6 month medcheck-  Her anxiety has been worse- taking her Ativan every night for sleep and will sometimes take in afternoon when her grandchildren come over  We discussed taking 1/2 tablet  She c/o feeling tired all the time  She is also taking Benadryl in the morning for allergy symptoms  We discussed switching to Claritin in the morning and Benadryl at bedtime  Review of Systems   Review of Systems   Constitutional: Negative for activity change, diaphoresis, fatigue and fever  HENT: Negative for congestion, facial swelling, hearing loss, rhinorrhea, sinus pressure, sinus pain, sneezing, sore throat and voice change  Eyes: Negative for discharge and visual disturbance     Respiratory: Negative for cough, choking, chest tightness, shortness of breath, wheezing and stridor  Cardiovascular: Negative for chest pain, palpitations and leg swelling  Gastrointestinal: Negative for abdominal distention, abdominal pain, constipation, diarrhea, nausea and vomiting  Endocrine: Negative for polydipsia, polyphagia and polyuria  Genitourinary: Negative for difficulty urinating, dysuria, frequency and urgency  Musculoskeletal: Negative for arthralgias, back pain, gait problem, joint swelling, myalgias, neck pain and neck stiffness  Skin: Negative for color change, rash and wound  Neurological: Negative for dizziness, syncope, speech difficulty, weakness, light-headedness and headaches  Hematological: Negative for adenopathy  Does not bruise/bleed easily  Psychiatric/Behavioral: Positive for sleep disturbance  Negative for agitation, behavioral problems, confusion, hallucinations and suicidal ideas  The patient is nervous/anxious  Current Medications       Current Outpatient Medications:     acetaminophen (TYLENOL) 325 mg tablet, Take 3 tablets (975 mg total) by mouth every 8 (eight) hours, Disp: , Rfl: 0    ascorbic acid (VITAMIN C) 1000 MG tablet, Take 1,000 mg by mouth daily, Disp: , Rfl:     aspirin (Aspirin 81) 81 mg EC tablet, Take 1 tablet (81 mg total) by mouth daily, Disp: , Rfl: 0    busPIRone (BUSPAR) 5 mg tablet, Take 1 tablet (5 mg total) by mouth 3 (three) times a day, Disp: 270 tablet, Rfl: 3    FLUoxetine (PROzac) 40 MG capsule, TAKE 1 CAPSULE (40 MG TOTAL) BY MOUTH DAILY  , Disp: 90 capsule, Rfl: 1    levothyroxine 100 mcg tablet, TAKE 1 TABLET BY MOUTH EVERY DAY, Disp: 90 tablet, Rfl: 1    lisinopril (ZESTRIL) 20 mg tablet, Take 1 tablet (20 mg total) by mouth daily, Disp: 90 tablet, Rfl: 3    LORazepam (ATIVAN) 2 mg tablet, Take 1 tablet (2 mg total) by mouth 2 (two) times a day as needed for anxiety, Disp: 60 tablet, Rfl: 0    Melatonin 10 MG TABS, Take by mouth once at bedtime, Disp: , Rfl:    Multiple Vitamin (MULTIVITAMIN) tablet, Take 1 tablet by mouth daily, Disp: , Rfl:     Sodium Fluoride 5000 PPM 1 1 % PSTE, , Disp: , Rfl:     TURMERIC PO, Take 1,000 mg by mouth daily, Disp: , Rfl:     vitamin B-12 (VITAMIN B-12) 500 mcg tablet, Take 5,000 mcg by mouth daily, Disp: , Rfl:     Vitamin D, Cholecalciferol, 50 MCG (2000 UT) CAPS, Take by mouth daily, Disp: , Rfl:     ibuprofen (MOTRIN) 400 mg tablet, Take 1 tablet (400 mg total) by mouth every 8 (eight) hours as needed for mild pain for up to 7 days, Disp: 21 tablet, Rfl: 0    methocarbamol (Robaxin-750) 750 mg tablet, Take 1 tablet (750 mg total) by mouth every 6 (six) hours as needed for muscle spasms for up to 3 days, Disp: 10 tablet, Rfl: 0    Current Allergies     Allergies as of 10/04/2022    (No Known Allergies)            The following portions of the patient's history were reviewed and updated as appropriate: allergies, current medications, past family history, past medical history, past social history, past surgical history and problem list      Past Medical History:   Diagnosis Date    Anxiety     Cancer ([facility] [address] ) ~2009    Tonsils- followed by Oncology( Dr Kyle Marquez, Dr Kym Rodriguez)    Disease of thyroid gland        Past Surgical History:   Procedure Laterality Date    INCISION AND DRAINAGE OF WOUND Right 8/28/2021    Procedure: INCISION AND DRAINAGE (I&D) HEAD/FACE;  Surgeon: Paulino Lambert DMD;  [facility];  Service: Maxillofacial    MOUTH SURGERY      MULTIPLE TOOTH EXTRACTIONS Right 8/28/2021    Procedure: EXTRACTION TEETH 25,26,27;  Surgeon: Paulino Lambert DMD;  [facility];  Service: Елена Laureano       Family History   Problem Relation Age of Onset    Cancer Mother     No Known Problems Father     Breast cancer Sister 27    No Known Problems Sister     No Known Problems Sister     No Known Problems Sister     No Known Problems Daughter     Diabetes Maternal Grandmother     No Known Problems Paternal Grandmother     No Known Problems Maternal Aunt     No Known Problems Maternal Aunt          Medications have been verified  Objective   /74   Pulse 76   Temp 98 6 °F (37 °C) (Tympanic)   Resp 20   Ht 5' 7 5" (1 715 m)   Wt 70 8 kg (156 lb)   SpO2 99%   BMI 24 07 kg/m²        Physical Exam     Physical Exam  Vitals and nursing note reviewed  Constitutional:       General: She is not in acute distress  Appearance: She is well-developed  She is not diaphoretic  Neck:      Thyroid: No thyromegaly  Trachea: No tracheal deviation  Cardiovascular:      Rate and Rhythm: Normal rate and regular rhythm  Heart sounds: Normal heart sounds  No murmur heard  Pulmonary:      Effort: Pulmonary effort is normal  No respiratory distress  Breath sounds: Normal breath sounds  No wheezing  Musculoskeletal:         General: No tenderness or deformity  Normal range of motion  Cervical back: Normal range of motion and neck supple  Skin:     General: Skin is warm and dry  Findings: No erythema or rash  Neurological:      Mental Status: She is alert and oriented to person, place, and time  Psychiatric:         Mood and Affect: Mood is anxious  Behavior: Behavior normal  Behavior is cooperative  Thought Content:  Thought content normal          Judgment: Judgment normal

## 2022-10-04 NOTE — PATIENT INSTRUCTIONS
Try to take 1/2 tablet of Lorazepam during day and try to take less than once a day  Increase buspar to three times a day  Claritin in the morning, Benadryl at bedtime  Complete cologuard as discussed  6 month medcheck  Get labs done before next visit

## 2022-10-23 LAB — COLOGUARD RESULT REPORTABLE: NEGATIVE

## 2022-12-21 ENCOUNTER — TELEPHONE (OUTPATIENT)
Dept: FAMILY MEDICINE CLINIC | Facility: CLINIC | Age: 72
End: 2022-12-21

## 2022-12-21 DIAGNOSIS — Z01.00 ROUTINE EYE EXAM: Primary | ICD-10-CM

## 2022-12-22 ENCOUNTER — OFFICE VISIT (OUTPATIENT)
Dept: FAMILY MEDICINE CLINIC | Facility: CLINIC | Age: 72
End: 2022-12-22

## 2022-12-22 VITALS
OXYGEN SATURATION: 99 % | WEIGHT: 156.4 LBS | RESPIRATION RATE: 20 BRPM | HEART RATE: 78 BPM | SYSTOLIC BLOOD PRESSURE: 136 MMHG | TEMPERATURE: 98 F | BODY MASS INDEX: 23.7 KG/M2 | HEIGHT: 68 IN | DIASTOLIC BLOOD PRESSURE: 76 MMHG

## 2022-12-22 DIAGNOSIS — M79.662 PAIN OF LEFT CALF: Primary | ICD-10-CM

## 2022-12-22 RX ORDER — TIZANIDINE 2 MG/1
2 TABLET ORAL EVERY 8 HOURS PRN
Qty: 90 TABLET | Refills: 0 | Status: SHIPPED | OUTPATIENT
Start: 2022-12-22 | End: 2022-12-30

## 2022-12-22 NOTE — PROGRESS NOTES
[facility]        NAME: Perry Herrera is a 71 y o  female  : 1950    MRN: [id number]  DATE: 2022  TIME: 10:29 AM    Assessment and Plan   Pain of left calf [M79 662]  1  Pain of left calf  tiZANidine (ZANAFLEX) 2 mg tablet            Patient Instructions     Patient Instructions   Zanaflex as directed  Ibuprofen or Aleve OTC for pain  Continue warm compresses  Increase stretching at home- offered PT referral  If symptoms worsen as discussed call office immediately or go to ER          Chief Complaint     Chief Complaint   Patient presents with   • Follow-up         History of Present Illness       Here for left lower leg pain x 2 weeks- no known injury  She reports the pain is severe at night when she's trying to sleep  She reports she has not tried anti-inflammatories or muscle relaxers  She has done some warm compresses      Review of Systems   Review of Systems   Constitutional: Negative for activity change, diaphoresis, fatigue and fever  HENT: Negative for congestion, facial swelling, hearing loss, rhinorrhea, sinus pressure, sinus pain, sneezing, sore throat and voice change  Eyes: Positive for visual disturbance (cataracts- see eye doctor)  Negative for discharge  Respiratory: Negative for cough, choking, chest tightness, shortness of breath, wheezing and stridor  Cardiovascular: Negative for chest pain, palpitations and leg swelling  Gastrointestinal: Negative for abdominal distention, abdominal pain, constipation, diarrhea, nausea and vomiting  Endocrine: Negative for polydipsia, polyphagia and polyuria  Genitourinary: Negative for difficulty urinating, dysuria, frequency and urgency  Musculoskeletal: Positive for arthralgias, gait problem (pain is sometimes worse with walking- not all the time  "stiff") and myalgias  Negative for back pain, joint swelling, neck pain and neck stiffness  Skin: Negative for color change, rash and wound  Neurological: Negative for dizziness, syncope, speech difficulty, weakness, light-headedness and headaches  Hematological: Negative for adenopathy  Does not bruise/bleed easily  Psychiatric/Behavioral: Negative for agitation, behavioral problems, confusion, hallucinations, sleep disturbance and suicidal ideas  The patient is not nervous/anxious  Current Medications       Current Outpatient Medications:   •  acetaminophen (TYLENOL) 325 mg tablet, Take 3 tablets (975 mg total) by mouth every 8 (eight) hours, Disp: , Rfl: 0  •  ascorbic acid (VITAMIN C) 1000 MG tablet, Take 1,000 mg by mouth daily, Disp: , Rfl:   •  aspirin (Aspirin 81) 81 mg EC tablet, Take 1 tablet (81 mg total) by mouth daily, Disp: , Rfl: 0  •  busPIRone (BUSPAR) 5 mg tablet, Take 1 tablet (5 mg total) by mouth 3 (three) times a day, Disp: 270 tablet, Rfl: 3  •  FLUoxetine (PROzac) 40 MG capsule, TAKE 1 CAPSULE (40 MG TOTAL) BY MOUTH DAILY  , Disp: 90 capsule, Rfl: 1  •  levothyroxine 100 mcg tablet, TAKE 1 TABLET BY MOUTH EVERY DAY, Disp: 90 tablet, Rfl: 1  •  lisinopril (ZESTRIL) 20 mg tablet, Take 1 tablet (20 mg total) by mouth daily, Disp: 90 tablet, Rfl: 3  •  LORazepam (ATIVAN) 2 mg tablet, TAKE 1 TABLET BY MOUTH 2 TIMES A DAY AS NEEDED FOR ANXIETY , Disp: 60 tablet, Rfl: 0  •  Melatonin 10 MG TABS, Take by mouth once at bedtime, Disp: , Rfl:   •  Multiple Vitamin (MULTIVITAMIN) tablet, Take 1 tablet by mouth daily, Disp: , Rfl:   •  Sodium Fluoride 5000 PPM 1 1 % PSTE, , Disp: , Rfl:   •  tiZANidine (ZANAFLEX) 2 mg tablet, Take 1 tablet (2 mg total) by mouth every 8 (eight) hours as needed for muscle spasms, Disp: 90 tablet, Rfl: 0  •  ibuprofen (MOTRIN) 400 mg tablet, Take 1 tablet (400 mg total) by mouth every 8 (eight) hours as needed for mild pain for up to 7 days, Disp: 21 tablet, Rfl: 0  •  TURMERIC PO, Take 1,000 mg by mouth daily, Disp: , Rfl:   •  vitamin B-12 (VITAMIN B-12) 500 mcg tablet, Take 5,000 mcg by mouth daily, Disp: , Rfl:   •  Vitamin D, Cholecalciferol, 50 MCG (2000 UT) CAPS, Take by mouth daily, Disp: , Rfl:     Current Allergies     Allergies as of 12/22/2022   • (No Known Allergies)            The following portions of the patient's history were reviewed and updated as appropriate: allergies, current medications, past family history, past medical history, past social history, past surgical history and problem list      Past Medical History:   Diagnosis Date   • Anxiety    • Cancer ([facility] [address] ) ~2009    Tonsils- followed by Oncology( Dr Giselle Gayle, Dr Janey Betancourt)   • Disease of thyroid gland        Past Surgical History:   Procedure Laterality Date   • INCISION AND DRAINAGE OF WOUND Right 8/28/2021    Procedure: INCISION AND DRAINAGE (I&D) HEAD/FACE;  Surgeon: Laura Rojas DMD;  [facility];  Service: Maxillofacial   • MOUTH SURGERY     • MULTIPLE TOOTH EXTRACTIONS Right 8/28/2021    Procedure: EXTRACTION TEETH 25,26,27;  Surgeon: Laura Rojas DMD;  [facility];  Service: Maxillofacial   • MYRINGOPLASTY     • Ana Lee       Family History   Problem Relation Age of Onset   • Cancer Mother    • No Known Problems Father    • Breast cancer Sister 27   • No Known Problems Sister    • No Known Problems Sister    • No Known Problems Sister    • No Known Problems Daughter    • Diabetes Maternal Grandmother    • No Known Problems Paternal Grandmother    • No Known Problems Maternal Aunt    • No Known Problems Maternal Aunt          Medications have been verified  Objective   /76   Pulse 78   Temp 98 °F (36 7 °C) (Tympanic)   Resp 20   Ht 5' 7 5" (1 715 m)   Wt 70 9 kg (156 lb 6 4 oz)   SpO2 99%   BMI 24 13 kg/m²        Physical Exam     Physical Exam  Vitals and nursing note reviewed  Constitutional:       General: She is not in acute distress  Appearance: She is well-developed  She is not diaphoretic  Neck:      Thyroid: No thyromegaly  Trachea: No tracheal deviation  Cardiovascular:      Rate and Rhythm: Normal rate and regular rhythm  Heart sounds: Normal heart sounds  No murmur heard  Pulmonary:      Effort: Pulmonary effort is normal  No respiratory distress  Breath sounds: Normal breath sounds  No wheezing  Musculoskeletal:         General: Tenderness present  No swelling or deformity  Normal range of motion  Cervical back: Normal range of motion and neck supple  Right lower leg: No swelling  No edema  Left lower leg: No swelling  No edema  Legs:       Comments: Bilateral calves the same size- 13 1/2"    Left lower extremity- no swelling, no erythema, no warmth, no ecchymosis, negative Ingrid's sign, ambulatory without difficulty, nontender with ROM or palpation    Wearing compression socks bilateral LE   Skin:     General: Skin is warm and dry  Findings: No erythema or rash  Neurological:      Mental Status: She is alert and oriented to person, place, and time  Psychiatric:         Attention and Perception: Attention normal          Mood and Affect: Mood is anxious  Behavior: Behavior normal  Behavior is cooperative  Thought Content:  Thought content normal          Cognition and Memory: Cognition and memory normal          Judgment: Judgment normal

## 2022-12-22 NOTE — PATIENT INSTRUCTIONS
Zanaflex as directed  Ibuprofen or Aleve OTC for pain  Continue warm compresses  Increase stretching at home- offered PT referral  If symptoms worsen as discussed call office immediately or go to ER

## 2023-01-03 DIAGNOSIS — F33.1 MODERATE EPISODE OF RECURRENT MAJOR DEPRESSIVE DISORDER (HCC): ICD-10-CM

## 2023-01-03 RX ORDER — FLUOXETINE HYDROCHLORIDE 40 MG/1
40 CAPSULE ORAL DAILY
Qty: 90 CAPSULE | Refills: 1 | Status: SHIPPED | OUTPATIENT
Start: 2023-01-03

## 2023-02-14 DIAGNOSIS — M79.662 PAIN OF LEFT CALF: ICD-10-CM

## 2023-02-14 DIAGNOSIS — E03.9 ACQUIRED HYPOTHYROIDISM: ICD-10-CM

## 2023-02-14 RX ORDER — LEVOTHYROXINE SODIUM 0.1 MG/1
TABLET ORAL
Qty: 90 TABLET | Refills: 1 | Status: SHIPPED | OUTPATIENT
Start: 2023-02-14

## 2023-02-14 RX ORDER — TIZANIDINE 2 MG/1
TABLET ORAL
Qty: 270 TABLET | Refills: 1 | Status: SHIPPED | OUTPATIENT
Start: 2023-02-14

## 2023-03-06 DIAGNOSIS — U07.1 COVID-19: Primary | ICD-10-CM

## 2023-03-06 RX ORDER — PREDNISONE 20 MG/1
20 TABLET ORAL 2 TIMES DAILY WITH MEALS
Qty: 10 TABLET | Refills: 0 | Status: SHIPPED | OUTPATIENT
Start: 2023-03-06

## 2023-03-06 RX ORDER — AZITHROMYCIN 250 MG/1
TABLET, FILM COATED ORAL
Qty: 6 TABLET | Refills: 0 | Status: SHIPPED | OUTPATIENT
Start: 2023-03-06 | End: 2023-03-10

## 2023-03-06 NOTE — TELEPHONE ENCOUNTER
Pt called office stating she tested herself for covid at home 2x this morning and was positive  Symptoms started Saturday  She has chills, sore throat, runny nose, body aches and headache  Taking Mucinex, Aleve and Nyquil  She is asking if you can send Rx to [facility]    Please advise

## 2023-03-06 NOTE — TELEPHONE ENCOUNTER
If she has not contraindications to Zithromax or Prednisone and wants these you can send to me for approval  Most people feel and do fine with just OTC cough/cold medications

## 2023-03-20 ENCOUNTER — TELEPHONE (OUTPATIENT)
Dept: FAMILY MEDICINE CLINIC | Facility: CLINIC | Age: 73
End: 2023-03-20

## 2023-03-20 NOTE — TELEPHONE ENCOUNTER
Her blood pressure is going up and down, she has neck pain that goes to her head and she has ear pain looking for appt with [facility] for tomorrow

## 2023-03-21 ENCOUNTER — OFFICE VISIT (OUTPATIENT)
Dept: FAMILY MEDICINE CLINIC | Facility: CLINIC | Age: 73
End: 2023-03-21

## 2023-03-21 VITALS
BODY MASS INDEX: 23.64 KG/M2 | TEMPERATURE: 99.3 F | OXYGEN SATURATION: 99 % | HEART RATE: 78 BPM | SYSTOLIC BLOOD PRESSURE: 128 MMHG | RESPIRATION RATE: 20 BRPM | WEIGHT: 156 LBS | HEIGHT: 68 IN | DIASTOLIC BLOOD PRESSURE: 74 MMHG

## 2023-03-21 DIAGNOSIS — F33.9 DEPRESSION, RECURRENT (HCC): ICD-10-CM

## 2023-03-21 DIAGNOSIS — R51.9 ACUTE INTRACTABLE HEADACHE, UNSPECIFIED HEADACHE TYPE: Primary | ICD-10-CM

## 2023-03-21 DIAGNOSIS — M54.2 NECK PAIN WITHOUT INJURY: ICD-10-CM

## 2023-03-21 DIAGNOSIS — Z82.49 FAMILY HISTORY OF ANEURYSM OF BLOOD VESSEL OF BRAIN: ICD-10-CM

## 2023-03-21 DIAGNOSIS — C09.9 MALIGNANT NEOPLASM OF TONSIL (HCC): ICD-10-CM

## 2023-03-21 NOTE — PROGRESS NOTES
[facility]        NAME: Kenny Abdalla is a 71 y o  female  : 1950    MRN: [id number]  DATE: 2023  TIME: 8:50 AM    Assessment and Plan   Acute intractable headache, unspecified headache type [R51 9]  1  Acute intractable headache, unspecified headache type  CTA head and neck w wo contrast      2  Family history of aneurysm of blood vessel of brain  CTA head and neck w wo contrast      3  Neck pain without injury  CTA head and neck w wo contrast      4  Depression, recurrent ([facility] [address] )        5  Malignant neoplasm of tonsil [facility])              Patient Instructions     Patient Instructions   CTA head/neck  Take 1mg of Ativan during day as discussed/directed for anxiety  Zanaflex at bedtime for muscle spasms  Consider chiro/massage  Keep appointment with dentist  Call for continued problems/concerns          Chief Complaint     Chief Complaint   Patient presents with   • Follow-up         History of Present Illness       Report bad headache up the back of her head x about 1 month- constant, does worsen- feels nausea when worse  Feels a "buzz" on the left side of her head- comes and goes  Reports she is feeling stressed-   Tried the muscle relaxer but "I almost passed out I was so dizzy"  She has a dental appointment after this to check her teeth then she plans to go to ENT to get checked  Lorazepam 2mg- takes at night time but has been taking 1mg during the day since she has these symptoms  She reports when she takes the 1mg during the day her symptoms improve  It helps with the headache because "it relaxes me"  Reports clicking in her neck with moving her head  She went to the chiropractor last year but is unable to get neck adjustment due to previous fractures  She reports she had 2 brothers  from brain aneurysms- she is concerned she may have this  Review of Systems   Review of Systems   HENT: Positive for dental problem and ear pain      Musculoskeletal: Positive for arthralgias and myalgias  Neurological: Positive for dizziness and headaches  Psychiatric/Behavioral: Positive for dysphoric mood  The patient is nervous/anxious  Current Medications       Current Outpatient Medications:   •  acetaminophen (TYLENOL) 325 mg tablet, Take 3 tablets (975 mg total) by mouth every 8 (eight) hours, Disp: , Rfl: 0  •  ascorbic acid (VITAMIN C) 1000 MG tablet, Take 1,000 mg by mouth daily, Disp: , Rfl:   •  aspirin (Aspirin 81) 81 mg EC tablet, Take 1 tablet (81 mg total) by mouth daily, Disp: , Rfl: 0  •  busPIRone (BUSPAR) 5 mg tablet, Take 1 tablet (5 mg total) by mouth 3 (three) times a day, Disp: 270 tablet, Rfl: 3  •  FLUoxetine (PROzac) 40 MG capsule, TAKE 1 CAPSULE (40 MG TOTAL) BY MOUTH DAILY  , Disp: 90 capsule, Rfl: 1  •  levothyroxine 100 mcg tablet, TAKE 1 TABLET BY MOUTH EVERY DAY, Disp: 90 tablet, Rfl: 1  •  lisinopril (ZESTRIL) 20 mg tablet, Take 1 tablet (20 mg total) by mouth daily, Disp: 90 tablet, Rfl: 3  •  LORazepam (ATIVAN) 2 mg tablet, TAKE 1 TABLET BY MOUTH TWICE A DAY AS NEEDED FOR ANXIETY, Disp: 60 tablet, Rfl: 5  •  Melatonin 10 MG TABS, Take by mouth once at bedtime, Disp: , Rfl:   •  Multiple Vitamin (MULTIVITAMIN) tablet, Take 1 tablet by mouth daily, Disp: , Rfl:   •  ibuprofen (MOTRIN) 400 mg tablet, Take 1 tablet (400 mg total) by mouth every 8 (eight) hours as needed for mild pain for up to 7 days, Disp: 21 tablet, Rfl: 0  •  predniSONE 20 mg tablet, Take 1 tablet (20 mg total) by mouth 2 (two) times a day with meals, Disp: 10 tablet, Rfl: 0  •  Sodium Fluoride 5000 PPM 1 1 % PSTE, , Disp: , Rfl:   •  tiZANidine (ZANAFLEX) 2 mg tablet, TAKE 1 TABLET BY MOUTH EVERY 8 HOURS AS NEEDED FOR MUSCLE SPASM, Disp: 270 tablet, Rfl: 1  •  TURMERIC PO, Take 1,000 mg by mouth daily, Disp: , Rfl:   •  vitamin B-12 (VITAMIN B-12) 500 mcg tablet, Take 5,000 mcg by mouth daily, Disp: , Rfl:   •  Vitamin D, Cholecalciferol, 50 MCG (2000 UT) CAPS, Take by mouth daily, Disp: , Rfl:     Current Allergies     Allergies as of 03/21/2023   • (No Known Allergies)            The following portions of the patient's history were reviewed and updated as appropriate: allergies, current medications, past family history, past medical history, past social history, past surgical history and problem list      Past Medical History:   Diagnosis Date   • Anxiety    • Cancer ([address] ) ~2009    Tonsils- followed by Oncology( Dr Rhea Tuttle, Dr Chaz Abebe)   • Disease of thyroid gland        Past Surgical History:   Procedure Laterality Date   • INCISION AND DRAINAGE OF WOUND Right 8/28/2021    Procedure: INCISION AND DRAINAGE (I&D) HEAD/FACE;  Surgeon: Leon Carvalho DMD;  Location: [facility];  Service: Maxillofacial   • MOUTH SURGERY     • MULTIPLE TOOTH EXTRACTIONS Right 8/28/2021    Procedure: EXTRACTION TEETH 25,26,27;  Surgeon: Leon Carvalho DMD;  Location: [facility];  Service: Maxillofacial   • MYRINGOPLASTY     • Petra Ramos      Dr Niru White       Family History   Problem Relation Age of Onset   • Cancer Mother    • No Known Problems Father    • Breast cancer Sister 27   • No Known Problems Sister    • No Known Problems Sister    • No Known Problems Sister    • No Known Problems Daughter    • Diabetes Maternal Grandmother    • No Known Problems Paternal Grandmother    • No Known Problems Maternal Aunt    • No Known Problems Maternal Aunt          Medications have been verified  Objective   /74   Pulse 78   Temp 99 3 °F (37 4 °C) (Tympanic)   Resp 20   Ht 5' 7 5" (1 715 m)   Wt 70 8 kg (156 lb)   SpO2 99%   BMI 24 07 kg/m²        Physical Exam     Physical Exam  Vitals and nursing note reviewed  Constitutional:       General: She is in acute distress (due to pain- mild)  Appearance: She is well-developed  She is not diaphoretic  HENT:      Head: Normocephalic and atraumatic        Right Ear: Tympanic membrane, ear canal and external ear normal       Left Ear: Tympanic membrane, ear canal and external ear normal       Nose: Nose normal       Mouth/Throat:      Pharynx: Uvula midline  Eyes:      General:         Right eye: No discharge  Left eye: No discharge  Extraocular Movements: Extraocular movements intact  Conjunctiva/sclera: Conjunctivae normal       Pupils: Pupils are equal, round, and reactive to light  Cardiovascular:      Rate and Rhythm: Normal rate and regular rhythm  Heart sounds: Normal heart sounds  No murmur heard  No friction rub  No gallop  Pulmonary:      Effort: Pulmonary effort is normal  No respiratory distress  Breath sounds: Normal breath sounds  No wheezing or rales  Musculoskeletal:         General: No tenderness or deformity  Normal range of motion  Cervical back: Normal range of motion  Crepitus present  Pain with movement, spinous process tenderness and muscular tenderness (bilateral) present  Normal range of motion  Lymphadenopathy:      Cervical: No cervical adenopathy  Skin:     General: Skin is warm and dry  Findings: No erythema or rash  Neurological:      Mental Status: She is alert and oriented to person, place, and time  Cranial Nerves: No cranial nerve deficit  Coordination: Coordination normal    Psychiatric:         Mood and Affect: Mood is anxious and depressed  Affect is flat  Speech: Speech normal          Behavior: Behavior normal          Thought Content:  Thought content normal          Cognition and Memory: Cognition and memory normal          Judgment: Judgment normal

## 2023-03-23 DIAGNOSIS — R51.9 ACUTE INTRACTABLE HEADACHE, UNSPECIFIED HEADACHE TYPE: Primary | ICD-10-CM

## 2023-03-24 ENCOUNTER — APPOINTMENT (OUTPATIENT)
Dept: LAB | Facility: CLINIC | Age: 73
End: 2023-03-24

## 2023-03-24 DIAGNOSIS — R51.9 ACUTE INTRACTABLE HEADACHE, UNSPECIFIED HEADACHE TYPE: ICD-10-CM

## 2023-03-24 DIAGNOSIS — Z13.220 SCREENING CHOLESTEROL LEVEL: ICD-10-CM

## 2023-03-24 DIAGNOSIS — E55.9 VITAMIN D DEFICIENCY: ICD-10-CM

## 2023-03-24 DIAGNOSIS — E03.9 ACQUIRED HYPOTHYROIDISM: ICD-10-CM

## 2023-03-24 DIAGNOSIS — I10 ESSENTIAL HYPERTENSION: ICD-10-CM

## 2023-03-24 LAB
ANION GAP SERPL CALCULATED.3IONS-SCNC: 3 MMOL/L (ref 4–13)
BUN SERPL-MCNC: 23 MG/DL (ref 5–25)
CALCIUM SERPL-MCNC: 9.4 MG/DL (ref 8.3–10.1)
CHLORIDE SERPL-SCNC: 106 MMOL/L (ref 96–108)
CO2 SERPL-SCNC: 28 MMOL/L (ref 21–32)
CREAT SERPL-MCNC: 0.85 MG/DL (ref 0.6–1.3)
GFR SERPL CREATININE-BSD FRML MDRD: 69 ML/MIN/1.73SQ M
GLUCOSE P FAST SERPL-MCNC: 91 MG/DL (ref 65–99)
POTASSIUM SERPL-SCNC: 4.3 MMOL/L (ref 3.5–5.3)
SODIUM SERPL-SCNC: 137 MMOL/L (ref 135–147)

## 2023-03-27 ENCOUNTER — HOSPITAL ENCOUNTER (OUTPATIENT)
Dept: CT IMAGING | Facility: HOSPITAL | Age: 73
Discharge: HOME | End: 2023-03-27

## 2023-03-27 DIAGNOSIS — R51.9 ACUTE INTRACTABLE HEADACHE, UNSPECIFIED HEADACHE TYPE: ICD-10-CM

## 2023-03-27 DIAGNOSIS — Z82.49 FAMILY HISTORY OF ANEURYSM OF BLOOD VESSEL OF BRAIN: ICD-10-CM

## 2023-03-27 DIAGNOSIS — M54.2 NECK PAIN WITHOUT INJURY: ICD-10-CM

## 2023-03-27 RX ADMIN — IOHEXOL 80 ML: 350 INJECTION, SOLUTION INTRAVENOUS at 18:25

## 2023-04-01 ENCOUNTER — TELEPHONE (OUTPATIENT)
Dept: OTHER | Facility: OTHER | Age: 73
End: 2023-04-01

## 2023-04-01 NOTE — TELEPHONE ENCOUNTER
Called radiology reading room and spoke to radiologist- right carotid dissection and pseudoaneurysm  PCP Eryn contacted, will call patient with findings and recommendations

## 2023-04-01 NOTE — TELEPHONE ENCOUNTER
[address] [facility] Room is requesting a call back to discuss test results         Ordering Provider: Fátima Frazier   Date Completed: 03/27/2023      Lab []  MRI []  X-Ray []  CT [x] Head and neck  Colonoscopy []  EGD []  Biopsy []  Other []

## 2023-04-03 ENCOUNTER — E-CONSULT (OUTPATIENT)
Dept: NEUROSURGERY | Facility: CLINIC | Age: 73
End: 2023-04-03

## 2023-04-03 DIAGNOSIS — I72.9 PSEUDOANEURYSM (HCC): ICD-10-CM

## 2023-04-03 DIAGNOSIS — I77.71 DISSECTION OF CAROTID ARTERY (HCC): Primary | ICD-10-CM

## 2023-04-03 DIAGNOSIS — I72.0 CAROTID PSEUDOANEURYSM (HCC): ICD-10-CM

## 2023-04-03 DIAGNOSIS — M54.2 NECK PAIN: Primary | ICD-10-CM

## 2023-04-03 NOTE — TELEPHONE ENCOUNTER
Patient notified  She would like to know if we will hear back today? Also if she can drive in the mean time?

## 2023-04-03 NOTE — PROGRESS NOTES
Patient presented with persistent right jaw pain and headache  CTA of the head and neck demonstrated possible pseudoaneurysm versus ulcerated plaque along the right internal carotid artery at bifurcation  No strokelike symptoms  Takes aspirin 81 mg at baseline  I do not believe that this likely relates to her jaw pain  As she has no strokelike symptoms at this time I do not believe any more invasive imaging or intervention is required  Rhythm or, if no recent trauma or injury I would not recommend initiation of dual antiplatelet therapies  I would recommend obtaining a subsequent CTA in 3 months to ensure stability  If stable does not require any further neurosurgical evaluation  Would recommend regular carotid monitoring with ultrasound afterwards with PCP/vascular if needed

## 2023-04-03 NOTE — TELEPHONE ENCOUNTER
It may take up to 3 days to hear back (sometimes it is the same day), she can drive though if she is feeling ok  If her symptoms are worse as we discussed Saturday when I called her then she should go to the ER

## 2023-04-03 NOTE — PROGRESS NOTES
E-Consult Consent: Patient aware and consented that a consult is being performed on their behalf to review CTA results  The patient is aware that they may not see the provider face to face, but the provider may review their medical record and give recommendations  The patient may elect to see the provider in person if requested

## 2023-04-03 NOTE — TELEPHONE ENCOUNTER
Please let her know an econsult is being done by Neurosurgery- her chart and CTA will be reviewed by a [employer]  The note will appear in her mychart but our office will also call her with the recommendations

## 2023-04-04 DIAGNOSIS — I77.71 DISSECTION OF CAROTID ARTERY (HCC): Primary | ICD-10-CM

## 2023-04-04 DIAGNOSIS — I72.9 PSEUDOANEURYSM (HCC): ICD-10-CM

## 2023-04-06 ENCOUNTER — TELEPHONE (OUTPATIENT)
Dept: FAMILY MEDICINE CLINIC | Facility: CLINIC | Age: 73
End: 2023-04-06

## 2023-04-06 DIAGNOSIS — H26.9 CATARACT, UNSPECIFIED CATARACT TYPE, UNSPECIFIED LATERALITY: Primary | ICD-10-CM

## 2023-04-06 NOTE — TELEPHONE ENCOUNTER
Received call from patient stating she needs  to  referral to see Dr Carley Crowley with [facility]  Referral placed and faxed to their office

## 2023-04-13 NOTE — PROGRESS NOTES
E-Consult  Tayo Rizvi 71 y o  female MRN: [id number]  Encounter Date: 04/13/23    Reason for Consult / Principal Problem: Pseudonaeurysm    Consulting Provider: Rosmery Woody MD    Requesting Provider: MARY Altamirano       ASSESSMENT and RECOMMENDATIONS:  Patient presented with persistent right jaw pain and headache  CTA of the head and neck demonstrated possible pseudoaneurysm versus ulcerated plaque along the right internal carotid artery at bifurcation  No strokelike symptoms  Takes aspirin 81 mg at baseline      I do not believe that this likely relates to her jaw pain  As she has no strokelike symptoms at this time I do not believe any more invasive imaging or intervention is required  Rhythm or, if no recent trauma or injury I would not recommend initiation of dual antiplatelet therapies  I would recommend obtaining a subsequent CTA in 3 months to ensure stability  If stable does not require any further neurosurgical evaluation  Would recommend regular carotid monitoring with ultrasound afterwards with PCP/vascular if needed  Total time spent 11-20 minutes, >50% of the total time devoted to medical consultative verbal/EMR discussion between providers  Written report will be generated in the EMR  Liu Bernabe

## 2023-04-27 DIAGNOSIS — F33.1 MODERATE EPISODE OF RECURRENT MAJOR DEPRESSIVE DISORDER (HCC): ICD-10-CM

## 2023-04-27 RX ORDER — FLUOXETINE HYDROCHLORIDE 40 MG/1
40 CAPSULE ORAL DAILY
Qty: 90 CAPSULE | Refills: 1 | Status: SHIPPED | OUTPATIENT
Start: 2023-04-27

## 2023-04-28 ENCOUNTER — APPOINTMENT (OUTPATIENT)
Dept: LAB | Facility: CLINIC | Age: 73
End: 2023-04-28

## 2023-04-28 LAB
25(OH)D3 SERPL-MCNC: 56.7 NG/ML (ref 30–100)
ALBUMIN SERPL BCP-MCNC: 3.8 G/DL (ref 3.5–5)
ALP SERPL-CCNC: 70 U/L (ref 46–116)
ALT SERPL W P-5'-P-CCNC: 26 U/L (ref 12–78)
ANION GAP SERPL CALCULATED.3IONS-SCNC: 3 MMOL/L (ref 4–13)
AST SERPL W P-5'-P-CCNC: 26 U/L (ref 5–45)
BASOPHILS # BLD AUTO: 0.03 THOUSANDS/ΜL (ref 0–0.1)
BASOPHILS NFR BLD AUTO: 1 % (ref 0–1)
BILIRUB SERPL-MCNC: 0.31 MG/DL (ref 0.2–1)
BUN SERPL-MCNC: 17 MG/DL (ref 5–25)
CALCIUM SERPL-MCNC: 9.2 MG/DL (ref 8.3–10.1)
CHLORIDE SERPL-SCNC: 106 MMOL/L (ref 96–108)
CHOLEST SERPL-MCNC: 231 MG/DL
CO2 SERPL-SCNC: 26 MMOL/L (ref 21–32)
CREAT SERPL-MCNC: 0.86 MG/DL (ref 0.6–1.3)
EOSINOPHIL # BLD AUTO: 0.16 THOUSAND/ΜL (ref 0–0.61)
EOSINOPHIL NFR BLD AUTO: 4 % (ref 0–6)
ERYTHROCYTE [DISTWIDTH] IN BLOOD BY AUTOMATED COUNT: 11.8 % (ref 11.6–15.1)
GFR SERPL CREATININE-BSD FRML MDRD: 68 ML/MIN/1.73SQ M
GLUCOSE P FAST SERPL-MCNC: 94 MG/DL (ref 65–99)
HCT VFR BLD AUTO: 37.5 % (ref 34.8–46.1)
HDLC SERPL-MCNC: 76 MG/DL
HGB BLD-MCNC: 12.5 G/DL (ref 11.5–15.4)
IMM GRANULOCYTES # BLD AUTO: 0.01 THOUSAND/UL (ref 0–0.2)
IMM GRANULOCYTES NFR BLD AUTO: 0 % (ref 0–2)
LDLC SERPL CALC-MCNC: 146 MG/DL (ref 0–100)
LYMPHOCYTES # BLD AUTO: 1.16 THOUSANDS/ΜL (ref 0.6–4.47)
LYMPHOCYTES NFR BLD AUTO: 31 % (ref 14–44)
MCH RBC QN AUTO: 32.6 PG (ref 26.8–34.3)
MCHC RBC AUTO-ENTMCNC: 33.3 G/DL (ref 31.4–37.4)
MCV RBC AUTO: 98 FL (ref 82–98)
MONOCYTES # BLD AUTO: 0.35 THOUSAND/ΜL (ref 0.17–1.22)
MONOCYTES NFR BLD AUTO: 9 % (ref 4–12)
NEUTROPHILS # BLD AUTO: 2.01 THOUSANDS/ΜL (ref 1.85–7.62)
NEUTS SEG NFR BLD AUTO: 55 % (ref 43–75)
NONHDLC SERPL-MCNC: 155 MG/DL
NRBC BLD AUTO-RTO: 0 /100 WBCS
PLATELET # BLD AUTO: 221 THOUSANDS/UL (ref 149–390)
PMV BLD AUTO: 11.1 FL (ref 8.9–12.7)
POTASSIUM SERPL-SCNC: 3.8 MMOL/L (ref 3.5–5.3)
PROT SERPL-MCNC: 7.1 G/DL (ref 6.4–8.4)
RBC # BLD AUTO: 3.84 MILLION/UL (ref 3.81–5.12)
SODIUM SERPL-SCNC: 135 MMOL/L (ref 135–147)
TRIGL SERPL-MCNC: 43 MG/DL
TSH SERPL DL<=0.05 MIU/L-ACNC: 0.83 UIU/ML (ref 0.45–4.5)
WBC # BLD AUTO: 3.72 THOUSAND/UL (ref 4.31–10.16)

## 2023-05-01 ENCOUNTER — OFFICE VISIT (OUTPATIENT)
Dept: FAMILY MEDICINE CLINIC | Facility: CLINIC | Age: 73
End: 2023-05-01

## 2023-05-01 VITALS
HEART RATE: 76 BPM | SYSTOLIC BLOOD PRESSURE: 132 MMHG | TEMPERATURE: 98 F | BODY MASS INDEX: 25.16 KG/M2 | OXYGEN SATURATION: 99 % | RESPIRATION RATE: 20 BRPM | HEIGHT: 68 IN | DIASTOLIC BLOOD PRESSURE: 78 MMHG | WEIGHT: 166 LBS

## 2023-05-01 DIAGNOSIS — Z78.0 MENOPAUSE: ICD-10-CM

## 2023-05-01 DIAGNOSIS — E03.9 ACQUIRED HYPOTHYROIDISM: ICD-10-CM

## 2023-05-01 DIAGNOSIS — I72.0 CAROTID PSEUDOANEURYSM (HCC): ICD-10-CM

## 2023-05-01 DIAGNOSIS — F41.9 ANXIETY: ICD-10-CM

## 2023-05-01 DIAGNOSIS — Z00.00 MEDICARE ANNUAL WELLNESS VISIT, SUBSEQUENT: Primary | ICD-10-CM

## 2023-05-01 DIAGNOSIS — I10 ESSENTIAL HYPERTENSION: ICD-10-CM

## 2023-05-01 DIAGNOSIS — Z13.820 SCREENING FOR OSTEOPOROSIS: ICD-10-CM

## 2023-05-01 DIAGNOSIS — Z12.31 ENCOUNTER FOR SCREENING MAMMOGRAM FOR MALIGNANT NEOPLASM OF BREAST: ICD-10-CM

## 2023-05-01 DIAGNOSIS — H25.89 OTHER AGE-RELATED CATARACT OF BOTH EYES: ICD-10-CM

## 2023-05-01 DIAGNOSIS — E66.3 OVERWEIGHT WITH BODY MASS INDEX (BMI) OF 25 TO 25.9 IN ADULT: ICD-10-CM

## 2023-05-01 DIAGNOSIS — Z01.818 PRE-OPERATIVE CLEARANCE: ICD-10-CM

## 2023-05-01 DIAGNOSIS — Z23 ENCOUNTER FOR IMMUNIZATION: ICD-10-CM

## 2023-05-01 NOTE — PATIENT INSTRUCTIONS
Medicare Preventive Visit Patient Instructions  Thank you for completing your Welcome to Medicare Visit or Medicare Annual Wellness Visit today  Your next wellness visit will be due in one year (5/1/2024)  The screening/preventive services that you may require over the next 5-10 years are detailed below  Some tests may not apply to you based off risk factors and/or age  Screening tests ordered at today's visit but not completed yet may show as past due  Also, please note that scanned in results may not display below  Preventive Screenings:  Service Recommendations Previous Testing/Comments   Colorectal Cancer Screening  * Colonoscopy    * Fecal Occult Blood Test (FOBT)/Fecal Immunochemical Test (FIT)  * Fecal DNA/Cologuard Test  * Flexible Sigmoidoscopy Age: 39-70 years old   Colonoscopy: every 10 years (may be performed more frequently if at higher risk)  OR  FOBT/FIT: every 1 year  OR  Cologuard: every 3 years  OR  Sigmoidoscopy: every 5 years  Screening may be recommended earlier than age 39 if at higher risk for colorectal cancer  Also, an individualized decision between you and your healthcare provider will decide whether screening between the ages of 74-80 would be appropriate  Colonoscopy: 10/20/2022  FOBT/FIT: Not on file  Cologuard: 10/20/2022  Sigmoidoscopy: Not on file    Screening Current     Breast Cancer Screening Age: 36 years old  Frequency: every 1-2 years  Not required if history of left and right mastectomy Mammogram: 04/26/2022    Screening Current   Cervical Cancer Screening Between the ages of 21-29, pap smear recommended once every 3 years  Between the ages of 33-67, can perform pap smear with HPV co-testing every 5 years     Recommendations may differ for women with a history of total hysterectomy, cervical cancer, or abnormal pap smears in past  Pap Smear: Not on file    Screening Not Indicated   Hepatitis C Screening Once for adults born between 1945 and 1965  More frequently in patients at high risk for Hepatitis C Hep C Antibody: 02/24/2020    Screening Current   Diabetes Screening 1-2 times per year if you're at risk for diabetes or have pre-diabetes Fasting glucose: 94 mg/dL (4/28/2023)  A1C: 5 2 % (2/24/2020)  Screening Current   Cholesterol Screening Once every 5 years if you don't have a lipid disorder  May order more often based on risk factors  Lipid panel: 04/28/2023    Screening Current     Other Preventive Screenings Covered by Medicare:  1  Abdominal Aortic Aneurysm (AAA) Screening: covered once if your at risk  You're considered to be at risk if you have a family history of AAA  2  Lung Cancer Screening: covers low dose CT scan once per year if you meet all of the following conditions: (1) Age 50-69; (2) No signs or symptoms of lung cancer; (3) Current smoker or have quit smoking within the last 15 years; (4) You have a tobacco smoking history of at least 20 pack years (packs per day multiplied by number of years you smoked); (5) You get a written order from a healthcare provider  3  Glaucoma Screening: covered annually if you're considered high risk: (1) You have diabetes OR (2) Family history of glaucoma OR (3)  aged 48 and older OR (3)  American aged 72 and older  3  Osteoporosis Screening: covered every 2 years if you meet one of the following conditions: (1) You're estrogen deficient and at risk for osteoporosis based off medical history and other findings; (2) Have a vertebral abnormality; (3) On glucocorticoid therapy for more than 3 months; (4) Have primary hyperparathyroidism; (5) On osteoporosis medications and need to assess response to drug therapy  · Last bone density test (DXA Scan): Not on file  5  HIV Screening: covered annually if you're between the age of 12-76  Also covered annually if you are younger than 13 and older than 72 with risk factors for HIV infection   For pregnant patients, it is covered up to 3 times per pregnancy  Immunizations:  Immunization Recommendations   Influenza Vaccine Annual influenza vaccination during flu season is recommended for all persons aged >= 6 months who do not have contraindications   Pneumococcal Vaccine   * Pneumococcal conjugate vaccine = PCV13 (Prevnar 13), PCV15 (Vaxneuvance), PCV20 (Prevnar 20)  * Pneumococcal polysaccharide vaccine = PPSV23 (Pneumovax) Adults 25-60 years old: 1-3 doses may be recommended based on certain risk factors  Adults 72 years old: 1-2 doses may be recommended based off what pneumonia vaccine you previously received   Hepatitis B Vaccine 3 dose series if at intermediate or high risk (ex: diabetes, end stage renal disease, liver disease)   Tetanus (Td) Vaccine - COST NOT COVERED BY MEDICARE PART B Following completion of primary series, a booster dose should be given every 10 years to maintain immunity against tetanus  Td may also be given as tetanus wound prophylaxis  Tdap Vaccine - COST NOT COVERED BY MEDICARE PART B Recommended at least once for all adults  For pregnant patients, recommended with each pregnancy  Shingles Vaccine (Shingrix) - COST NOT COVERED BY MEDICARE PART B  2 shot series recommended in those aged 48 and above     Health Maintenance Due:      Topic Date Due    Breast Cancer Screening: Mammogram  04/26/2023    Colorectal Cancer Screening  10/20/2025    Hepatitis C Screening  Completed     Immunizations Due:      Topic Date Due    COVID-19 Vaccine (3 - Booster for Pfizer series) 06/07/2021    Pneumococcal Vaccine: 65+ Years (2 - PPSV23 if available, else PCV20) 02/19/2022     Advance Directives   What are advance directives? Advance directives are legal documents that state your wishes and plans for medical care  These plans are made ahead of time in case you lose your ability to make decisions for yourself  Advance directives can apply to any medical decision, such as the treatments you want, and if you want to donate organs  What are the types of advance directives? There are many types of advance directives, and each state has rules about how to use them  You may choose a combination of any of the following:  · Living will: This is a written record of the treatment you want  You can also choose which treatments you do not want, which to limit, and which to stop at a certain time  This includes surgery, medicine, IV fluid, and tube feedings  · Durable power of  for healthcare [facility]): This is a written record that states who you want to make healthcare choices for you when you are unable to make them for yourself  This person, called a proxy, is usually a family member or a friend  You may choose more than 1 proxy  · Do not resuscitate (DNR) order:  A DNR order is used in case your heart stops beating or you stop breathing  It is a request not to have certain forms of treatment, such as CPR  A DNR order may be included in other types of advance directives  · Medical directive: This covers the care that you want if you are in a coma, near death, or unable to make decisions for yourself  You can list the treatments you want for each condition  Treatment may include pain medicine, surgery, blood transfusions, dialysis, IV or tube feedings, and a ventilator (breathing machine)  · Values history: This document has questions about your views, beliefs, and how you feel and think about life  This information can help others choose the care that you would choose  Why are advance directives important? An advance directive helps you control your care  Although spoken wishes may be used, it is better to have your wishes written down  Spoken wishes can be misunderstood, or not followed  Treatments may be given even if you do not want them  An advance directive may make it easier for your family to make difficult choices about your care         © Copyright [employer] 2018 Information is for End User's use only and may not be sold, redistributed or otherwise used for commercial purposes   All illustrations and images included in CareNotes® are the copyrighted property of A D A M , Inc  or [facility] Alethea Stone

## 2023-05-01 NOTE — PROGRESS NOTES
Assessment and Plan:     Problem List Items Addressed This Visit        Endocrine    Acquired hypothyroidism       Cardiovascular and Mediastinum    Essential hypertension    Carotid pseudoaneurysm (HCC)       Other    Anxiety   Other Visit Diagnoses     Medicare annual wellness visit, subsequent    -  Primary    Encounter for screening mammogram for malignant neoplasm of breast        Relevant Orders    Mammo screening bilateral w 3d & cad    Menopause        Relevant Orders    DXA bone density spine hip and pelvis    Screening for osteoporosis        Relevant Orders    DXA bone density spine hip and pelvis    Encounter for immunization        Relevant Orders    Pneumococcal Conjugate Vaccine 20-valent (Pcv20) (Completed)    Other age-related cataract of both eyes        Pre-operative clearance        Overweight with body mass index (BMI) of 25 to 25 9 in adult            BMI Counseling: Body mass index is 25 62 kg/m²  The BMI is above normal  Nutrition recommendations include decreasing portion sizes, encouraging healthy choices of fruits and vegetables, decreasing fast food intake, consuming healthier snacks, limiting drinks that contain sugar, moderation in carbohydrate intake and increasing intake of lean protein  Exercise recommendations include exercising 3-5 times per week  Rationale for BMI follow-up plan is due to patient being overweight or obese  Preventive health issues were discussed with patient, and age appropriate screening tests were ordered as noted in patient's After Visit Summary  Personalized health advice and appropriate referrals for health education or preventive services given if needed, as noted in patient's After Visit Summary  History of Present Illness:     Patient presents for a Medicare Wellness Visit    Here for medcheck, lab review, and cataract clearance  Form completed and faxed    Reviewed recent labs- all questions answered  Reviewed CTA- discussed dissection with pseudoaneurysm- is reports she continues with neck pain but believes it could be from stress/tension, the way she is sleeping  Is scheduled for 3 month f/u CTA  She verbalizes understanding of reasons to go to ER if symptoms worsen  C/o fatigue- is taking Lorazepam at night and occasionally during the day  She is not sleeping well at night     Patient Care Team:  Wanda Yost as PCP - General (Family Medicine)  Becki Wilson DO (Gastroenterology)     Review of Systems:     Review of Systems   Constitutional: Positive for activity change, fatigue and unexpected weight change  Negative for appetite change and fever  HENT: Negative for congestion, sinus pressure and sore throat  Eyes: Negative for pain, discharge and visual disturbance  Respiratory: Negative for cough, chest tightness, shortness of breath and wheezing  Cardiovascular: Negative for chest pain, palpitations and leg swelling  Gastrointestinal: Negative for abdominal distention, abdominal pain, constipation, diarrhea and nausea  Endocrine: Negative for polydipsia, polyphagia and polyuria  Genitourinary: Negative for difficulty urinating, dysuria, frequency and urgency  Musculoskeletal: Positive for arthralgias, myalgias, neck pain and neck stiffness  Negative for back pain, gait problem and joint swelling  Skin: Negative for color change  Neurological: Negative for dizziness, syncope, speech difficulty and weakness  Hematological: Negative for adenopathy  Does not bruise/bleed easily  Psychiatric/Behavioral: Positive for sleep disturbance  Negative for agitation, behavioral problems, confusion and hallucinations  The patient is nervous/anxious  Problem List:     Patient Active Problem List   Diagnosis    Overweight (BMI 25 0-29  9)    Impaired fasting glucose    Acquired hypothyroidism    Depression, recurrent (HCC)    Tympanic membrane perforation, left    Mixed conductive and sensorineural hearing loss of left ear with restricted hearing of right ear    Dental abscess    Essential hypertension    Anxiety    Thyroid nodule    Neck pain    Carotid pseudoaneurysm [facility])      Past Medical and Surgical History:     Past Medical History:   Diagnosis Date    Anxiety     Cancer [facility]) ~2009    Tonsils- followed by Oncology( Dr Pau Villanueva, Dr Wayne Carrizales)    Disease of thyroid gland      Past Surgical History:   Procedure Laterality Date    INCISION AND DRAINAGE OF WOUND Right 8/28/2021    Procedure: INCISION AND DRAINAGE (I&D) HEAD/FACE;  Surgeon: Ruthy Farias DMD;  [facility];  Service: Maxillofacial    MOUTH SURGERY      MULTIPLE TOOTH EXTRACTIONS Right 8/28/2021    Procedure: EXTRACTION TEETH 25,26,27;  Surgeon: Ruthy Farias DMD;  [facility];  Service: Neftali Pedraza      Family History:     Family History   Problem Relation Age of Onset    Cancer Mother     No Known Problems Father     Breast cancer Sister 27    No Known Problems Sister     No Known Problems Sister     No Known Problems Sister     No Known Problems Daughter     Diabetes Maternal Grandmother     No Known Problems Paternal Grandmother     No Known Problems Maternal Aunt     No Known Problems Maternal Aunt       Social History:     Social History     Socioeconomic History    Marital status:      Spouse name: None    Number of children: None    Years of education: None    Highest education level: None   Occupational History    None   Tobacco Use    Smoking status: Former     Years: 20 00     Types: Cigarettes    Smokeless tobacco: Never   Vaping Use    Vaping Use: Never used   Substance and Sexual Activity    Alcohol use:  Yes    Drug use: Never    Sexual activity: None   Other Topics Concern    None   Social History Narrative    None     Social Determinants of Health     Financial Resource Strain: Low Risk     Difficulty of Paying Living Expenses: Not very hard   Food Insecurity: Not on file   Transportation Needs: No Transportation Needs    Lack of Transportation (Medical): No    Lack of Transportation (Non-Medical): No   Physical Activity: Not on file   Stress: Not on file   Social Connections: Not on file   Intimate Partner Violence: Not on file   Housing Stability: Not on file      Medications and Allergies:     Current Outpatient Medications   Medication Sig Dispense Refill    acetaminophen (TYLENOL) 325 mg tablet Take 3 tablets (975 mg total) by mouth every 8 (eight) hours  0    ascorbic acid (VITAMIN C) 1000 MG tablet Take 1,000 mg by mouth daily      aspirin (Aspirin 81) 81 mg EC tablet Take 1 tablet (81 mg total) by mouth daily  0    busPIRone (BUSPAR) 5 mg tablet Take 1 tablet (5 mg total) by mouth 3 (three) times a day 270 tablet 3    FLUoxetine (PROzac) 40 MG capsule TAKE 1 CAPSULE (40 MG TOTAL) BY MOUTH DAILY   90 capsule 1    levothyroxine 100 mcg tablet TAKE 1 TABLET BY MOUTH EVERY DAY 90 tablet 1    lisinopril (ZESTRIL) 20 mg tablet TAKE 1 TABLET BY MOUTH EVERY DAY 90 tablet 3    LORazepam (ATIVAN) 2 mg tablet TAKE 1 TABLET BY MOUTH TWICE A DAY AS NEEDED FOR ANXIETY 60 tablet 5    Melatonin 10 MG TABS Take by mouth once at bedtime      Multiple Vitamin (MULTIVITAMIN) tablet Take 1 tablet by mouth daily      Sodium Fluoride 5000 PPM 1 1 % PSTE       tiZANidine (ZANAFLEX) 2 mg tablet TAKE 1 TABLET BY MOUTH EVERY 8 HOURS AS NEEDED FOR MUSCLE SPASM 270 tablet 1    TURMERIC PO Take 1,000 mg by mouth daily      vitamin B-12 (VITAMIN B-12) 500 mcg tablet Take 5,000 mcg by mouth daily      Vitamin D, Cholecalciferol, 50 MCG (2000 UT) CAPS Take by mouth daily      ibuprofen (MOTRIN) 400 mg tablet Take 1 tablet (400 mg total) by mouth every 8 (eight) hours as needed for mild pain for up to 7 days 21 tablet 0    predniSONE 20 mg tablet Take 1 tablet (20 mg total) by mouth 2 (two) times a day with meals 10 tablet 0     No current facility-administered medications for this visit  No Known Allergies   Immunizations:     Immunization History   Administered Date(s) Administered    COVID-19 PFIZER VACCINE 0 3 ML IM 03/22/2021, 04/12/2021    Influenza Split High Dose Preservative Free IM 11/05/2019    Influenza, high dose seasonal 0 7 mL 09/06/2020, 11/15/2021, 10/04/2022    Pneumococcal Conjugate 13-Valent 02/19/2021    Pneumococcal Conjugate Vaccine 20-valent (Pcv20), Polysace 05/01/2023    Zoster Vaccine Recombinant 09/10/2019, 12/21/2019      Health Maintenance:         Topic Date Due    Breast Cancer Screening: Mammogram  04/26/2023    Colorectal Cancer Screening  10/20/2025    Hepatitis C Screening  Completed         Topic Date Due    COVID-19 Vaccine (3 - Booster for Nain Reyna series) 06/07/2021      Medicare Screening Tests and Risk Assessments:     Imelda Caceres is here for her Subsequent Wellness visit  Health Risk Assessment:   Patient rates overall health as fair  Patient feels that their physical health rating is same  Patient is satisfied with their life  Eyesight was rated as slightly worse  Hearing was rated as slightly worse  Patient feels that their emotional and mental health rating is same  Patients states they are never, rarely angry  Patient states they are always unusually tired/fatigued  Pain experienced in the last 7 days has been a lot  Patient's pain rating has been 5/10  Patient states that she has experienced weight loss or gain in last 6 months  Depression Screening:   PHQ-9 Score: 3      Fall Risk Screening: In the past year, patient has experienced: no history of falling in past year      Urinary Incontinence Screening:   Patient has not leaked urine accidently in the last six months  Home Safety:  Patient does not have trouble with stairs inside or outside of their home  Patient has working smoke alarms and has working carbon monoxide detector   Home safety hazards "include: none  Nutrition:   Current diet is Regular  Medications:   Patient is currently taking over-the-counter supplements  OTC medications include: see medication list  Patient is able to manage medications  Activities of Daily Living (ADLs)/Instrumental Activities of Daily Living (IADLs):   Walk and transfer into and out of bed and chair?: Yes  Dress and groom yourself?: Yes    Bathe or shower yourself?: Yes    Do your laundry/housekeeping?: Yes  Manage your money, pay your bills and track your expenses?: Yes  Make your own meals?: Yes    Do your own shopping?: Yes    Previous Hospitalizations:   Any hospitalizations or ED visits within the last 12 months?: No      Advance Care Planning:   Living will: Yes    Durable POA for healthcare: Yes    Advanced directive: Yes    Advanced directive counseling given: Yes      PREVENTIVE SCREENINGS      Cardiovascular Screening:    General: Screening Current      Diabetes Screening:     General: Screening Current      Colorectal Cancer Screening:     General: Screening Current      Breast Cancer Screening:     General: Screening Current      Cervical Cancer Screening:    General: Screening Not Indicated      Lung Cancer Screening:     General: Screening Not Indicated      Hepatitis C Screening:    General: Screening Current    Screening, Brief Intervention, and Referral to Treatment (SBIRT)    Screening  Typical number of drinks in a day: 0  Typical number of drinks in a week: 0  Interpretation: Low risk drinking behavior  Single Item Drug Screening:  How often have you used an illegal drug (including marijuana) or a prescription medication for non-medical reasons in the past year? never    Single Item Drug Screen Score: 0  Interpretation: Negative screen for possible drug use disorder    No results found       Physical Exam:     /78   Pulse 76   Temp 98 °F (36 7 °C) (Tympanic)   Resp 20   Ht 5' 7 5\" (1 715 m)   Wt 75 3 kg (166 lb)   SpO2 99%   " BMI 25 62 kg/m²     Physical Exam  Vitals and nursing note reviewed  Constitutional:       General: She is not in acute distress  Appearance: Normal appearance  She is well-developed  She is not diaphoretic  HENT:      Right Ear: Tympanic membrane, ear canal and external ear normal       Left Ear: Tympanic membrane, ear canal and external ear normal       Nose: Nose normal       Mouth/Throat:      Mouth: Mucous membranes are moist       Pharynx: Oropharynx is clear  Eyes:      General:         Right eye: No discharge  Left eye: No discharge  Conjunctiva/sclera: Conjunctivae normal       Pupils: Pupils are equal, round, and reactive to light  Cardiovascular:      Rate and Rhythm: Normal rate and regular rhythm  Heart sounds: Normal heart sounds  No murmur heard  Pulmonary:      Effort: Pulmonary effort is normal  No respiratory distress  Breath sounds: Normal breath sounds  No wheezing  Abdominal:      General: Abdomen is flat  Bowel sounds are normal  There is no distension  Palpations: Abdomen is soft  There is no mass  Tenderness: There is no abdominal tenderness  Musculoskeletal:         General: Normal range of motion  Cervical back: Normal range of motion and neck supple  Skin:     General: Skin is warm and dry  Findings: No erythema or rash  Neurological:      Mental Status: She is alert and oriented to person, place, and time  Psychiatric:         Attention and Perception: Attention normal          Mood and Affect: Mood is anxious  Speech: Speech normal          Behavior: Behavior normal          Thought Content:  Thought content normal          Cognition and Memory: Cognition and memory normal          Judgment: Judgment normal           MARY Gomez

## 2023-06-22 ENCOUNTER — HOSPITAL ENCOUNTER (OUTPATIENT)
Dept: CT IMAGING | Facility: HOSPITAL | Age: 73
End: 2023-06-22
Payer: COMMERCIAL

## 2023-06-22 DIAGNOSIS — I77.71 DISSECTION OF CAROTID ARTERY (HCC): ICD-10-CM

## 2023-06-22 DIAGNOSIS — I72.9 PSEUDOANEURYSM (HCC): ICD-10-CM

## 2023-06-22 PROCEDURE — 70496 CT ANGIOGRAPHY HEAD: CPT

## 2023-06-22 PROCEDURE — G1004 CDSM NDSC: HCPCS

## 2023-06-22 PROCEDURE — 70498 CT ANGIOGRAPHY NECK: CPT

## 2023-06-22 RX ADMIN — IOHEXOL 85 ML: 350 INJECTION, SOLUTION INTRAVENOUS at 10:01

## 2023-07-11 DIAGNOSIS — F41.9 ANXIETY: ICD-10-CM

## 2023-07-12 RX ORDER — BUSPIRONE HYDROCHLORIDE 5 MG/1
TABLET ORAL
Qty: 270 TABLET | Refills: 3 | Status: SHIPPED | OUTPATIENT
Start: 2023-07-12

## 2023-07-31 ENCOUNTER — APPOINTMENT (EMERGENCY)
Dept: RADIOLOGY | Facility: HOSPITAL | Age: 73
End: 2023-07-31
Payer: COMMERCIAL

## 2023-07-31 ENCOUNTER — HOSPITAL ENCOUNTER (EMERGENCY)
Facility: HOSPITAL | Age: 73
Discharge: HOME | End: 2023-07-31
Attending: EMERGENCY MEDICINE | Admitting: EMERGENCY MEDICINE
Payer: COMMERCIAL

## 2023-07-31 VITALS
HEART RATE: 77 BPM | DIASTOLIC BLOOD PRESSURE: 66 MMHG | TEMPERATURE: 98.2 F | RESPIRATION RATE: 18 BRPM | SYSTOLIC BLOOD PRESSURE: 145 MMHG | OXYGEN SATURATION: 93 %

## 2023-07-31 DIAGNOSIS — S93.409A ANKLE SPRAIN: Primary | ICD-10-CM

## 2023-07-31 PROCEDURE — 73610 X-RAY EXAM OF ANKLE: CPT

## 2023-07-31 PROCEDURE — 99284 EMERGENCY DEPT VISIT MOD MDM: CPT | Performed by: EMERGENCY MEDICINE

## 2023-07-31 PROCEDURE — 99284 EMERGENCY DEPT VISIT MOD MDM: CPT

## 2023-07-31 NOTE — DISCHARGE INSTRUCTIONS
Use ankle brace as needed for pain, follow-up with podiatry if symptoms persist.    Ibuprofen and Tylenol for pain, ice 20 minutes every 4 hours while awake for pain.

## 2023-07-31 NOTE — ED PROVIDER NOTES
History  Chief Complaint   Patient presents with   • Ankle Injury     Patient reports a month ago she injured her left ankle by overstepping. Patient reports increased pain with a boot and was advised by her family doctor to come in for an evaluation. 69-year-old female with left ankle discomfort for the last 2 weeks. Patient states that she stepped out too hard on it and believes she rolled. She has pain worse with dorsiflexion of left ankle. No weakness or numbness. No calf tenderness or pain. No swelling. Prior to Admission Medications   Prescriptions Last Dose Informant Patient Reported? Taking? FLUoxetine (PROzac) 40 MG capsule   No No   Sig: TAKE 1 CAPSULE (40 MG TOTAL) BY MOUTH DAILY.    LORazepam (ATIVAN) 2 mg tablet   No No   Sig: Take 1 tablet (2 mg total) by mouth 2 (two) times a day as needed for anxiety   Melatonin 10 MG TABS  Self Yes No   Sig: Take by mouth once at bedtime   Multiple Vitamin (MULTIVITAMIN) tablet  Self Yes No   Sig: Take 1 tablet by mouth daily   Sodium Fluoride 5000 PPM 1.1 % PSTE  Self Yes No   TURMERIC PO  Self Yes No   Sig: Take 1,000 mg by mouth daily   Vitamin D, Cholecalciferol, 50 MCG (2000 UT) CAPS  Self Yes No   Sig: Take by mouth daily   acetaminophen (TYLENOL) 325 mg tablet  Self No No   Sig: Take 3 tablets (975 mg total) by mouth every 8 (eight) hours   ascorbic acid (VITAMIN C) 1000 MG tablet  Self Yes No   Sig: Take 1,000 mg by mouth daily   aspirin (Aspirin 81) 81 mg EC tablet  Self No No   Sig: Take 1 tablet (81 mg total) by mouth daily   busPIRone (BUSPAR) 5 mg tablet   No No   Sig: TAKE 1 TABLET BY MOUTH THREE TIMES A DAY   ibuprofen (MOTRIN) 400 mg tablet  Self No No   Sig: Take 1 tablet (400 mg total) by mouth every 8 (eight) hours as needed for mild pain for up to 7 days   levothyroxine 100 mcg tablet   No No   Sig: TAKE 1 TABLET BY MOUTH EVERY DAY   lisinopril (ZESTRIL) 20 mg tablet   No No   Sig: TAKE 1 TABLET BY MOUTH EVERY DAY   predniSONE 20 mg tablet   No No   Sig: Take 1 tablet (20 mg total) by mouth 2 (two) times a day with meals   tiZANidine (ZANAFLEX) 2 mg tablet   No No   Sig: TAKE 1 TABLET BY MOUTH EVERY 8 HOURS AS NEEDED FOR MUSCLE SPASM   vitamin B-12 (VITAMIN B-12) 500 mcg tablet  Self Yes No   Sig: Take 5,000 mcg by mouth daily      Facility-Administered Medications: None       Past Medical History:   Diagnosis Date   • Anxiety    • Cancer ([address]) ~2009    Tonsils- followed by Oncology( Dr. Riccardo Mercado, Dr. Julio César Alcantara)   • Disease of thyroid gland        Past Surgical History:   Procedure Laterality Date   • INCISION AND DRAINAGE OF WOUND Right 8/28/2021    Procedure: INCISION AND DRAINAGE (I&D) HEAD/FACE;  Surgeon: Rosaura Queen DMD;  [facility];  Service: Maxillofacial   • MOUTH SURGERY     • MULTIPLE TOOTH EXTRACTIONS Right 8/28/2021    Procedure: EXTRACTION TEETH 25,26,27;  Surgeon: Rosaura Queen DMD;  [facility];  Service: Maxillofacial   • MYRINGOPLASTY     • [facility]      Dr. Daisy Orr       Family History   Problem Relation Age of Onset   • Cancer Mother    • No Known Problems Father    • Breast cancer Sister 27   • No Known Problems Sister    • No Known Problems Sister    • No Known Problems Sister    • No Known Problems Daughter    • Diabetes Maternal Grandmother    • No Known Problems Paternal Grandmother    • No Known Problems Maternal Aunt    • No Known Problems Maternal Aunt      I have reviewed and agree with the history as documented. E-Cigarette/Vaping   • E-Cigarette Use Never User      E-Cigarette/Vaping Substances   • Nicotine No    • THC No    • CBD No    • Flavoring No    • Other No    • Unknown No      Social History     Tobacco Use   • Smoking status: Former     Years: 20.00     Types: Cigarettes   • Smokeless tobacco: Never   Vaping Use   • Vaping Use: Never used   Substance Use Topics   • Alcohol use:  Yes   • Drug use: Never       Review of Systems    Physical Exam  Physical Exam  Vitals and nursing note reviewed. Constitutional:       Appearance: She is normal weight. HENT:      Head: Normocephalic and atraumatic. Eyes:      Conjunctiva/sclera: Conjunctivae normal.      Pupils: Pupils are equal, round, and reactive to light. Cardiovascular:      Rate and Rhythm: Normal rate and regular rhythm. Pulmonary:      Effort: Pulmonary effort is normal. No respiratory distress. Abdominal:      General: Abdomen is flat. There is no distension. Musculoskeletal:         General: No swelling or deformity. Cervical back: Normal range of motion and neck supple. Comments: Tenderness over the ATF ligament, no gross instability, minimal tenderness of the distal fibula   Skin:     General: Skin is dry. Coloration: Skin is not jaundiced. Neurological:      General: No focal deficit present. Mental Status: She is alert and oriented to person, place, and time. Psychiatric:         Mood and Affect: Mood normal.         Thought Content:  Thought content normal.         Vital Signs  ED Triage Vitals [07/31/23 1104]   Temperature Pulse Respirations Blood Pressure SpO2   98.2 °F (36.8 °C) 77 18 145/66 93 %      Temp Source Heart Rate Source Patient Position - Orthostatic VS BP Location FiO2 (%)   Tympanic -- -- -- --      Pain Score       6           Vitals:    07/31/23 1104   BP: 145/66   Pulse: 77         Visual Acuity      ED Medications  Medications - No data to display    Diagnostic Studies  Results Reviewed     None                 XR ankle 3+ views LEFT    (Results Pending)              Procedures  Orthopedic injury treatment    Date/Time: 7/31/2023 12:36 PM    Performed by: Jaylene Tilley DO  Authorized by: Jaylene Tilley DO  [place] Protocol:  Risks and benefits: risks, benefits and alternatives were discussed  Consent given by: patient  Time out: Immediately prior to procedure a "time out" was called to verify the correct patient, procedure, equipment, support staff and site/side marked as required. Patient identity confirmed: verbally with patient      Immobilization:  Brace  Neurovascular status: Neurovascularly intact    Distal perfusion: normal    Neurological function: normal    Range of motion: normal    Patient tolerance:  Patient tolerated the procedure well with no immediate complications   Air stirrup splint applied, static             ED Course                               SBIRT 20yo+    Flowsheet Row Most Recent Value   Initial Alcohol Screen: US AUDIT-C     1. How often do you have a drink containing alcohol? 3 Filed at: 07/31/2023 1103   2. How many drinks containing alcohol do you have on a typical day you are drinking? 0 Filed at: 07/31/2023 1103   3a. Male UNDER 65: How often do you have five or more drinks on one occasion? 0 Filed at: 07/31/2023 1103   3b. FEMALE Any Age, or MALE 65+: How often do you have 4 or more drinks on one occassion? 0 Filed at: 07/31/2023 1103   Audit-C Score 3 Filed at: 07/31/2023 1103   [place]: How many times in the past year have you. .. Used an illegal drug or used a prescription medication for non-medical reasons? Never Filed at: 07/31/2023 1103                    Medical Decision Making  72F isolated left ankle injury  Clearly musculoskeletal  Likely sprain. Xray obtained to rule out fracture  Viewed and interpreted independently by me; no acute fracture or dislocation    Patient placed in air splint, podiatry follow-up, conservative care    Ankle sprain: acute illness or injury  Amount and/or Complexity of Data Reviewed  Radiology: ordered and independent interpretation performed. Decision-making details documented in ED Course. Disposition  Final diagnoses:    Ankle sprain     Time reflects when diagnosis was documented in both MDM as applicable and the Disposition within this note     Time User Action Codes Description Comment    7/31/2023 12:01 PM Mk Higgins [S93.409A] Ankle sprain       ED Disposition     ED Disposition   Discharge    Condition   Stable    Date/Time   Mon Jul 31, 2023 12:01 PM    Comment   David Restrepo discharge to home/self care. Follow-up Information     Follow up With Specialties Details Why [address], [address] [facility], Nurse Practitioner, Emergency Medicine Schedule an appointment as soon as possible for a visit   Kaushal Mcclendon  [phone]            Discharge Medication List as of 7/31/2023 12:15 PM      CONTINUE these medications which have NOT CHANGED    Details   acetaminophen (TYLENOL) 325 mg tablet Take 3 tablets (975 mg total) by mouth every 8 (eight) hours, Starting Mon 8/30/2021, No Print      ascorbic acid (VITAMIN C) 1000 MG tablet Take 1,000 mg by mouth daily, Historical Med      aspirin (Aspirin 81) 81 mg EC tablet Take 1 tablet (81 mg total) by mouth daily, Starting Mon 9/6/2021, No Print      busPIRone (BUSPAR) 5 mg tablet TAKE 1 TABLET BY MOUTH THREE TIMES A DAY, Normal      FLUoxetine (PROzac) 40 MG capsule TAKE 1 CAPSULE (40 MG TOTAL) BY MOUTH DAILY. , Starting Thu 4/27/2023, Normal      ibuprofen (MOTRIN) 400 mg tablet Take 1 tablet (400 mg total) by mouth every 8 (eight) hours as needed for mild pain for up to 7 days, Starting Mon 8/30/2021, Until Thu 9/1/2022 at 2359, Normal      levothyroxine 100 mcg tablet TAKE 1 TABLET BY MOUTH EVERY DAY, Normal      lisinopril (ZESTRIL) 20 mg tablet TAKE 1 TABLET BY MOUTH EVERY DAY, Normal      LORazepam (ATIVAN) 2 mg tablet Take 1 tablet (2 mg total) by mouth 2 (two) times a day as needed for anxiety, Starting Mon 6/5/2023, Normal      Melatonin 10 MG TABS Take by mouth once at bedtime, Historical Med      Multiple Vitamin (MULTIVITAMIN) tablet Take 1 tablet by mouth daily, Historical Med      predniSONE 20 mg tablet Take 1 tablet (20 mg total) by mouth 2 (two) times a day with meals, Starting Mon 3/6/2023, Normal Sodium Fluoride 5000 PPM 1.1 % PSTE Starting Sun 7/31/2022, Historical Med      tiZANidine (ZANAFLEX) 2 mg tablet TAKE 1 TABLET BY MOUTH EVERY 8 HOURS AS NEEDED FOR MUSCLE SPASM, Normal      TURMERIC PO Take 1,000 mg by mouth daily, Historical Med      vitamin B-12 (VITAMIN B-12) 500 mcg tablet Take 5,000 mcg by mouth daily, Historical Med      Vitamin D, Cholecalciferol, 50 MCG (2000 UT) CAPS Take by mouth daily, Historical Med             No discharge procedures on file.     PDMP Review       Value Time User    PDMP Reviewed  Yes 8/2/2022  9:38 AM Ngozi Pacheco MD          ED Provider  Electronically Signed by           Tree Hernandez DO  07/31/23 5962

## 2023-08-11 DIAGNOSIS — E03.9 ACQUIRED HYPOTHYROIDISM: ICD-10-CM

## 2023-08-11 DIAGNOSIS — M79.662 PAIN OF LEFT CALF: ICD-10-CM

## 2023-08-11 RX ORDER — LEVOTHYROXINE SODIUM 0.1 MG/1
TABLET ORAL
Qty: 90 TABLET | Refills: 1 | Status: SHIPPED | OUTPATIENT
Start: 2023-08-11

## 2023-08-11 RX ORDER — TIZANIDINE 2 MG/1
2 TABLET ORAL EVERY 8 HOURS PRN
Qty: 270 TABLET | Refills: 1 | Status: SHIPPED | OUTPATIENT
Start: 2023-08-11

## 2023-11-16 ENCOUNTER — VBI (OUTPATIENT)
Dept: ADMINISTRATIVE | Facility: OTHER | Age: 73
End: 2023-11-16

## 2023-11-16 NOTE — TELEPHONE ENCOUNTER
11/16/23 1:39 PM    Patient contacted (no answer/ line busy) to bring Advance Directive, POLST, or Living Will document to next scheduled pcp visit. Thank you.   Teetee Lowe [facility]

## 2023-11-20 ENCOUNTER — OFFICE VISIT (OUTPATIENT)
Dept: FAMILY MEDICINE CLINIC | Facility: CLINIC | Age: 73
End: 2023-11-20
Payer: COMMERCIAL

## 2023-11-20 VITALS
BODY MASS INDEX: 24.46 KG/M2 | OXYGEN SATURATION: 99 % | TEMPERATURE: 100 F | HEART RATE: 78 BPM | WEIGHT: 161.38 LBS | RESPIRATION RATE: 18 BRPM | DIASTOLIC BLOOD PRESSURE: 80 MMHG | SYSTOLIC BLOOD PRESSURE: 138 MMHG | HEIGHT: 68 IN

## 2023-11-20 DIAGNOSIS — Z13.220 SCREENING CHOLESTEROL LEVEL: ICD-10-CM

## 2023-11-20 DIAGNOSIS — C09.9 SQUAMOUS CELL CARCINOMA OF RIGHT TONSIL (HCC): ICD-10-CM

## 2023-11-20 DIAGNOSIS — I10 ESSENTIAL HYPERTENSION: ICD-10-CM

## 2023-11-20 DIAGNOSIS — F41.8 DEPRESSION WITH ANXIETY: Primary | ICD-10-CM

## 2023-11-20 DIAGNOSIS — E03.9 ACQUIRED HYPOTHYROIDISM: ICD-10-CM

## 2023-11-20 DIAGNOSIS — Z23 ENCOUNTER FOR IMMUNIZATION: ICD-10-CM

## 2023-11-20 DIAGNOSIS — E55.9 VITAMIN D DEFICIENCY: ICD-10-CM

## 2023-11-20 PROCEDURE — 99214 OFFICE O/P EST MOD 30 MIN: CPT | Performed by: NURSE PRACTITIONER

## 2023-11-20 RX ORDER — OFLOXACIN 3 MG/ML
SOLUTION AURICULAR (OTIC) AS NEEDED
COMMUNITY
Start: 2023-10-27

## 2023-11-20 RX ORDER — NEOMYCIN SULFATE, POLYMYXIN B SULFATE AND DEXAMETHASONE 3.5; 10000; 1 MG/ML; [USP'U]/ML; MG/ML
SUSPENSION/ DROPS OPHTHALMIC
COMMUNITY
Start: 2023-11-17

## 2023-11-20 RX ORDER — FLUOXETINE HYDROCHLORIDE 60 MG/1
60 TABLET, FILM COATED ORAL; ORAL DAILY
Qty: 90 TABLET | Refills: 3 | Status: SHIPPED | OUTPATIENT
Start: 2023-11-20

## 2023-11-20 NOTE — PROGRESS NOTES
[facility]        NAME: Arturo Butler is a 68 y.o. female  : 1950    MRN: [id number]  DATE: 2023  TIME: 9:29 AM    Assessment and Plan   Depression with anxiety [F41.8]  1. Depression with anxiety  FLUoxetine (PROzac) 60 MG TABS      2. Encounter for immunization  CANCELED: influenza vaccine, high-dose, PF 0.7 mL (FLUZONE HIGH-DOSE)      3. Acquired hypothyroidism  TSH, 3rd generation with Free T4 reflex      4. Vitamin D deficiency  Vitamin D 25 hydroxy      5. Screening cholesterol level  Lipid panel      6. Essential hypertension  Comprehensive metabolic panel    CBC and differential      7. Squamous cell carcinoma of right tonsil [facility])              Patient Instructions     Patient Instructions   Increase Fluoxetine from 40mg to 60mg daily   Continue other medications the same  Get bloodwork done when convenient- before next visit in May or sooner if not feeling well  Call sooner for problems/concerns  Will increase Lisinopril to 40 mg daily if blood pressure is high at next visit        Chief Complaint     Chief Complaint   Patient presents with    Follow-up     6 Mth. History of Present Illness       Here for 6 month medcheck-  Reports increased anxiety when her boyfriend's grandkids come to her house. She would like to increase her Prozac if possible  Recently seen by ENT- followed closely  Is taking her medications as directed  Last labs done in April        Review of Systems   Review of Systems   Constitutional:  Negative for activity change, diaphoresis, fatigue and fever. HENT:  Negative for congestion, facial swelling, hearing loss, rhinorrhea, sinus pressure, sinus pain, sneezing, sore throat and voice change. Eyes:  Negative for discharge and visual disturbance. Respiratory:  Negative for cough, choking, chest tightness, shortness of breath, wheezing and stridor. Cardiovascular:  Negative for chest pain, palpitations and leg swelling. Gastrointestinal:  Negative for abdominal distention, abdominal pain, constipation, diarrhea, nausea and vomiting. Endocrine: Negative for polydipsia, polyphagia and polyuria. Genitourinary:  Negative for difficulty urinating, dysuria, frequency and urgency. Musculoskeletal:  Negative for arthralgias, back pain, gait problem, joint swelling, myalgias, neck pain and neck stiffness. Skin:  Negative for color change, rash and wound. Neurological:  Negative for dizziness, syncope, speech difficulty, weakness, light-headedness and headaches. Hematological:  Negative for adenopathy. Does not bruise/bleed easily. Psychiatric/Behavioral:  Positive for dysphoric mood. Negative for agitation, behavioral problems, confusion, hallucinations, sleep disturbance and suicidal ideas. The patient is nervous/anxious.           Current Medications       Current Outpatient Medications:     acetaminophen (TYLENOL) 325 mg tablet, Take 3 tablets (975 mg total) by mouth every 8 (eight) hours, Disp: , Rfl: 0    ascorbic acid (VITAMIN C) 1000 MG tablet, Take 1,000 mg by mouth daily, Disp: , Rfl:     aspirin (Aspirin 81) 81 mg EC tablet, Take 1 tablet (81 mg total) by mouth daily, Disp: , Rfl: 0    busPIRone (BUSPAR) 5 mg tablet, TAKE 1 TABLET BY MOUTH THREE TIMES A DAY, Disp: 270 tablet, Rfl: 3    FLUoxetine (PROzac) 60 MG TABS, Take 1 tablet (60 mg total) by mouth daily, Disp: 90 tablet, Rfl: 3    fluticasone (FLONASE) 50 mcg/act nasal spray, SPRAY 2 SPRAYS INTO EACH NOSTRIL EVERY DAY, Disp: 48 mL, Rfl: 4    levothyroxine 100 mcg tablet, TAKE 1 TABLET BY MOUTH EVERY DAY, Disp: 90 tablet, Rfl: 1    lisinopril (ZESTRIL) 20 mg tablet, TAKE 1 TABLET BY MOUTH EVERY DAY, Disp: 90 tablet, Rfl: 3    LORazepam (ATIVAN) 2 mg tablet, Take 1 tablet (2 mg total) by mouth 2 (two) times a day as needed for anxiety, Disp: 60 tablet, Rfl: 5    Melatonin 10 MG TABS, Take by mouth once at bedtime, Disp: , Rfl:     Multiple Vitamin (MULTIVITAMIN) tablet, Take 1 tablet by mouth daily, Disp: , Rfl:     neomycin-polymyxin-dexamethasone (MAXITROL) ophthalmic suspension, INSTILL 1 DROP IN LEFT EYE TWICE DAILY, Disp: , Rfl:     ofloxacin (FLOXIN) 0.3 % otic solution, if needed, Disp: , Rfl:     prednisoLONE acetate (PRED FORTE) 1 % ophthalmic suspension, INSTILL 1 DROP 4X DAILY X2 WEEKS, 1 DROP 3X DAILY X5 DAYS, 1 DROP TWICE DAILY X1 WEEK, Disp: , Rfl:     predniSONE 20 mg tablet, Take 1 tablet (20 mg total) by mouth 2 (two) times a day with meals, Disp: 10 tablet, Rfl: 0    Sodium Fluoride 5000 PPM 1.1 % PSTE, , Disp: , Rfl:     tiZANidine (ZANAFLEX) 2 mg tablet, TAKE 1 TABLET BY MOUTH EVERY 8 HOURS AS NEEDED FOR MUSCLE SPASMS, Disp: 270 tablet, Rfl: 1    TURMERIC PO, Take 1,000 mg by mouth daily, Disp: , Rfl:     vitamin B-12 (VITAMIN B-12) 500 mcg tablet, Take 5,000 mcg by mouth daily, Disp: , Rfl:     Vitamin D, Cholecalciferol, 50 MCG (2000 UT) CAPS, Take by mouth daily, Disp: , Rfl:     ibuprofen (MOTRIN) 400 mg tablet, Take 1 tablet (400 mg total) by mouth every 8 (eight) hours as needed for mild pain for up to 7 days, Disp: 21 tablet, Rfl: 0    Current Allergies     Allergies as of 11/20/2023    (No Known Allergies)            The following portions of the patient's history were reviewed and updated as appropriate: allergies, current medications, past family history, past medical history, past social history, past surgical history and problem list.     Past Medical History:   Diagnosis Date    Anxiety     Cancer ([address]) ~2009    Tonsils- followed by Oncology( Dr. Rah Kapadia, Dr. Mckinley Nettles)    Disease of thyroid gland        Past Surgical History:   Procedure Laterality Date    INCISION AND DRAINAGE OF WOUND Right 08/28/2021    Procedure: INCISION AND DRAINAGE (I&D) HEAD/FACE;  Surgeon: Darrell Kumar DMD;  Location: [facility];  Service: Maxillofacial    MOUTH SURGERY      MULTIPLE TOOTH EXTRACTIONS Right 08/28/2021    Procedure: EXTRACTION TEETH 25,26,27; Surgeon: Argenis Friedman DMD;  Location: [facility];  Service: Maxillofacial    MYRINGOPLASTY      REFRACTIVE SURGERY Left 10/27/2023    Eduardo Laser      Dr. Leo Jordan       Family History   Problem Relation Age of Onset    Cancer Mother     No Known Problems Father     Breast cancer Sister 27    No Known Problems Sister     No Known Problems Sister     No Known Problems Sister     No Known Problems Daughter     Diabetes Maternal Grandmother     No Known Problems Paternal Grandmother     No Known Problems Maternal Aunt     No Known Problems Maternal Aunt          Medications have been verified. Objective   /80   Pulse 78   Temp 100 °F (37.8 °C)   Resp 18   Ht 5' 7.5" (1.715 m)   Wt 73.2 kg (161 lb 6 oz)   SpO2 99%   BMI 24.90 kg/m²        Physical Exam     Physical Exam  Vitals and nursing note reviewed. Constitutional:       General: She is not in acute distress. Appearance: She is well-developed. She is not diaphoretic. Neck:      Thyroid: No thyromegaly. Trachea: No tracheal deviation. Cardiovascular:      Rate and Rhythm: Normal rate and regular rhythm. Heart sounds: Normal heart sounds. No murmur heard. Pulmonary:      Effort: Pulmonary effort is normal. No respiratory distress. Breath sounds: Normal breath sounds. No wheezing. Musculoskeletal:         General: No tenderness or deformity. Normal range of motion. Cervical back: Normal range of motion and neck supple. Skin:     General: Skin is warm and dry. Findings: No erythema or rash. Neurological:      Mental Status: She is alert and oriented to person, place, and time. Psychiatric:         Attention and Perception: Attention normal.         Mood and Affect: Mood is anxious. Affect is flat. Speech: Speech normal.         Behavior: Behavior normal. Behavior is cooperative. Thought Content:  Thought content normal.         Cognition and Memory: Cognition and memory normal.         Judgment: Judgment normal.

## 2023-11-20 NOTE — PATIENT INSTRUCTIONS
Increase Fluoxetine from 40mg to 60mg daily   Continue other medications the same  Get bloodwork done when convenient- before next visit in May or sooner if not feeling well  Call sooner for problems/concerns  Will increase Lisinopril to 40 mg daily if blood pressure is high at next visit

## 2023-11-22 ENCOUNTER — CLINICAL SUPPORT (OUTPATIENT)
Dept: FAMILY MEDICINE CLINIC | Facility: CLINIC | Age: 73
End: 2023-11-22
Payer: COMMERCIAL

## 2023-11-22 DIAGNOSIS — Z23 ENCOUNTER FOR IMMUNIZATION: Primary | ICD-10-CM

## 2023-11-22 PROCEDURE — 90662 IIV NO PRSV INCREASED AG IM: CPT

## 2023-11-22 PROCEDURE — G0008 ADMIN INFLUENZA VIRUS VAC: HCPCS

## 2023-12-09 DIAGNOSIS — F41.8 DEPRESSION WITH ANXIETY: ICD-10-CM

## 2023-12-11 RX ORDER — LORAZEPAM 2 MG/1
2 TABLET ORAL 2 TIMES DAILY PRN
Qty: 60 TABLET | Refills: 1 | Status: SHIPPED | OUTPATIENT
Start: 2023-12-11

## 2024-01-23 ENCOUNTER — TELEPHONE (OUTPATIENT)
Dept: FAMILY MEDICINE CLINIC | Facility: CLINIC | Age: 74
End: 2024-01-23

## 2024-01-23 ENCOUNTER — APPOINTMENT (OUTPATIENT)
Dept: LAB | Facility: CLINIC | Age: 74
End: 2024-01-23
Payer: COMMERCIAL

## 2024-01-23 ENCOUNTER — OFFICE VISIT (OUTPATIENT)
Dept: FAMILY MEDICINE CLINIC | Facility: CLINIC | Age: 74
End: 2024-01-23
Payer: COMMERCIAL

## 2024-01-23 VITALS
TEMPERATURE: 98.6 F | BODY MASS INDEX: 23.49 KG/M2 | OXYGEN SATURATION: 97 % | DIASTOLIC BLOOD PRESSURE: 88 MMHG | SYSTOLIC BLOOD PRESSURE: 160 MMHG | HEIGHT: 68 IN | RESPIRATION RATE: 18 BRPM | HEART RATE: 77 BPM | WEIGHT: 155 LBS

## 2024-01-23 DIAGNOSIS — Z13.220 SCREENING CHOLESTEROL LEVEL: ICD-10-CM

## 2024-01-23 DIAGNOSIS — E55.9 VITAMIN D DEFICIENCY: ICD-10-CM

## 2024-01-23 DIAGNOSIS — I10 ESSENTIAL HYPERTENSION: ICD-10-CM

## 2024-01-23 DIAGNOSIS — E03.9 ACQUIRED HYPOTHYROIDISM: ICD-10-CM

## 2024-01-23 DIAGNOSIS — J01.00 ACUTE NON-RECURRENT MAXILLARY SINUSITIS: Primary | ICD-10-CM

## 2024-01-23 LAB
25(OH)D3 SERPL-MCNC: 63.8 NG/ML (ref 30–100)
ALBUMIN SERPL BCP-MCNC: 4.2 G/DL (ref 3.5–5)
ALP SERPL-CCNC: 78 U/L (ref 34–104)
ALT SERPL W P-5'-P-CCNC: 11 U/L (ref 7–52)
ANION GAP SERPL CALCULATED.3IONS-SCNC: 8 MMOL/L
AST SERPL W P-5'-P-CCNC: 16 U/L (ref 13–39)
BASOPHILS # BLD AUTO: 0.04 THOUSANDS/ÂΜL (ref 0–0.1)
BASOPHILS NFR BLD AUTO: 1 % (ref 0–1)
BILIRUB SERPL-MCNC: 0.44 MG/DL (ref 0.2–1)
BUN SERPL-MCNC: 10 MG/DL (ref 5–25)
CALCIUM SERPL-MCNC: 9.3 MG/DL (ref 8.4–10.2)
CHLORIDE SERPL-SCNC: 103 MMOL/L (ref 96–108)
CHOLEST SERPL-MCNC: 198 MG/DL
CO2 SERPL-SCNC: 29 MMOL/L (ref 21–32)
CREAT SERPL-MCNC: 0.89 MG/DL (ref 0.6–1.3)
EOSINOPHIL # BLD AUTO: 0.13 THOUSAND/ÂΜL (ref 0–0.61)
EOSINOPHIL NFR BLD AUTO: 2 % (ref 0–6)
ERYTHROCYTE [DISTWIDTH] IN BLOOD BY AUTOMATED COUNT: 11.9 % (ref 11.6–15.1)
GFR SERPL CREATININE-BSD FRML MDRD: 64 ML/MIN/1.73SQ M
GLUCOSE P FAST SERPL-MCNC: 91 MG/DL (ref 65–99)
HCT VFR BLD AUTO: 38.3 % (ref 34.8–46.1)
HDLC SERPL-MCNC: 71 MG/DL
HGB BLD-MCNC: 12.6 G/DL (ref 11.5–15.4)
IMM GRANULOCYTES # BLD AUTO: 0.02 THOUSAND/UL (ref 0–0.2)
IMM GRANULOCYTES NFR BLD AUTO: 0 % (ref 0–2)
LDLC SERPL CALC-MCNC: 108 MG/DL (ref 0–100)
LYMPHOCYTES # BLD AUTO: 0.88 THOUSANDS/ÂΜL (ref 0.6–4.47)
LYMPHOCYTES NFR BLD AUTO: 15 % (ref 14–44)
MCH RBC QN AUTO: 32.5 PG (ref 26.8–34.3)
MCHC RBC AUTO-ENTMCNC: 32.9 G/DL (ref 31.4–37.4)
MCV RBC AUTO: 99 FL (ref 82–98)
MONOCYTES # BLD AUTO: 0.32 THOUSAND/ÂΜL (ref 0.17–1.22)
MONOCYTES NFR BLD AUTO: 5 % (ref 4–12)
NEUTROPHILS # BLD AUTO: 4.49 THOUSANDS/ÂΜL (ref 1.85–7.62)
NEUTS SEG NFR BLD AUTO: 77 % (ref 43–75)
NONHDLC SERPL-MCNC: 127 MG/DL
NRBC BLD AUTO-RTO: 0 /100 WBCS
PLATELET # BLD AUTO: 245 THOUSANDS/UL (ref 149–390)
PMV BLD AUTO: 11 FL (ref 8.9–12.7)
POTASSIUM SERPL-SCNC: 3.9 MMOL/L (ref 3.5–5.3)
PROT SERPL-MCNC: 7.7 G/DL (ref 6.4–8.4)
RBC # BLD AUTO: 3.88 MILLION/UL (ref 3.81–5.12)
SODIUM SERPL-SCNC: 140 MMOL/L (ref 135–147)
TRIGL SERPL-MCNC: 96 MG/DL
TSH SERPL DL<=0.05 MIU/L-ACNC: 0.83 UIU/ML (ref 0.45–4.5)
WBC # BLD AUTO: 5.88 THOUSAND/UL (ref 4.31–10.16)

## 2024-01-23 PROCEDURE — 80061 LIPID PANEL: CPT

## 2024-01-23 PROCEDURE — 82306 VITAMIN D 25 HYDROXY: CPT

## 2024-01-23 PROCEDURE — 85025 COMPLETE CBC W/AUTO DIFF WBC: CPT

## 2024-01-23 PROCEDURE — 36415 COLL VENOUS BLD VENIPUNCTURE: CPT

## 2024-01-23 PROCEDURE — 84443 ASSAY THYROID STIM HORMONE: CPT

## 2024-01-23 PROCEDURE — 80053 COMPREHEN METABOLIC PANEL: CPT

## 2024-01-23 PROCEDURE — 99213 OFFICE O/P EST LOW 20 MIN: CPT | Performed by: NURSE PRACTITIONER

## 2024-01-23 RX ORDER — AZITHROMYCIN 250 MG/1
TABLET, FILM COATED ORAL
Qty: 6 TABLET | Refills: 0 | Status: SHIPPED | OUTPATIENT
Start: 2024-01-23 | End: 2024-01-27

## 2024-01-23 NOTE — PROGRESS NOTES
Name: Radha Saini      : 1950      MRN: [id number]  Encounter Provider: MARY Gastelum  Encounter Date: 2024   Encounter department: [facility]    Assessment & Plan     1. Acute non-recurrent maxillary sinusitis  -     azithromycin (ZITHROMAX) 250 mg tablet; Take 2 tablets today then 1 tablet daily x 4 days        Depression Screening and Follow-up Plan: Patient was screened for depression during today's encounter. They screened negative with a PHQ-9 score of 0.        Subjective      Patient is a 74y/o female, presenting for s cough and congestion    About a week ago, started with HA, sore throat, congestion, and cough. Lots of mucus. Taking benadryl and tylenol. Recently started mucinex now. Denies fevers, chills, nausea, or diarrhea. Has nasal spray at home.       Review of Systems   Constitutional:  Negative for activity change, diaphoresis, fatigue and fever.   HENT:  Positive for congestion, ear pain, sinus pressure, sinus pain and sore throat. Negative for facial swelling, hearing loss, rhinorrhea, sneezing and voice change.    Eyes:  Negative for discharge and visual disturbance.   Respiratory:  Positive for cough. Negative for choking, chest tightness, shortness of breath, wheezing and stridor.    Cardiovascular:  Negative for chest pain, palpitations and leg swelling.   Gastrointestinal:  Negative for abdominal distention, abdominal pain, constipation, diarrhea, nausea and vomiting.   Endocrine: Negative for polydipsia, polyphagia and polyuria.   Genitourinary:  Negative for difficulty urinating, dysuria, frequency and urgency.   Musculoskeletal:  Negative for arthralgias, back pain, gait problem, joint swelling, myalgias, neck pain and neck stiffness.   Skin:  Negative for color change, rash and wound.   Neurological:  Negative for dizziness, syncope, speech difficulty, weakness, light-headedness and headaches.   Hematological:  Negative for adenopathy. Does not  bruise/bleed easily.   Psychiatric/Behavioral:  Negative for agitation, behavioral problems, confusion, hallucinations, sleep disturbance and suicidal ideas. The patient is not nervous/anxious.    All other systems reviewed and are negative.      Current Outpatient Medications on File Prior to Visit   Medication Sig    acetaminophen (TYLENOL) 325 mg tablet Take 3 tablets (975 mg total) by mouth every 8 (eight) hours    ascorbic acid (VITAMIN C) 1000 MG tablet Take 1,000 mg by mouth daily    aspirin (Aspirin 81) 81 mg EC tablet Take 1 tablet (81 mg total) by mouth daily    busPIRone (BUSPAR) 5 mg tablet TAKE 1 TABLET BY MOUTH THREE TIMES A DAY    FLUoxetine (PROzac) 60 MG TABS Take 1 tablet (60 mg total) by mouth daily    fluticasone (FLONASE) 50 mcg/act nasal spray SPRAY 2 SPRAYS INTO EACH NOSTRIL EVERY DAY    levothyroxine 100 mcg tablet TAKE 1 TABLET BY MOUTH EVERY DAY    lisinopril (ZESTRIL) 20 mg tablet TAKE 1 TABLET BY MOUTH EVERY DAY    LORazepam (ATIVAN) 2 mg tablet TAKE 1 TABLET BY MOUTH 2 TIMES A DAY AS NEEDED FOR ANXIETY.    Melatonin 10 MG TABS Take by mouth once at bedtime    Multiple Vitamin (MULTIVITAMIN) tablet Take 1 tablet by mouth daily    ofloxacin (FLOXIN) 0.3 % otic solution Administer 5 drops into the left ear 2 (two) times a day As needed for otorrhea    prednisoLONE acetate (PRED FORTE) 1 % ophthalmic suspension INSTILL 1 DROP 4X DAILY X2 WEEKS, 1 DROP 3X DAILY X5 DAYS, 1 DROP TWICE DAILY X1 WEEK    predniSONE 20 mg tablet Take 1 tablet (20 mg total) by mouth 2 (two) times a day with meals    Sodium Fluoride 5000 PPM 1.1 % PSTE     tiZANidine (ZANAFLEX) 2 mg tablet TAKE 1 TABLET BY MOUTH EVERY 8 HOURS AS NEEDED FOR MUSCLE SPASMS    TURMERIC PO Take 1,000 mg by mouth daily    vitamin B-12 (VITAMIN B-12) 500 mcg tablet Take 5,000 mcg by mouth daily    Vitamin D, Cholecalciferol, 50 MCG (2000 UT) CAPS Take by mouth daily    ibuprofen (MOTRIN) 400 mg tablet Take 1 tablet (400 mg total) by mouth  "every 8 (eight) hours as needed for mild pain for up to 7 days       Objective     /88   Pulse 77   Temp 98.6 °F (37 °C) (Tympanic)   Resp 18   Ht 5' 7.5\" (1.715 m)   Wt 70.3 kg (155 lb)   SpO2 97%   BMI 23.92 kg/m²     Physical Exam  Vitals and nursing note reviewed.   Constitutional:       General: She is not in acute distress.     Appearance: She is well-developed. She is not diaphoretic.   HENT:      Right Ear: Tympanic membrane, ear canal and external ear normal.      Left Ear: Ear canal and external ear normal.      Nose: Congestion present.      Mouth/Throat:      Pharynx: Oropharyngeal exudate present.   Eyes:      Conjunctiva/sclera: Conjunctivae normal.   Neck:      Thyroid: No thyromegaly.      Trachea: No tracheal deviation.   Cardiovascular:      Rate and Rhythm: Normal rate and regular rhythm.      Heart sounds: Normal heart sounds. No murmur heard.  Pulmonary:      Effort: Pulmonary effort is normal. No respiratory distress.      Breath sounds: Normal breath sounds. No wheezing.   Musculoskeletal:         General: No tenderness or deformity. Normal range of motion.      Cervical back: Normal range of motion and neck supple.   Skin:     General: Skin is warm and dry.      Findings: No erythema or rash.   Neurological:      Mental Status: She is alert and oriented to person, place, and time.   Psychiatric:         Mood and Affect: Mood normal.         Behavior: Behavior normal. Behavior is cooperative.         Thought Content: Thought content normal.         Judgment: Judgment normal.       MARY Gastelum    "

## 2024-01-23 NOTE — TELEPHONE ENCOUNTER
Pt wants to see Eryn today she is coughing up green and she has a sinus infection, I did offer her an opening with another provider today but she wants Eryn

## 2024-02-04 DIAGNOSIS — E03.9 ACQUIRED HYPOTHYROIDISM: ICD-10-CM

## 2024-02-04 DIAGNOSIS — M79.662 PAIN OF LEFT CALF: ICD-10-CM

## 2024-02-05 ENCOUNTER — HOSPITAL ENCOUNTER (OUTPATIENT)
Dept: BONE DENSITY | Facility: HOSPITAL | Age: 74
Discharge: HOME | End: 2024-02-05
Payer: COMMERCIAL

## 2024-02-05 ENCOUNTER — HOSPITAL ENCOUNTER (OUTPATIENT)
Dept: MAMMOGRAPHY | Facility: HOSPITAL | Age: 74
Discharge: HOME | End: 2024-02-05
Payer: COMMERCIAL

## 2024-02-05 VITALS — WEIGHT: 151 LBS | BODY MASS INDEX: 22.88 KG/M2 | HEIGHT: 68 IN

## 2024-02-05 VITALS — WEIGHT: 155 LBS | HEIGHT: 68 IN | BODY MASS INDEX: 23.49 KG/M2

## 2024-02-05 DIAGNOSIS — Z78.0 MENOPAUSE: ICD-10-CM

## 2024-02-05 DIAGNOSIS — Z12.31 ENCOUNTER FOR SCREENING MAMMOGRAM FOR MALIGNANT NEOPLASM OF BREAST: ICD-10-CM

## 2024-02-05 DIAGNOSIS — Z13.820 SCREENING FOR OSTEOPOROSIS: ICD-10-CM

## 2024-02-05 DIAGNOSIS — I10 HYPERTENSION, UNSPECIFIED TYPE: ICD-10-CM

## 2024-02-05 PROCEDURE — 77080 DXA BONE DENSITY AXIAL: CPT

## 2024-02-05 PROCEDURE — 77067 SCR MAMMO BI INCL CAD: CPT

## 2024-02-05 PROCEDURE — 77063 BREAST TOMOSYNTHESIS BI: CPT

## 2024-02-05 RX ORDER — LISINOPRIL 20 MG/1
TABLET ORAL
Qty: 90 TABLET | Refills: 3 | Status: SHIPPED | OUTPATIENT
Start: 2024-02-05

## 2024-02-05 RX ORDER — TIZANIDINE 2 MG/1
TABLET ORAL
Qty: 270 TABLET | Refills: 1 | Status: SHIPPED | OUTPATIENT
Start: 2024-02-05

## 2024-02-05 RX ORDER — LEVOTHYROXINE SODIUM 0.1 MG/1
TABLET ORAL
Qty: 90 TABLET | Refills: 1 | Status: SHIPPED | OUTPATIENT
Start: 2024-02-05

## 2024-02-05 NOTE — TELEPHONE ENCOUNTER
Patient requesting refill(s) of: Levothyroxine     Last filled: 8/11/23  Last appt: 1/23/24  Next appt: 5/2/24  Pharmacy: [facility]       Patient requesting refill(s) of: Tizanidine     Last filled: 8/11/23

## 2024-02-05 NOTE — TELEPHONE ENCOUNTER
Patient requesting refill(s) of: Lisinopril     Last filled: 4/19/23  Last appt: 1/23/24  Next appt: 5/2/24  Pharmacy: [facility]

## 2024-02-26 DIAGNOSIS — F41.8 DEPRESSION WITH ANXIETY: ICD-10-CM

## 2024-02-26 RX ORDER — LORAZEPAM 2 MG/1
2 TABLET ORAL 2 TIMES DAILY PRN
Qty: 60 TABLET | Refills: 1 | Status: SHIPPED | OUTPATIENT
Start: 2024-02-26

## 2024-02-26 NOTE — TELEPHONE ENCOUNTER
Patient requesting refill(s) of: Lorazepam     Last filled: 12/11/23  Last appt: 1/23/24  Next appt: 5/2/24  Pharmacy: [facility]

## 2024-04-25 ENCOUNTER — RA CDI HCC (OUTPATIENT)
Dept: OTHER | Facility: HOSPITAL | Age: 74
End: 2024-04-25

## 2024-05-01 ENCOUNTER — TELEPHONE (OUTPATIENT)
Dept: ADMINISTRATIVE | Facility: OTHER | Age: 74
End: 2024-05-01

## 2024-05-01 NOTE — TELEPHONE ENCOUNTER
05/01/24 11:19 AM    Patient contacted (left message) to bring Advance Directive, POLST, or Living Will document to next scheduled pcp visit.    Thank you.  Yudith Smith MA  [facility]

## 2024-05-02 ENCOUNTER — OFFICE VISIT (OUTPATIENT)
Dept: FAMILY MEDICINE CLINIC | Facility: CLINIC | Age: 74
End: 2024-05-02
Payer: COMMERCIAL

## 2024-05-02 VITALS
SYSTOLIC BLOOD PRESSURE: 150 MMHG | HEART RATE: 79 BPM | DIASTOLIC BLOOD PRESSURE: 78 MMHG | OXYGEN SATURATION: 98 % | BODY MASS INDEX: 22.88 KG/M2 | TEMPERATURE: 98 F | HEIGHT: 68 IN | WEIGHT: 151 LBS

## 2024-05-02 DIAGNOSIS — Z00.00 MEDICARE ANNUAL WELLNESS VISIT, SUBSEQUENT: Primary | ICD-10-CM

## 2024-05-02 DIAGNOSIS — F41.8 DEPRESSION WITH ANXIETY: ICD-10-CM

## 2024-05-02 DIAGNOSIS — Z13.220 SCREENING CHOLESTEROL LEVEL: ICD-10-CM

## 2024-05-02 DIAGNOSIS — D53.9 DEFICIENCY ANEMIA: ICD-10-CM

## 2024-05-02 DIAGNOSIS — E55.9 VITAMIN D DEFICIENCY: ICD-10-CM

## 2024-05-02 DIAGNOSIS — E03.9 ACQUIRED HYPOTHYROIDISM: ICD-10-CM

## 2024-05-02 DIAGNOSIS — I77.71 DISSECTION OF CAROTID ARTERY (HCC): ICD-10-CM

## 2024-05-02 DIAGNOSIS — C09.9 SQUAMOUS CELL CARCINOMA OF RIGHT TONSIL (HCC): ICD-10-CM

## 2024-05-02 DIAGNOSIS — F33.9 DEPRESSION, RECURRENT (HCC): ICD-10-CM

## 2024-05-02 DIAGNOSIS — I72.9 PSEUDOANEURYSM (HCC): ICD-10-CM

## 2024-05-02 DIAGNOSIS — R53.83 OTHER FATIGUE: ICD-10-CM

## 2024-05-02 DIAGNOSIS — F41.9 ANXIETY: ICD-10-CM

## 2024-05-02 PROCEDURE — G0439 PPPS, SUBSEQ VISIT: HCPCS | Performed by: NURSE PRACTITIONER

## 2024-05-02 PROCEDURE — 99214 OFFICE O/P EST MOD 30 MIN: CPT | Performed by: NURSE PRACTITIONER

## 2024-05-02 RX ORDER — LORAZEPAM 2 MG/1
2 TABLET ORAL 2 TIMES DAILY PRN
Qty: 60 TABLET | Refills: 5 | Status: SHIPPED | OUTPATIENT
Start: 2024-05-02

## 2024-05-02 RX ORDER — BUSPIRONE HYDROCHLORIDE 10 MG/1
5 TABLET ORAL 3 TIMES DAILY
Qty: 270 TABLET | Refills: 1 | Status: SHIPPED | OUTPATIENT
Start: 2024-05-02

## 2024-05-02 NOTE — PATIENT INSTRUCTIONS
Increase Buspar to 10mg three times a day, this is an increase from 5mg three times a day  Continue Prozac 60mg daily  Continue other medications  Patient verbalizes understanding to go to ER if severe or worsening headache      Medicare Preventive Visit Patient Instructions  Thank you for completing your Welcome to Medicare Visit or Medicare Annual Wellness Visit today. Your next wellness visit will be due in one year (5/3/2025).  The screening/preventive services that you may require over the next 5-10 years are detailed below. Some tests may not apply to you based off risk factors and/or age. Screening tests ordered at today's visit but not completed yet may show as past due. Also, please note that scanned in results may not display below.  Preventive Screenings:  Service Recommendations Previous Testing/Comments   Colorectal Cancer Screening  * Colonoscopy    * Fecal Occult Blood Test (FOBT)/Fecal Immunochemical Test (FIT)  * Fecal DNA/Cologuard Test  * Flexible Sigmoidoscopy Age: 45-75 years old   Colonoscopy: every 10 years (may be performed more frequently if at higher risk)  OR  FOBT/FIT: every 1 year  OR  Cologuard: every 3 years  OR  Sigmoidoscopy: every 5 years  Screening may be recommended earlier than age 45 if at higher risk for colorectal cancer. Also, an individualized decision between you and your healthcare provider will decide whether screening between the ages of 76-85 would be appropriate. Colonoscopy: 10/11/2012  FOBT/FIT: Not on file  Cologuard: 10/20/2022  Sigmoidoscopy: Not on file          Breast Cancer Screening Age: 40+ years old  Frequency: every 1-2 years  Not required if history of left and right mastectomy Mammogram: 02/05/2024        Cervical Cancer Screening Between the ages of 21-29, pap smear recommended once every 3 years.   Between the ages of 30-65, can perform pap smear with HPV co-testing every 5 years.   Recommendations may differ for women with a history of total  hysterectomy, cervical cancer, or abnormal pap smears in past. Pap Smear: Not on file        Hepatitis C Screening Once for adults born between 1945 and 1965  More frequently in patients at high risk for Hepatitis C Hep C Antibody: 02/24/2020        Diabetes Screening 1-2 times per year if you're at risk for diabetes or have pre-diabetes Fasting glucose: 91 mg/dL (1/23/2024)  A1C: 5.2 % (2/24/2020)      Cholesterol Screening Once every 5 years if you don't have a lipid disorder. May order more often based on risk factors. Lipid panel: 01/23/2024          Other Preventive Screenings Covered by Medicare:  Abdominal Aortic Aneurysm (AAA) Screening: covered once if your at risk. You're considered to be at risk if you have a family history of AAA.  Lung Cancer Screening: covers low dose CT scan once per year if you meet all of the following conditions: (1) Age 55-77; (2) No signs or symptoms of lung cancer; (3) Current smoker or have quit smoking within the last 15 years; (4) You have a tobacco smoking history of at least 20 pack years (packs per day multiplied by number of years you smoked); (5) You get a written order from a healthcare provider.  Glaucoma Screening: covered annually if you're considered high risk: (1) You have diabetes OR (2) Family history of glaucoma OR (3)  aged 50 and older OR (4)  American aged 65 and older  Osteoporosis Screening: covered every 2 years if you meet one of the following conditions: (1) You're estrogen deficient and at risk for osteoporosis based off medical history and other findings; (2) Have a vertebral abnormality; (3) On glucocorticoid therapy for more than 3 months; (4) Have primary hyperparathyroidism; (5) On osteoporosis medications and need to assess response to drug therapy.   Last bone density test (DXA Scan): 02/05/2024.  HIV Screening: covered annually if you're between the age of 15-65. Also covered annually if you are younger than 15 and older  than 65 with risk factors for HIV infection. For pregnant patients, it is covered up to 3 times per pregnancy.    Immunizations:  Immunization Recommendations   Influenza Vaccine Annual influenza vaccination during flu season is recommended for all persons aged >= 6 months who do not have contraindications   Pneumococcal Vaccine   * Pneumococcal conjugate vaccine = PCV13 (Prevnar 13), PCV15 (Vaxneuvance), PCV20 (Prevnar 20)  * Pneumococcal polysaccharide vaccine = PPSV23 (Pneumovax) Adults 19-65 yo with certain risk factors or if 65+ yo  If never received any pneumonia vaccine: recommend Prevnar 20 (PCV20)  Give PCV20 if previously received 1 dose of PCV13 or PPSV23   Hepatitis B Vaccine 3 dose series if at intermediate or high risk (ex: diabetes, end stage renal disease, liver disease)   Respiratory syncytial virus (RSV) Vaccine - COVERED BY MEDICARE PART D  * RSVPreF3 (Arexvy) CDC recommends that adults 60 years of age and older may receive a single dose of RSV vaccine using shared clinical decision-making (SCDM)   Tetanus (Td) Vaccine - COST NOT COVERED BY MEDICARE PART B Following completion of primary series, a booster dose should be given every 10 years to maintain immunity against tetanus. Td may also be given as tetanus wound prophylaxis.   Tdap Vaccine - COST NOT COVERED BY MEDICARE PART B Recommended at least once for all adults. For pregnant patients, recommended with each pregnancy.   Shingles Vaccine (Shingrix) - COST NOT COVERED BY MEDICARE PART B  2 shot series recommended in those 19 years and older who have or will have weakened immune systems or those 50 years and older     Health Maintenance Due:      Topic Date Due    Breast Cancer Screening: Mammogram  02/05/2025    Colorectal Cancer Screening  10/20/2025    Hepatitis C Screening  Completed     Immunizations Due:      Topic Date Due    COVID-19 Vaccine (3 - 2023-24 season) 09/01/2023     Advance Directives   What are advance directives?  Advance  directives are legal documents that state your wishes and plans for medical care. These plans are made ahead of time in case you lose your ability to make decisions for yourself. Advance directives can apply to any medical decision, such as the treatments you want, and if you want to donate organs.   What are the types of advance directives?  There are many types of advance directives, and each state has rules about how to use them. You may choose a combination of any of the following:  Living will:  This is a written record of the treatment you want. You can also choose which treatments you do not want, which to limit, and which to stop at a certain time. This includes surgery, medicine, IV fluid, and tube feedings.   Durable power of  for healthcare (DPAHC):  This is a written record that states who you want to make healthcare choices for you when you are unable to make them for yourself. This person, called a proxy, is usually a family member or a friend. You may choose more than 1 proxy.  Do not resuscitate (DNR) order:  A DNR order is used in case your heart stops beating or you stop breathing. It is a request not to have certain forms of treatment, such as CPR. A DNR order may be included in other types of advance directives.  Medical directive:  This covers the care that you want if you are in a coma, near death, or unable to make decisions for yourself. You can list the treatments you want for each condition. Treatment may include pain medicine, surgery, blood transfusions, dialysis, IV or tube feedings, and a ventilator (breathing machine).  Values history:  This document has questions about your views, beliefs, and how you feel and think about life. This information can help others choose the care that you would choose.  Why are advance directives important?  An advance directive helps you control your care. Although spoken wishes may be used, it is better to have your wishes written down. Spoken  wishes can be misunderstood, or not followed. Treatments may be given even if you do not want them. An advance directive may make it easier for your family to make difficult choices about your care.       © Copyright [employer] 2018 Information is for End User's use only and may not be sold, redistributed or otherwise used for commercial purposes. All illustrations and images included in CareNotes® are the copyrighted property of [employer] or [employer] ([employer])

## 2024-05-02 NOTE — PROGRESS NOTES
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)       Endocrine    Acquired hypothyroidism    Relevant Orders    TSH, 3rd generation with Free T4 reflex       Behavioral Health    Depression, recurrent (HCC)    Relevant Medications    busPIRone (BUSPAR) 10 mg tablet    LORazepam (ATIVAN) 2 mg tablet    Anxiety    Relevant Medications    busPIRone (BUSPAR) 10 mg tablet    Depression with anxiety    Relevant Medications    busPIRone (BUSPAR) 10 mg tablet    LORazepam (ATIVAN) 2 mg tablet       Other    Vitamin D deficiency    Relevant Orders    Vitamin D 25 hydroxy     Other Visit Diagnoses       Medicare annual wellness visit, subsequent    -  Primary    Relevant Orders    Comprehensive metabolic panel    CBC and differential    Screening cholesterol level        Relevant Orders    Lipid panel    Pseudoaneurysm (HCC)        Other fatigue        Relevant Orders    Iron Panel (Includes Ferritin, Iron Sat%, Iron, and TIBC)    Deficiency anemia        Relevant Orders    Iron Panel (Includes Ferritin, Iron Sat%, Iron, and TIBC)              Depression Screening and Follow-up Plan: Patient's depression screening was positive with a PHQ-9 score of 13. Patient assessed for underlying major depression. Brief counseling provided and recommend additional follow-up/re-evaluation next office visit.       Preventive health issues were discussed with patient, and age appropriate screening tests were ordered as noted in patient's After Visit Summary.  Personalized health advice and appropriate referrals for health education or preventive services given if needed, as noted in patient's After Visit Summary.     History of Present Illness:     Patient presents for a Medicare Wellness Visit    Here for medicare wellness visit-  Reports pain in back of head and into forehead-  "has increased stress and also suffers from seasonal allergies-This pain she reports is slowly improving. She is  taking Ibuprofen with relief when she takes this. Originally also had dizziness and ear aches which have all resolved. Muscle relaxers are working also when she takes them    Reports she doesn't think her Prozac is working- worrying more, joined the gym but \"never went\", lacks ambition to do things like work in her flower gardens       Patient Care Team:  MARY Gastelum as PCP - General (Family Medicine)  Petar Bess DO (Gastroenterology)     Review of Systems:     Review of Systems   Constitutional:  Negative for activity change, diaphoresis, fatigue and fever.   HENT:  Negative for congestion, facial swelling, hearing loss, rhinorrhea, sinus pressure, sinus pain, sneezing, sore throat and voice change.    Eyes:  Negative for discharge and visual disturbance.   Respiratory:  Negative for cough, choking, chest tightness, shortness of breath, wheezing and stridor.    Cardiovascular:  Negative for chest pain, palpitations and leg swelling.   Gastrointestinal:  Negative for abdominal distention, abdominal pain, constipation, diarrhea, nausea and vomiting.   Endocrine: Negative for polydipsia, polyphagia and polyuria.   Genitourinary:  Negative for difficulty urinating, dysuria, frequency and urgency.   Musculoskeletal:  Positive for arthralgias, neck pain and neck stiffness.   Skin:  Negative for color change, rash and wound.   Neurological:  Positive for dizziness (resolved) and headaches. Negative for syncope, speech difficulty, weakness and light-headedness.   Hematological:  Negative for adenopathy. Does not bruise/bleed easily.   Psychiatric/Behavioral:  Positive for dysphoric mood. Negative for agitation, behavioral problems, confusion, hallucinations and suicidal ideas. The patient is nervous/anxious.         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 pappas, Dr. Carrera)    Disease of thyroid gland      Past Surgical History:   Procedure Laterality Date    DENTAL SURGERY      INCISION AND DRAINAGE OF WOUND Right 08/28/2021    Procedure: INCISION AND DRAINAGE (I&D) HEAD/FACE;  Surgeon: Jeferson Dunn DMD;  Location: [facility];  Service: Maxillofacial    MOUTH SURGERY      MULTIPLE TOOTH EXTRACTIONS Right 08/28/2021    Procedure: EXTRACTION TEETH 25,26,27;  Surgeon: Jeferson Dunn DMD;  Location: [facility];  Service: Maxillofacial    MYRINGOPLASTY      REFRACTIVE SURGERY Left 10/27/2023    TOE SURGERY      TONSILLECTOMY      Dr. Lester      Family History:     Family History   Problem Relation Age of Onset    Cancer Mother     No Known Problems Father     Breast cancer Sister 30    No Known Problems Sister     No Known Problems Sister     No Known Problems Sister     No Known Problems Daughter     Diabetes Maternal Grandmother     No Known Problems Maternal Grandfather     No Known Problems Paternal Grandmother     No Known Problems Paternal Grandfather     No Known Problems Maternal Aunt     No Known Problems Maternal Aunt     Breast cancer additional onset Neg Hx     BRCA2 Negative Neg Hx     BRCA2 Positive Neg Hx     BRCA1 Positive Neg Hx     BRCA1 Negative Neg Hx     BRCA 1/2 Neg Hx     Ovarian cancer Neg Hx     Endometrial cancer Neg Hx     Colon  cancer Neg Hx       Social History:     Social History     Socioeconomic History    Marital status:      Spouse name: None    Number of children: None    Years of education: None    Highest education level: None   Occupational History    None   Tobacco Use    Smoking status: Former     Types: Cigarettes    Smokeless tobacco: Never   Vaping Use    Vaping status: Never Used   Substance and Sexual Activity    Alcohol use: Yes    Drug use: Never    Sexual activity: None   Other Topics Concern    None   Social History Narrative    None     Social Determinants of Health     Financial Resource Strain: Low Risk  (5/1/2023)    Overall Financial Resource Strain (CARDIA)     Difficulty of Paying Living Expenses: Not very hard   Food Insecurity: No Food Insecurity (5/2/2024)    Hunger Vital Sign     Worried About Running Out of Food in the Last Year: Never true     Ran Out of Food in the Last Year: Never true   Transportation Needs: No Transportation Needs (5/2/2024)    PRAPARE - Transportation     Lack of Transportation (Medical): No     Lack of Transportation (Non-Medical): No   Physical Activity: Not on file   Stress: Not on file   Social Connections: Not on file   Intimate Partner Violence: Not on file   Housing Stability: Low Risk  (5/2/2024)    Housing Stability Vital Sign     Unable to Pay for Housing in the Last Year: No     Number of Places Lived in the Last Year: 1     Unstable Housing in the Last Year: No      Medications and Allergies:     Current Outpatient Medications   Medication Sig Dispense Refill    acetaminophen (TYLENOL) 325 mg tablet Take 3 tablets (975 mg total) by mouth every 8 (eight) hours  0    ascorbic acid (VITAMIN C) 1000 MG tablet Take 1,000 mg by mouth daily      aspirin (Aspirin 81) 81 mg EC tablet Take 1 tablet (81 mg total) by mouth daily  0    busPIRone (BUSPAR) 10 mg tablet Take 0.5 tablets (5 mg total) by mouth 3 (three) times a day 270 tablet 1    FLUoxetine (PROzac) 60 MG TABS Take  1 tablet (60 mg total) by mouth daily 90 tablet 3    fluticasone (FLONASE) 50 mcg/act nasal spray SPRAY 2 SPRAYS INTO EACH NOSTRIL EVERY DAY 48 mL 4    levothyroxine 100 mcg tablet TAKE 1 TABLET BY MOUTH EVERY DAY 90 tablet 1    lisinopril (ZESTRIL) 20 mg tablet TAKE 1 TABLET BY MOUTH EVERY DAY 90 tablet 3    LORazepam (ATIVAN) 2 mg tablet Take 1 tablet (2 mg total) by mouth 2 (two) times a day as needed for anxiety 60 tablet 5    Melatonin 10 MG TABS Take by mouth once at bedtime      Multiple Vitamin (MULTIVITAMIN) tablet Take 1 tablet by mouth daily      ofloxacin (FLOXIN) 0.3 % otic solution Administer 5 drops into the left ear 2 (two) times a day As needed for otorrhea 10 mL 3    tiZANidine (ZANAFLEX) 2 mg tablet TAKE 1 TABLET BY MOUTH EVERY 8 HOURS AS NEEDED FOR MUSCLE SPASM 270 tablet 1    TURMERIC PO Take 1,000 mg by mouth daily      vitamin B-12 (VITAMIN B-12) 500 mcg tablet Take 5,000 mcg by mouth daily      Vitamin D, Cholecalciferol, 50 MCG (2000 UT) CAPS Take by mouth daily      ibuprofen (MOTRIN) 400 mg tablet Take 1 tablet (400 mg total) by mouth every 8 (eight) hours as needed for mild pain for up to 7 days 21 tablet 0     No current facility-administered medications for this visit.     No Known Allergies   Immunizations:     Immunization History   Administered Date(s) Administered    COVID-19 PFIZER VACCINE 0.3 ML IM 03/22/2021, 04/12/2021    Influenza Split High Dose Preservative Free IM 11/05/2019    Influenza, high dose seasonal 0.7 mL 09/06/2020, 11/15/2021, 10/04/2022, 11/22/2023    Pneumococcal Conjugate 13-Valent 02/19/2021    Pneumococcal Conjugate Vaccine 20-valent (Pcv20), Polysace 05/01/2023    Zoster Vaccine Recombinant 09/10/2019, 12/21/2019      Health Maintenance:         Topic Date Due    Breast Cancer Screening: Mammogram  02/05/2025    Colorectal Cancer Screening  10/20/2025    Hepatitis C Screening  Completed         Topic Date Due    COVID-19 Vaccine (3 - 2023-24 season)  09/01/2023      Medicare Screening Tests and Risk Assessments:     Radha is here for her Subsequent Wellness visit.     Health Risk Assessment:   Patient rates overall health as good. Patient feels that their physical health rating is same. Patient is satisfied with their life. Eyesight was rated as slightly worse. Hearing was rated as slightly worse. Patient feels that their emotional and mental health rating is slightly worse. Patients states they are sometimes angry. Patient states they are always unusually tired/fatigued. Pain experienced in the last 7 days has been a lot. Patient's pain rating has been 9/10. Patient states that she has experienced weight loss or gain in last 6 months.     Depression Screening:   PHQ-9 Score: 13      Fall Risk Screening:   In the past year, patient has experienced: no history of falling in past year      Urinary Incontinence Screening:   Patient has not leaked urine accidently in the last six months.     Home Safety:  Patient does not have trouble with stairs inside or outside of their home. Patient has working smoke alarms and has working carbon monoxide detector. Home safety hazards include: none.     Nutrition:   Current diet is Regular.     Medications:   Patient is currently taking over-the-counter supplements. OTC medications include: see medication list. Patient is able to manage medications.     Activities of Daily Living (ADLs)/Instrumental Activities of Daily Living (IADLs):   Walk and transfer into and out of bed and chair?: Yes  Dress and groom yourself?: Yes    Bathe or shower yourself?: Yes    Feed yourself? Yes  Do your laundry/housekeeping?: Yes  Manage your money, pay your bills and track your expenses?: Yes  Make your own meals?: Yes    Do your own shopping?: Yes    Previous Hospitalizations:   Any hospitalizations or ED visits within the last 12 months?: No      Advance Care Planning:   Living will: Yes    Durable POA for healthcare: Yes    Advanced directive:  "Yes      PREVENTIVE SCREENINGS      Cardiovascular Screening:    General: Screening Current    Due for: Lipid Panel      Diabetes Screening:     General: Screening Current    Due for: Blood Glucose      Colorectal Cancer Screening:     General: Screening Current      Breast Cancer Screening:     General: Screening Current      Cervical Cancer Screening:    General: Screening Not Indicated      Osteoporosis Screening:    General: Screening Current      Lung Cancer Screening:     General: Screening Not Indicated      Hepatitis C Screening:    General: Screening Current    No results found.     Physical Exam:     /78   Pulse 79   Temp 98 °F (36.7 °C) (Tympanic)   Ht 5' 8\" (1.727 m)   Wt 68.5 kg (151 lb)   SpO2 98%   BMI 22.96 kg/m²     Physical Exam  Vitals and nursing note reviewed.   Constitutional:       General: She is not in acute distress.     Appearance: She is well-developed. She is not diaphoretic.   Neck:      Thyroid: No thyromegaly.     Cardiovascular:      Rate and Rhythm: Normal rate and regular rhythm.      Heart sounds: Normal heart sounds. No murmur heard.  Pulmonary:      Effort: Pulmonary effort is normal. No respiratory distress.      Breath sounds: Normal breath sounds. No wheezing.   Musculoskeletal:         General: Normal range of motion.      Cervical back: Normal range of motion and neck supple. Pain with movement and muscular tenderness present. No spinous process tenderness.      Right lower leg: No edema.      Left lower leg: No edema.   Skin:     General: Skin is warm and dry.      Findings: No erythema or rash.   Neurological:      Mental Status: She is alert and oriented to person, place, and time.   Psychiatric:         Attention and Perception: Attention normal.         Mood and Affect: Mood is anxious. Mood is not depressed. Affect is not flat.         Speech: Speech normal.         Behavior: Behavior normal.         Thought Content: Thought content normal.         " Cognition and Memory: Cognition and memory normal.         Judgment: Judgment normal.          MARY Gastelum

## 2024-06-11 ENCOUNTER — HOSPITAL ENCOUNTER (INPATIENT)
Facility: HOSPITAL | Age: 74
LOS: 2 days | Discharge: HOME | DRG: 394 | End: 2024-06-13
Attending: EMERGENCY MEDICINE | Admitting: INTERNAL MEDICINE
Payer: COMMERCIAL

## 2024-06-11 ENCOUNTER — APPOINTMENT (EMERGENCY)
Dept: CT IMAGING | Facility: HOSPITAL | Age: 74
DRG: 394 | End: 2024-06-11
Payer: COMMERCIAL

## 2024-06-11 DIAGNOSIS — K52.9 COLITIS: Primary | ICD-10-CM

## 2024-06-11 DIAGNOSIS — R19.7 BLOODY DIARRHEA: ICD-10-CM

## 2024-06-11 LAB
ALBUMIN SERPL BCP-MCNC: 4.4 G/DL (ref 3.5–5)
ALP SERPL-CCNC: 78 U/L (ref 34–104)
ALT SERPL W P-5'-P-CCNC: 15 U/L (ref 7–52)
ANION GAP SERPL CALCULATED.3IONS-SCNC: 11 MMOL/L (ref 4–13)
AST SERPL W P-5'-P-CCNC: 19 U/L (ref 13–39)
ATRIAL RATE: 82 BPM
BASOPHILS # BLD AUTO: 0.01 THOUSANDS/ÂΜL (ref 0–0.1)
BASOPHILS NFR BLD AUTO: 0 % (ref 0–1)
BILIRUB SERPL-MCNC: 0.7 MG/DL (ref 0.2–1)
BILIRUB UR QL STRIP: NEGATIVE
BUN SERPL-MCNC: 19 MG/DL (ref 5–25)
CALCIUM SERPL-MCNC: 9.5 MG/DL (ref 8.4–10.2)
CHLORIDE SERPL-SCNC: 99 MMOL/L (ref 96–108)
CLARITY UR: CLEAR
CO2 SERPL-SCNC: 23 MMOL/L (ref 21–32)
COLOR UR: YELLOW
CREAT SERPL-MCNC: 0.86 MG/DL (ref 0.6–1.3)
CRP SERPL QL: 9 MG/L
EOSINOPHIL # BLD AUTO: 0 THOUSAND/ÂΜL (ref 0–0.61)
EOSINOPHIL NFR BLD AUTO: 0 % (ref 0–6)
ERYTHROCYTE [DISTWIDTH] IN BLOOD BY AUTOMATED COUNT: 11.1 % (ref 11.6–15.1)
ERYTHROCYTE [DISTWIDTH] IN BLOOD BY AUTOMATED COUNT: 11.4 % (ref 11.6–15.1)
GFR SERPL CREATININE-BSD FRML MDRD: 67 ML/MIN/1.73SQ M
GLUCOSE SERPL-MCNC: 136 MG/DL (ref 65–140)
GLUCOSE UR STRIP-MCNC: NEGATIVE MG/DL
HCT VFR BLD AUTO: 38.2 % (ref 34.8–46.1)
HCT VFR BLD AUTO: 41.1 % (ref 34.8–46.1)
HGB BLD-MCNC: 12.9 G/DL (ref 11.5–15.4)
HGB BLD-MCNC: 13.7 G/DL (ref 11.5–15.4)
HGB UR QL STRIP.AUTO: NEGATIVE
IMM GRANULOCYTES # BLD AUTO: 0.03 THOUSAND/UL (ref 0–0.2)
IMM GRANULOCYTES NFR BLD AUTO: 0 % (ref 0–2)
KETONES UR STRIP-MCNC: NEGATIVE MG/DL
LACTATE SERPL-SCNC: 0.8 MMOL/L (ref 0.5–2)
LEUKOCYTE ESTERASE UR QL STRIP: NEGATIVE
LIPASE SERPL-CCNC: 12 U/L (ref 11–82)
LYMPHOCYTES # BLD AUTO: 0.71 THOUSANDS/ÂΜL (ref 0.6–4.47)
LYMPHOCYTES NFR BLD AUTO: 7 % (ref 14–44)
MAGNESIUM SERPL-MCNC: 1.7 MG/DL (ref 1.9–2.7)
MCH RBC QN AUTO: 31.3 PG (ref 26.8–34.3)
MCH RBC QN AUTO: 32.4 PG (ref 26.8–34.3)
MCHC RBC AUTO-ENTMCNC: 33.3 G/DL (ref 31.4–37.4)
MCHC RBC AUTO-ENTMCNC: 33.8 G/DL (ref 31.4–37.4)
MCV RBC AUTO: 94 FL (ref 82–98)
MCV RBC AUTO: 96 FL (ref 82–98)
MONOCYTES # BLD AUTO: 0.37 THOUSAND/ÂΜL (ref 0.17–1.22)
MONOCYTES NFR BLD AUTO: 4 % (ref 4–12)
NEUTROPHILS # BLD AUTO: 8.99 THOUSANDS/ÂΜL (ref 1.85–7.62)
NEUTS SEG NFR BLD AUTO: 89 % (ref 43–75)
NITRITE UR QL STRIP: NEGATIVE
NRBC BLD AUTO-RTO: 0 /100 WBCS
P AXIS: 40 DEGREES
PH UR STRIP.AUTO: 6 [PH]
PLATELET # BLD AUTO: 217 THOUSANDS/UL (ref 149–390)
PLATELET # BLD AUTO: 234 THOUSANDS/UL (ref 149–390)
PMV BLD AUTO: 9.5 FL (ref 8.9–12.7)
PMV BLD AUTO: 9.7 FL (ref 8.9–12.7)
POTASSIUM SERPL-SCNC: 3.7 MMOL/L (ref 3.5–5.3)
PR INTERVAL: 142 MS
PROT SERPL-MCNC: 7.3 G/DL (ref 6.4–8.4)
PROT UR STRIP-MCNC: NEGATIVE MG/DL
QRS AXIS: -4 DEGREES
QRSD INTERVAL: 84 MS
QT INTERVAL: 388 MS
QTC INTERVAL: 453 MS
RBC # BLD AUTO: 3.98 MILLION/UL (ref 3.81–5.12)
RBC # BLD AUTO: 4.38 MILLION/UL (ref 3.81–5.12)
SODIUM SERPL-SCNC: 133 MMOL/L (ref 135–147)
SP GR UR STRIP.AUTO: 1.01
T WAVE AXIS: 9 DEGREES
UROBILINOGEN UR QL STRIP.AUTO: 0.2 E.U./DL
VENTRICULAR RATE: 82 BPM
WBC # BLD AUTO: 10.11 THOUSAND/UL (ref 4.31–10.16)
WBC # BLD AUTO: 11.65 THOUSAND/UL (ref 4.31–10.16)

## 2024-06-11 PROCEDURE — 83735 ASSAY OF MAGNESIUM: CPT | Performed by: EMERGENCY MEDICINE

## 2024-06-11 PROCEDURE — 86140 C-REACTIVE PROTEIN: CPT | Performed by: INTERNAL MEDICINE

## 2024-06-11 PROCEDURE — 87177 OVA AND PARASITES SMEARS: CPT | Performed by: INTERNAL MEDICINE

## 2024-06-11 PROCEDURE — 74177 CT ABD & PELVIS W/CONTRAST: CPT

## 2024-06-11 PROCEDURE — 99285 EMERGENCY DEPT VISIT HI MDM: CPT | Performed by: EMERGENCY MEDICINE

## 2024-06-11 PROCEDURE — 93005 ELECTROCARDIOGRAM TRACING: CPT

## 2024-06-11 PROCEDURE — 85027 COMPLETE CBC AUTOMATED: CPT | Performed by: INTERNAL MEDICINE

## 2024-06-11 PROCEDURE — 85025 COMPLETE CBC W/AUTO DIFF WBC: CPT | Performed by: EMERGENCY MEDICINE

## 2024-06-11 PROCEDURE — 36415 COLL VENOUS BLD VENIPUNCTURE: CPT | Performed by: EMERGENCY MEDICINE

## 2024-06-11 PROCEDURE — 81003 URINALYSIS AUTO W/O SCOPE: CPT | Performed by: EMERGENCY MEDICINE

## 2024-06-11 PROCEDURE — 99222 1ST HOSP IP/OBS MODERATE 55: CPT | Performed by: INTERNAL MEDICINE

## 2024-06-11 PROCEDURE — 83605 ASSAY OF LACTIC ACID: CPT | Performed by: INTERNAL MEDICINE

## 2024-06-11 PROCEDURE — 96361 HYDRATE IV INFUSION ADD-ON: CPT

## 2024-06-11 PROCEDURE — 83690 ASSAY OF LIPASE: CPT | Performed by: EMERGENCY MEDICINE

## 2024-06-11 PROCEDURE — 93010 ELECTROCARDIOGRAM REPORT: CPT | Performed by: INTERNAL MEDICINE

## 2024-06-11 PROCEDURE — 96365 THER/PROPH/DIAG IV INF INIT: CPT

## 2024-06-11 PROCEDURE — C9113 INJ PANTOPRAZOLE SODIUM, VIA: HCPCS | Performed by: INTERNAL MEDICINE

## 2024-06-11 PROCEDURE — 87505 NFCT AGENT DETECTION GI: CPT | Performed by: INTERNAL MEDICINE

## 2024-06-11 PROCEDURE — 87493 C DIFF AMPLIFIED PROBE: CPT | Performed by: INTERNAL MEDICINE

## 2024-06-11 PROCEDURE — 80053 COMPREHEN METABOLIC PANEL: CPT | Performed by: EMERGENCY MEDICINE

## 2024-06-11 PROCEDURE — 99285 EMERGENCY DEPT VISIT HI MDM: CPT

## 2024-06-11 PROCEDURE — 96375 TX/PRO/DX INJ NEW DRUG ADDON: CPT

## 2024-06-11 PROCEDURE — 87209 SMEAR COMPLEX STAIN: CPT | Performed by: INTERNAL MEDICINE

## 2024-06-11 RX ORDER — SODIUM CHLORIDE, SODIUM LACTATE, POTASSIUM CHLORIDE, CALCIUM CHLORIDE 600; 310; 30; 20 MG/100ML; MG/100ML; MG/100ML; MG/100ML
100 INJECTION, SOLUTION INTRAVENOUS CONTINUOUS
Status: DISPENSED | OUTPATIENT
Start: 2024-06-11 | End: 2024-06-12

## 2024-06-11 RX ORDER — LORAZEPAM 1 MG/1
2 TABLET ORAL 2 TIMES DAILY PRN
Status: DISCONTINUED | OUTPATIENT
Start: 2024-06-11 | End: 2024-06-13 | Stop reason: HOSPADM

## 2024-06-11 RX ORDER — FLUOXETINE HYDROCHLORIDE 20 MG/1
60 CAPSULE ORAL DAILY
Status: DISCONTINUED | OUTPATIENT
Start: 2024-06-11 | End: 2024-06-13 | Stop reason: HOSPADM

## 2024-06-11 RX ORDER — ACETAMINOPHEN 325 MG/1
975 TABLET ORAL EVERY 8 HOURS PRN
Status: DISCONTINUED | OUTPATIENT
Start: 2024-06-11 | End: 2024-06-13 | Stop reason: HOSPADM

## 2024-06-11 RX ORDER — BUSPIRONE HYDROCHLORIDE 5 MG/1
5 TABLET ORAL 2 TIMES DAILY
Status: DISCONTINUED | OUTPATIENT
Start: 2024-06-11 | End: 2024-06-13 | Stop reason: HOSPADM

## 2024-06-11 RX ORDER — LEVOTHYROXINE SODIUM 0.1 MG/1
100 TABLET ORAL
Status: DISCONTINUED | OUTPATIENT
Start: 2024-06-11 | End: 2024-06-13 | Stop reason: HOSPADM

## 2024-06-11 RX ORDER — ONDANSETRON 2 MG/ML
4 INJECTION INTRAMUSCULAR; INTRAVENOUS EVERY 8 HOURS PRN
Status: DISCONTINUED | OUTPATIENT
Start: 2024-06-11 | End: 2024-06-13 | Stop reason: HOSPADM

## 2024-06-11 RX ORDER — PANTOPRAZOLE SODIUM 40 MG/10ML
40 INJECTION, POWDER, LYOPHILIZED, FOR SOLUTION INTRAVENOUS EVERY 12 HOURS SCHEDULED
Status: DISCONTINUED | OUTPATIENT
Start: 2024-06-11 | End: 2024-06-13

## 2024-06-11 RX ORDER — MORPHINE SULFATE 4 MG/ML
4 INJECTION, SOLUTION INTRAMUSCULAR; INTRAVENOUS ONCE
Status: COMPLETED | OUTPATIENT
Start: 2024-06-11 | End: 2024-06-11

## 2024-06-11 RX ORDER — SODIUM CHLORIDE, SODIUM GLUCONATE, SODIUM ACETATE, POTASSIUM CHLORIDE, MAGNESIUM CHLORIDE, SODIUM PHOSPHATE, DIBASIC, AND POTASSIUM PHOSPHATE .53; .5; .37; .037; .03; .012; .00082 G/100ML; G/100ML; G/100ML; G/100ML; G/100ML; G/100ML; G/100ML
100 INJECTION, SOLUTION INTRAVENOUS CONTINUOUS
Status: DISCONTINUED | OUTPATIENT
Start: 2024-06-11 | End: 2024-06-11

## 2024-06-11 RX ORDER — FLUTICASONE PROPIONATE 50 MCG
1 SPRAY, SUSPENSION (ML) NASAL DAILY
Status: DISCONTINUED | OUTPATIENT
Start: 2024-06-11 | End: 2024-06-13 | Stop reason: HOSPADM

## 2024-06-11 RX ORDER — OXYCODONE HYDROCHLORIDE 5 MG/1
5 TABLET ORAL EVERY 6 HOURS PRN
Status: DISCONTINUED | OUTPATIENT
Start: 2024-06-11 | End: 2024-06-13 | Stop reason: HOSPADM

## 2024-06-11 RX ORDER — MAGNESIUM SULFATE HEPTAHYDRATE 40 MG/ML
2 INJECTION, SOLUTION INTRAVENOUS ONCE
Status: COMPLETED | OUTPATIENT
Start: 2024-06-11 | End: 2024-06-11

## 2024-06-11 RX ORDER — ONDANSETRON 2 MG/ML
4 INJECTION INTRAMUSCULAR; INTRAVENOUS ONCE
Status: COMPLETED | OUTPATIENT
Start: 2024-06-11 | End: 2024-06-11

## 2024-06-11 RX ORDER — HYDROMORPHONE HCL IN WATER/PF 6 MG/30 ML
0.2 PATIENT CONTROLLED ANALGESIA SYRINGE INTRAVENOUS
Status: DISCONTINUED | OUTPATIENT
Start: 2024-06-11 | End: 2024-06-13 | Stop reason: HOSPADM

## 2024-06-11 RX ADMIN — BUSPIRONE HYDROCHLORIDE 5 MG: 5 TABLET ORAL at 11:42

## 2024-06-11 RX ADMIN — PANTOPRAZOLE SODIUM 40 MG: 40 INJECTION, POWDER, FOR SOLUTION INTRAVENOUS at 11:22

## 2024-06-11 RX ADMIN — PANTOPRAZOLE SODIUM 40 MG: 40 INJECTION, POWDER, FOR SOLUTION INTRAVENOUS at 20:56

## 2024-06-11 RX ADMIN — MAGNESIUM SULFATE HEPTAHYDRATE 2 G: 40 INJECTION, SOLUTION INTRAVENOUS at 07:02

## 2024-06-11 RX ADMIN — PIPERACILLIN AND TAZOBACTAM 4.5 G: 4; .5 INJECTION, POWDER, LYOPHILIZED, FOR SOLUTION INTRAVENOUS at 20:56

## 2024-06-11 RX ADMIN — IOHEXOL 100 ML: 350 INJECTION, SOLUTION INTRAVENOUS at 06:55

## 2024-06-11 RX ADMIN — ACETAMINOPHEN 975 MG: 325 TABLET ORAL at 09:25

## 2024-06-11 RX ADMIN — ASPIRIN 81 MG: 81 TABLET, COATED ORAL at 11:42

## 2024-06-11 RX ADMIN — SODIUM CHLORIDE, SODIUM GLUCONATE, SODIUM ACETATE, POTASSIUM CHLORIDE, MAGNESIUM CHLORIDE, SODIUM PHOSPHATE, DIBASIC, AND POTASSIUM PHOSPHATE 100 ML/HR: .53; .5; .37; .037; .03; .012; .00082 INJECTION, SOLUTION INTRAVENOUS at 12:43

## 2024-06-11 RX ADMIN — FLUOXETINE HYDROCHLORIDE 60 MG: 20 CAPSULE ORAL at 11:42

## 2024-06-11 RX ADMIN — SODIUM CHLORIDE 1000 ML: 0.9 INJECTION, SOLUTION INTRAVENOUS at 06:24

## 2024-06-11 RX ADMIN — SODIUM CHLORIDE, SODIUM LACTATE, POTASSIUM CHLORIDE, AND CALCIUM CHLORIDE 100 ML/HR: .6; .31; .03; .02 INJECTION, SOLUTION INTRAVENOUS at 16:57

## 2024-06-11 RX ADMIN — MORPHINE SULFATE 4 MG: 4 INJECTION, SOLUTION INTRAMUSCULAR; INTRAVENOUS at 06:25

## 2024-06-11 RX ADMIN — Medication 2.5 MG: at 14:33

## 2024-06-11 RX ADMIN — MAGNESIUM SULFATE HEPTAHYDRATE 2 G: 40 INJECTION, SOLUTION INTRAVENOUS at 11:22

## 2024-06-11 RX ADMIN — BUSPIRONE HYDROCHLORIDE 5 MG: 5 TABLET ORAL at 17:37

## 2024-06-11 RX ADMIN — ONDANSETRON 4 MG: 2 INJECTION INTRAMUSCULAR; INTRAVENOUS at 06:25

## 2024-06-11 RX ADMIN — FLUTICASONE PROPIONATE 1 SPRAY: 50 SPRAY, METERED NASAL at 11:42

## 2024-06-11 RX ADMIN — HYDROMORPHONE HYDROCHLORIDE 0.2 MG: 0.2 INJECTION, SOLUTION INTRAMUSCULAR; INTRAVENOUS; SUBCUTANEOUS at 16:57

## 2024-06-11 RX ADMIN — Medication 2.5 MG: at 20:50

## 2024-06-11 RX ADMIN — PIPERACILLIN AND TAZOBACTAM 4.5 G: 4; .5 INJECTION, POWDER, LYOPHILIZED, FOR SOLUTION INTRAVENOUS at 16:57

## 2024-06-11 NOTE — H&P
[facility]  H&P  Name: Radha Saini 73 y.o. female I MRN: [id number]  Unit/Bed#: -01 I Date of Admission: 6/11/2024   Date of Service: 6/11/2024 I Hospital Day: 0      Assessment & Plan   Acquired hypothyroidism  Assessment & Plan  Continue levothyroxine    * Colitis  Assessment & Plan  Patient reports acute onset of nausea vomiting and diarrhea 3 hours after eating yesterday  Also reports for the past week she has been taking ibuprofen several times a day after a dental procedure  She is having epigastric pain and pain in her left upper and right upper quadrants  She reports an episode of bloody diarrhea.  Will obtain stool studies, started on antibiotics with ongoing bloody diarrhea  Suspicion for infectious colitis, also possible peptic ulcer, if not improving over the next 24 hours will discuss with GI  Also consider ischemic colitis with involvement of the splenic flexure, conservative management patient does not have signs of peritonitis, fluid hydration bowel rest           VTE Pharmacologic Prophylaxis: VTE Score: 0 Low Risk (Score 0-2) - Encourage Ambulation.  Code Status: Level 3 - DNAR and DNI level 3  Discussion with family: Updated  (significant other) at bedside.    Anticipated Length of Stay: Patient will be admitted on an inpatient basis with an anticipated length of stay of greater than 2 midnights secondary to colitis.    Total Time Spent on Date of Encounter in care of patient:  mins. This time was spent on one or more of the following: performing physical exam; counseling and coordination of care; obtaining or reviewing history; documenting in the medical record; reviewing/ordering tests, medications or procedures; communicating with other healthcare professionals and discussing with patient's family/caregivers.    Chief Complaint: diarrhea, n/v, abd pain    History of Present Illness:  Radha Saini is a 73 y.o. female with a PMH of anxiety, thyroid cancer,  hypothyroidism who presents with diarrhea abdominal pain nausea vomiting.  Patient reports eating yesterday and experiencing acute onset of abdominal pain with diarrhea and nausea with nonbloody nonbilious emesis.  Ports having several episodes of diarrhea eventually progressing to an episode of bloody diarrhea prompting her to present to the ER for further evaluation.  CT abdomen pelvis concerning for infectious segmental colitis from the splenic flexure through the descending colon.  She also reports taking ibuprofen over the past week following dental procedure and has epigastric pain.     Review of Systems:  Review of Systems   Constitutional:  Positive for fatigue. Negative for chills and fever.   HENT:  Negative for ear pain and sore throat.    Eyes:  Negative for pain and visual disturbance.   Respiratory:  Negative for cough, shortness of breath and wheezing.    Cardiovascular:  Negative for chest pain, palpitations and leg swelling.   Gastrointestinal:  Positive for abdominal pain, diarrhea, nausea and vomiting.   Genitourinary:  Negative for dysuria and hematuria.   Musculoskeletal:  Negative for arthralgias and back pain.   Skin:  Negative for color change and rash.   Neurological:  Negative for seizures and syncope.   All other systems reviewed and are negative.      Past Medical and Surgical History:   Past Medical History:   Diagnosis Date    Anxiety     Cancer (HCC) 2009    Tonsils- followed by Oncology( Dr. pappas, Dr. Carrera)    Disease of thyroid gland        Past Surgical History:   Procedure Laterality Date    DENTAL SURGERY      INCISION AND DRAINAGE OF WOUND Right 08/28/2021    Procedure: INCISION AND DRAINAGE (I&D) HEAD/FACE;  Surgeon: Jeferson Dunn DMD;  Location: [facility];  Service: Maxillofacial    MOUTH SURGERY      MULTIPLE TOOTH EXTRACTIONS Right 08/28/2021    Procedure: EXTRACTION TEETH 25,26,27;  Surgeon: Jeferson Dunn DMD;  Location: [facility];  Service: Maxillofacial     MYRINGOPLASTY      REFRACTIVE SURGERY Left 10/27/2023    TOE SURGERY      TONSILLECTOMY      Dr. Lester       Meds/Allergies:  Prior to Admission medications    Medication Sig Start Date End Date Taking? Authorizing Provider   ascorbic acid (VITAMIN C) 1000 MG tablet Take 1,000 mg by mouth daily   Yes Historical Provider, MD   aspirin (Aspirin 81) 81 mg EC tablet Take 1 tablet (81 mg total) by mouth daily 9/6/21  Yes MARY Dawson   busPIRone (BUSPAR) 10 mg tablet Take 0.5 tablets (5 mg total) by mouth 3 (three) times a day  Patient taking differently: Take 10 mg by mouth 3 (three) times a day 5/2/24  Yes MARY Gastelum   FLUoxetine (PROzac) 60 MG TABS Take 1 tablet (60 mg total) by mouth daily 11/20/23  Yes MARY Gastelum   fluticasone (FLONASE) 50 mcg/act nasal spray SPRAY 2 SPRAYS INTO EACH NOSTRIL EVERY DAY 10/30/23  Yes Javier Dior PA-C   levothyroxine 100 mcg tablet TAKE 1 TABLET BY MOUTH EVERY DAY 2/5/24  Yes MARY Gastelum   lisinopril (ZESTRIL) 20 mg tablet TAKE 1 TABLET BY MOUTH EVERY DAY 2/5/24  Yes MARY Gastelum   LORazepam (ATIVAN) 2 mg tablet Take 1 tablet (2 mg total) by mouth 2 (two) times a day as needed for anxiety 5/2/24  Yes MARY Gastelum   Melatonin 10 MG TABS Take by mouth once at bedtime   Yes Historical Provider, MD   Multiple Vitamin (MULTIVITAMIN) tablet Take 1 tablet by mouth daily   Yes Historical Provider, MD   ofloxacin (FLOXIN) 0.3 % otic solution Administer 5 drops into the left ear 2 (two) times a day As needed for otorrhea 12/21/23  Yes Candy Brown PA-C   TURMERIC PO Take 1,000 mg by mouth daily   Yes Historical Provider, MD   vitamin B-12 (VITAMIN B-12) 500 mcg tablet Take 5,000 mcg by mouth daily   Yes Historical Provider, MD   Vitamin D, Cholecalciferol, 50 MCG (2000 UT) CAPS Take by mouth daily   Yes Historical Provider, MD   acetaminophen (TYLENOL) 325 mg tablet Take 3 tablets (975 mg total) by mouth every 8 (eight) hours  "8/30/21   MARY Dawson   ibuprofen (MOTRIN) 400 mg tablet Take 1 tablet (400 mg total) by mouth every 8 (eight) hours as needed for mild pain for up to 7 days 8/30/21 9/1/22  MARY Dawson   tiZANidine (ZANAFLEX) 2 mg tablet TAKE 1 TABLET BY MOUTH EVERY 8 HOURS AS NEEDED FOR MUSCLE SPASM  Patient not taking: Reported on 6/11/2024 2/5/24   MARY Gastelum     I have reviewed home medications with patient personally.    Allergies: No Known Allergies    Social History:  Marital Status:    Occupation:   Patient Pre-hospital Living Situation: Home  Patient Pre-hospital Level of Mobility: walks  Patient Pre-hospital Diet Restrictions:   Substance Use History:   Social History     Substance and Sexual Activity   Alcohol Use Yes     Social History     Tobacco Use   Smoking Status Former    Types: Cigarettes   Smokeless Tobacco Never     Social History     Substance and Sexual Activity   Drug Use Never       Family History:  Family History   Problem Relation Age of Onset    Cancer Mother     No Known Problems Father     Breast cancer Sister 30    No Known Problems Sister     No Known Problems Sister     No Known Problems Sister     No Known Problems Daughter     Diabetes Maternal Grandmother     No Known Problems Maternal Grandfather     No Known Problems Paternal Grandmother     No Known Problems Paternal Grandfather     No Known Problems Maternal Aunt     No Known Problems Maternal Aunt     Breast cancer additional onset Neg Hx     BRCA2 Negative Neg Hx     BRCA2 Positive Neg Hx     BRCA1 Positive Neg Hx     BRCA1 Negative Neg Hx     BRCA 1/2 Neg Hx     Ovarian cancer Neg Hx     Endometrial cancer Neg Hx     Colon cancer Neg Hx        Physical Exam:     Vitals:   Blood Pressure: 149/74 (06/11/24 1525)  Pulse: 73 (06/11/24 1525)  Temperature: 97.9 °F (36.6 °C) (06/11/24 1525)  Temp Source: Tympanic (06/11/24 0612)  Respirations: 14 (06/11/24 1525)  Height: 5' 6\" (167.6 cm) (06/11/24 1221)  Weight " - Scale: 68.1 kg (150 lb 2.1 oz) (06/11/24 1221)  SpO2: 98 % (06/11/24 1525)    Physical Exam  Vitals and nursing note reviewed.   Constitutional:       General: She is not in acute distress.     Appearance: She is well-developed. She is not toxic-appearing or diaphoretic.   HENT:      Head: Normocephalic and atraumatic.   Eyes:      General: No scleral icterus.     Conjunctiva/sclera: Conjunctivae normal.   Cardiovascular:      Rate and Rhythm: Normal rate and regular rhythm.      Heart sounds: No murmur heard.     No friction rub. No gallop.   Pulmonary:      Effort: Pulmonary effort is normal. No respiratory distress.      Breath sounds: Normal breath sounds. No stridor. No wheezing, rhonchi or rales.   Chest:      Chest wall: No tenderness.   Abdominal:      General: There is no distension.      Palpations: Abdomen is soft. There is no mass.      Tenderness: There is abdominal tenderness. There is no guarding or rebound.      Hernia: No hernia is present.   Musculoskeletal:         General: No swelling or tenderness.      Cervical back: Neck supple.   Skin:     General: Skin is warm and dry.      Capillary Refill: Capillary refill takes less than 2 seconds.   Neurological:      Mental Status: She is alert and oriented to person, place, and time.   Psychiatric:         Mood and Affect: Mood normal.          Additional Data:     Lab Results:  Results from last 7 days   Lab Units 06/11/24  0621   WBC Thousand/uL 10.11   HEMOGLOBIN g/dL 13.7   HEMATOCRIT % 41.1   PLATELETS Thousands/uL 234   SEGS PCT % 89*   LYMPHO PCT % 7*   MONO PCT % 4   EOS PCT % 0     Results from last 7 days   Lab Units 06/11/24  0621   SODIUM mmol/L 133*   POTASSIUM mmol/L 3.7   CHLORIDE mmol/L 99   CO2 mmol/L 23   BUN mg/dL 19   CREATININE mg/dL 0.86   ANION GAP mmol/L 11   CALCIUM mg/dL 9.5   ALBUMIN g/dL 4.4   TOTAL BILIRUBIN mg/dL 0.70   ALK PHOS U/L 78   ALT U/L 15   AST U/L 19   GLUCOSE RANDOM mg/dL 136             Lab Results    Component Value Date    HGBA1C 5.2 02/24/2020     Results from last 7 days   Lab Units 06/11/24  1121   LACTIC ACID mmol/L 0.8       Lines/Drains:  Invasive Devices       Peripheral Intravenous Line  Duration             Peripheral IV 06/11/24 Right;Ventral (anterior) Forearm <1 day                        Imaging: Reviewed radiology reports from this admission including: abdominal/pelvic CT  CT abdomen pelvis with contrast   Final Result by Paco Fuentes MD (06/11 0716)      Acute inflammatory or infectious uncomplicated segmental colitis involving the splenic flexure through the descending colon.      Additional chronic findings and negatives as above.      The study was marked in EPIC for immediate notification.         Workstation performed: [id number]             EKG and Other Studies Reviewed on Admission:   EKG:  EKG reviewed.    ** Please Note: This note has been constructed using a voice recognition system. **

## 2024-06-11 NOTE — ED PROVIDER NOTES
History  Chief Complaint   Patient presents with    Abdominal Pain     States of abd pain since 1400 with vomiting twice and diarrhea with bright bloody stools.     73-year-old female presents with 1 day of nausea, NBNB vomiting, as well as bloody diarrhea.  Started with regular diarrhea and then became bloody over the course of the day.  She also reports chills, intermittent headaches.  Currently denies headache. History of colon cancer. Unsure if she had a resection      Abdominal Pain      Prior to Admission Medications   Prescriptions Last Dose Informant Patient Reported? Taking?   FLUoxetine (PROzac) 60 MG TABS 6/10/2024  No Yes   Sig: Take 1 tablet (60 mg total) by mouth daily   LORazepam (ATIVAN) 2 mg tablet 6/10/2024  No Yes   Sig: Take 1 tablet (2 mg total) by mouth 2 (two) times a day as needed for anxiety   Melatonin 10 MG TABS 6/10/2024 Self Yes Yes   Sig: Take by mouth once at bedtime   Multiple Vitamin (MULTIVITAMIN) tablet 6/10/2024 Self Yes Yes   Sig: Take 1 tablet by mouth daily   TURMERIC PO 6/10/2024 Self Yes Yes   Sig: Take 1,000 mg by mouth daily   Vitamin D, Cholecalciferol, 50 MCG (2000 UT) CAPS 6/10/2024 Self Yes Yes   Sig: Take by mouth daily   acetaminophen (TYLENOL) 325 mg tablet Unknown Self No No   Sig: Take 3 tablets (975 mg total) by mouth every 8 (eight) hours   ascorbic acid (VITAMIN C) 1000 MG tablet 6/10/2024 Self Yes Yes   Sig: Take 1,000 mg by mouth daily   aspirin (Aspirin 81) 81 mg EC tablet 6/11/2024 Self No Yes   Sig: Take 1 tablet (81 mg total) by mouth daily   busPIRone (BUSPAR) 10 mg tablet 6/10/2024 Self No Yes   Sig: Take 0.5 tablets (5 mg total) by mouth 3 (three) times a day   Patient taking differently: Take 10 mg by mouth 3 (three) times a day   fluticasone (FLONASE) 50 mcg/act nasal spray 6/10/2024  No Yes   Sig: SPRAY 2 SPRAYS INTO EACH NOSTRIL EVERY DAY   ibuprofen (MOTRIN) 400 mg tablet  Self No No   Sig: Take 1 tablet (400 mg total) by mouth every 8 (eight)  hours as needed for mild pain for up to 7 days   levothyroxine 100 mcg tablet 6/10/2024  No Yes   Sig: TAKE 1 TABLET BY MOUTH EVERY DAY   lisinopril (ZESTRIL) 20 mg tablet 6/10/2024  No Yes   Sig: TAKE 1 TABLET BY MOUTH EVERY DAY   ofloxacin (FLOXIN) 0.3 % otic solution 6/10/2024  No Yes   Sig: Administer 5 drops into the left ear 2 (two) times a day As needed for otorrhea   tiZANidine (ZANAFLEX) 2 mg tablet Not Taking  No No   Sig: TAKE 1 TABLET BY MOUTH EVERY 8 HOURS AS NEEDED FOR MUSCLE SPASM   Patient not taking: Reported on 6/11/2024   vitamin B-12 (VITAMIN B-12) 500 mcg tablet 6/10/2024 Self Yes Yes   Sig: Take 5,000 mcg by mouth daily      Facility-Administered Medications: None       Past Medical History:   Diagnosis Date    Anxiety     Cancer (HCC) 2009    Tonsils- followed by Oncology( Dr. pappas, Dr. Carrera)    Disease of thyroid gland        Past Surgical History:   Procedure Laterality Date    DENTAL SURGERY      INCISION AND DRAINAGE OF WOUND Right 08/28/2021    Procedure: INCISION AND DRAINAGE (I&D) HEAD/FACE;  Surgeon: Jeferson Dunn DMD;  Location: [facility];  Service: Maxillofacial    MOUTH SURGERY      MULTIPLE TOOTH EXTRACTIONS Right 08/28/2021    Procedure: EXTRACTION TEETH 25,26,27;  Surgeon: Jeferson Dunn DMD;  Location: [facility];  Service: Maxillofacial    MYRINGOPLASTY      REFRACTIVE SURGERY Left 10/27/2023    TOE SURGERY      TONSILLECTOMY      Dr. Lester       Family History   Problem Relation Age of Onset    Cancer Mother     No Known Problems Father     Breast cancer Sister 30    No Known Problems Sister     No Known Problems Sister     No Known Problems Sister     No Known Problems Daughter     Diabetes Maternal Grandmother     No Known Problems Maternal Grandfather     No Known Problems Paternal Grandmother     No Known Problems Paternal Grandfather     No Known Problems Maternal Aunt     No Known Problems Maternal Aunt     Breast cancer additional onset Neg Hx     BRCA2  Negative Neg Hx     BRCA2 Positive Neg Hx     BRCA1 Positive Neg Hx     BRCA1 Negative Neg Hx     BRCA 1/2 Neg Hx     Ovarian cancer Neg Hx     Endometrial cancer Neg Hx     Colon cancer Neg Hx      I have reviewed and agree with the history as documented.    E-Cigarette/Vaping    E-Cigarette Use Never User      E-Cigarette/Vaping Substances    Nicotine No     THC No     CBD No     Flavoring No     Other No     Unknown No      Social History     Tobacco Use    Smoking status: Former     Types: Cigarettes    Smokeless tobacco: Never   Vaping Use    Vaping status: Never Used   Substance Use Topics    Alcohol use: Yes    Drug use: Never       Review of Systems   Gastrointestinal:  Positive for abdominal pain.       Physical Exam  Physical Exam  Vitals and nursing note reviewed.   Constitutional:       Appearance: She is normal weight.   HENT:      Head: Normocephalic and atraumatic.   Eyes:      Conjunctiva/sclera: Conjunctivae normal.      Pupils: Pupils are equal, round, and reactive to light.   Cardiovascular:      Rate and Rhythm: Normal rate and regular rhythm.   Pulmonary:      Effort: Pulmonary effort is normal. No respiratory distress.   Abdominal:      Tenderness: There is generalized abdominal tenderness and tenderness in the epigastric area, suprapubic area and left lower quadrant.   Musculoskeletal:         General: No swelling or deformity.      Cervical back: Normal range of motion and neck supple.   Skin:     General: Skin is dry.      Coloration: Skin is not jaundiced.   Neurological:      General: No focal deficit present.      Mental Status: She is alert and oriented to person, place, and time.   Psychiatric:         Mood and Affect: Mood normal.         Thought Content: Thought content normal.         Vital Signs  ED Triage Vitals   Temperature Pulse Respirations Blood Pressure SpO2   06/11/24 0612 06/11/24 0612 06/11/24 0612 06/11/24 0612 06/11/24 0612   (!) 97.1 °F (36.2 °C) 92 18 166/93 94 %       Temp Source Heart Rate Source Patient Position - Orthostatic VS BP Location FiO2 (%)   06/11/24 0612 06/11/24 0700 06/11/24 0612 06/11/24 0612 --   Tympanic Monitor Lying Left arm       Pain Score       06/11/24 0612       9           Vitals:    06/11/24 2230 06/12/24 0350 06/12/24 0730 06/12/24 1508   BP: 122/63 132/65 120/68 149/75   Pulse: 86 79 82 73   Patient Position - Orthostatic VS:   Lying Sitting         Visual Acuity      ED Medications  Medications   acetaminophen (TYLENOL) tablet 975 mg (975 mg Oral Given 6/12/24 1026)   ondansetron (ZOFRAN) injection 4 mg (4 mg Intravenous Given 6/12/24 0403)   busPIRone (BUSPAR) tablet 5 mg (5 mg Oral Given 6/12/24 1715)   FLUoxetine (PROzac) capsule 60 mg (60 mg Oral Given 6/12/24 0848)   fluticasone (FLONASE) 50 mcg/act nasal spray 1 spray (1 spray Each Nare Given 6/12/24 0848)   levothyroxine tablet 100 mcg (100 mcg Oral Given 6/12/24 0750)   LORazepam (ATIVAN) tablet 2 mg (has no administration in time range)   pantoprazole (PROTONIX) injection 40 mg (40 mg Intravenous Given 6/12/24 0848)   piperacillin-tazobactam (ZOSYN) 4.5 g in sodium chloride 0.9 % 100 mL IV LOADING DOSE (4.5 g Intravenous New Bag 6/11/24 1657)     Followed by   piperacillin-tazobactam (ZOSYN) 4.5 g in sodium chloride 0.9 % 100 mL IVPB (EXTENDED INFUSION) (4.5 g Intravenous New Bag 6/12/24 1226)   lactated ringers infusion (100 mL/hr Intravenous New Bag 6/12/24 0344)   oxyCODONE (ROXICODONE) split tablet 2.5 mg ( Oral See Alternative 6/12/24 1610)     Or   oxyCODONE (ROXICODONE) IR tablet 5 mg (5 mg Oral Given 6/12/24 1610)   HYDROmorphone HCl (DILAUDID) injection 0.2 mg (0.2 mg Intravenous Given 6/12/24 0352)   sodium chloride 0.9 % bolus 1,000 mL (0 mL Intravenous Stopped 6/11/24 0824)   morphine injection 4 mg (4 mg Intravenous Given 6/11/24 0625)   ondansetron (ZOFRAN) injection 4 mg (4 mg Intravenous Given 6/11/24 0625)   magnesium sulfate 2 g/50 mL IVPB (premix) 2 g (0 g Intravenous  Stopped 6/11/24 0722)   iohexol (OMNIPAQUE) 350 MG/ML injection (MULTI-DOSE) 100 mL (100 mL Intravenous Given 6/11/24 0655)   magnesium sulfate 2 g/50 mL IVPB (premix) 2 g (2 g Intravenous New Bag 6/11/24 1122)       Diagnostic Studies  Results Reviewed       Procedure Component Value Units Date/Time    Clostridium difficile toxin by PCR with EIA [[id number]]  (Normal) Collected: 06/11/24 1521    Lab Status: Final result Specimen: Stool from Per Rectum Updated: 06/12/24 1244     C.difficile toxin by PCR Negative    Narrative:      This test has been performed using either the FDA-approved [employer] MAX system or the FDA-approved [employer](SPG) system for the qualitative detection of Clostridioides difficile toxin B gene (tcdB) in human liquid or soft stool specimens from patients suspected of having Clostridioides difficile infection using polymerase chain reaction (PCR) methodology.    Negative PCR results do not preclude infection and should not be used as the sole basis for treatment or other patient management decisions.    Positive PCR results are indicative of colonization or infection, but do not rule out co-infection with other bacteria or viruses.    As with all polymerase-chain reaction methodology, extremely low levels of target below the limit of detection may be detected, but results may not be reproducible.    All positive results are reflexed to a toxin A & B enzyme immunoassay (EIA) to distinguish between active infection and colonization.  Positive EIA results are indicative of an active infection.  Negative EIA results are indicative of colonization without active injection.    All test results should be used as an adjunct to clinical observations and other information available to the provider.    C-reactive protein [[id number]]  (Abnormal) Collected: 06/11/24 0623    Lab Status: Final result Specimen: Blood from Arm, Right Updated: 06/11/24 1552     CRP 9.0 mg/L     Ova and parasite examination [[id number]]  Collected: 06/11/24 1521    Lab Status: In process Specimen: Stool from Rectum Updated: 06/11/24 1549    UA w Reflex to Microscopic w Reflex to Culture [[id number]]  (Normal) Collected: 06/11/24 1414    Lab Status: Final result Specimen: Urine, Clean Catch Updated: 06/11/24 1429     Color, UA Yellow     Clarity, UA Clear     Specific Gravity, UA 1.010     pH, UA 6.0     Leukocytes, UA Negative     Nitrite, UA Negative     Protein, UA Negative mg/dl      Glucose, UA Negative mg/dl      Ketones, UA Negative mg/dl      Urobilinogen, UA 0.2 E.U./dl      Bilirubin, UA Negative     Occult Blood, UA Negative    Lactic acid, plasma (w/reflex if result > 2.0) [[id number]]  (Normal) Collected: 06/11/24 1121    Lab Status: Final result Specimen: Blood from Arm, Left Updated: 06/11/24 1144     LACTIC ACID 0.8 mmol/L     Narrative:      Result may be elevated if tourniquet was used during collection.    Magnesium [[id number]]  (Abnormal) Collected: 06/11/24 0623    Lab Status: Final result Specimen: Blood from Arm, Right Updated: 06/11/24 0643     Magnesium 1.7 mg/dL     Comprehensive metabolic panel [[id number]]  (Abnormal) Collected: 06/11/24 0621    Lab Status: Final result Specimen: Blood from Arm, Right Updated: 06/11/24 0643     Sodium 133 mmol/L      Potassium 3.7 mmol/L      Chloride 99 mmol/L      CO2 23 mmol/L      ANION GAP 11 mmol/L      BUN 19 mg/dL      Creatinine 0.86 mg/dL      Glucose 136 mg/dL      Calcium 9.5 mg/dL      AST 19 U/L      ALT 15 U/L      Alkaline Phosphatase 78 U/L      Total Protein 7.3 g/dL      Albumin 4.4 g/dL      Total Bilirubin 0.70 mg/dL      eGFR 67 ml/min/1.73sq m     Narrative:      National Kidney Disease Foundation guidelines for Chronic Kidney Disease (CKD):     Stage 1 with normal or high GFR (GFR > 90 mL/min/1.73 square meters)    Stage 2 Mild CKD (GFR = 60-89 mL/min/1.73 square meters)    Stage 3A Moderate CKD (GFR = 45-59 mL/min/1.73 square meters)    Stage 3B Moderate CKD (GFR =  30-44 mL/min/1.73 square meters)    Stage 4 Severe CKD (GFR = 15-29 mL/min/1.73 square meters)    Stage 5 End Stage CKD (GFR <15 mL/min/1.73 square meters)  Note: GFR calculation is accurate only with a steady state creatinine    Lipase [[id number]]  (Normal) Collected: 06/11/24 0621    Lab Status: Final result Specimen: Blood from Arm, Right Updated: 06/11/24 0643     Lipase 12 u/L     CBC and differential [[id number]]  (Abnormal) Collected: 06/11/24 0621    Lab Status: Final result Specimen: Blood from Arm, Right Updated: 06/11/24 0628     WBC 10.11 Thousand/uL      RBC 4.38 Million/uL      Hemoglobin 13.7 g/dL      Hematocrit 41.1 %      MCV 94 fL      MCH 31.3 pg      MCHC 33.3 g/dL      RDW 11.1 %      MPV 9.7 fL      Platelets 234 Thousands/uL      nRBC 0 /100 WBCs      Segmented % 89 %      Immature Grans % 0 %      Lymphocytes % 7 %      Monocytes % 4 %      Eosinophils Relative 0 %      Basophils Relative 0 %      Absolute Neutrophils 8.99 Thousands/µL      Absolute Immature Grans 0.03 Thousand/uL      Absolute Lymphocytes 0.71 Thousands/µL      Absolute Monocytes 0.37 Thousand/µL      Eosinophils Absolute 0.00 Thousand/µL      Basophils Absolute 0.01 Thousands/µL                    CT abdomen pelvis with contrast   Final Result by Paco Fuentes MD (06/11 0716)      Acute inflammatory or infectious uncomplicated segmental colitis involving the splenic flexure through the descending colon.      Additional chronic findings and negatives as above.      The study was marked in EPIC for immediate notification.         Workstation performed: [id number]                    Procedures  Procedures         ED Course                               SBIRT 22yo+      Flowsheet Row Most Recent Value   Initial Alcohol Screen: US AUDIT-C     1. How often do you have a drink containing alcohol? 1 Filed at: 06/11/2024 0625   2. How many drinks containing alcohol do you have on a typical day you are drinking?  0  Filed at: 06/11/2024 0625   3a. Male UNDER 65: How often do you have five or more drinks on one occasion? 0 Filed at: 06/11/2024 0625   3b. FEMALE Any Age, or MALE 65+: How often do you have 4 or more drinks on one occassion? 0 Filed at: 06/11/2024 0625   Audit-C Score 1 Filed at: 06/11/2024 0625   [place]: How many times in the past year have you...    Used an illegal drug or used a prescription medication for non-medical reasons? Never Filed at: 06/11/2024 0625                      Medical Decision Making  73 year old female with abdominal pain  Nausea, vomiting.  She is tender diffusely however most in the lower quadrants.  Differential includes diverticulitis, appendicitis, urinary tract infection, pancreatitis.  She reports BRBPR.    Will give morphine for some pain control, IV fluid, Zofran for nausea control.    Does appear she has a surgical history, will review chart, patient poor historian regarding this.    She is nontoxic-appearing.  Get a CBC look for leukocytosis and anemia, CMP to assess hepatobiliary function, renal failure, electrolyte abnormalities.    Amount and/or Complexity of Data Reviewed  Labs: ordered. Decision-making details documented in ED Course.  Radiology: ordered and independent interpretation performed. Decision-making details documented in ED Course.    Risk  Prescription drug management.  Parenteral controlled substances.  Decision regarding hospitalization.             Disposition  Final diagnoses:   Colitis   Bloody diarrhea     Time reflects when diagnosis was documented in both MDM as applicable and the Disposition within this note       Time User Action Codes Description Comment    6/11/2024  8:10 AM Mandeep Valenzuela Add [K52.9] Colitis     6/11/2024  8:10 AM Mandeep Valenzuela Add [R19.7] Bloody diarrhea           ED Disposition       ED Disposition   Admit    Condition   Stable    Date/Time   Tue Jun 11, 2024 0810    Comment   Case was discussed with Dr. Moo Estrada DO and the  patient's admission status was agreed to be Admission Status: inpatient status to the service of Dr. Estrada .               Follow-up Information    None         Current Discharge Medication List        CONTINUE these medications which have NOT CHANGED    Details   ascorbic acid (VITAMIN C) 1000 MG tablet Take 1,000 mg by mouth daily      aspirin (Aspirin 81) 81 mg EC tablet Take 1 tablet (81 mg total) by mouth daily  Refills: 0    Associated Diagnoses: Dental abscess      busPIRone (BUSPAR) 10 mg tablet Take 0.5 tablets (5 mg total) by mouth 3 (three) times a day  Qty: 270 tablet, Refills: 1    Associated Diagnoses: Anxiety      FLUoxetine (PROzac) 60 MG TABS Take 1 tablet (60 mg total) by mouth daily  Qty: 90 tablet, Refills: 3    Associated Diagnoses: Depression with anxiety      fluticasone (FLONASE) 50 mcg/act nasal spray SPRAY 2 SPRAYS INTO EACH NOSTRIL EVERY DAY  Qty: 48 mL, Refills: 4    Associated Diagnoses: Sinus pressure      levothyroxine 100 mcg tablet TAKE 1 TABLET BY MOUTH EVERY DAY  Qty: 90 tablet, Refills: 1    Associated Diagnoses: Acquired hypothyroidism      lisinopril (ZESTRIL) 20 mg tablet TAKE 1 TABLET BY MOUTH EVERY DAY  Qty: 90 tablet, Refills: 3    Associated Diagnoses: Hypertension, unspecified type      LORazepam (ATIVAN) 2 mg tablet Take 1 tablet (2 mg total) by mouth 2 (two) times a day as needed for anxiety  Qty: 60 tablet, Refills: 5    Comments: This request is for a new prescription for a controlled substance as required by Federal/State law.  Associated Diagnoses: Depression with anxiety      Melatonin 10 MG TABS Take by mouth once at bedtime      Multiple Vitamin (MULTIVITAMIN) tablet Take 1 tablet by mouth daily      ofloxacin (FLOXIN) 0.3 % otic solution Administer 5 drops into the left ear 2 (two) times a day As needed for otorrhea  Qty: 10 mL, Refills: 3    Associated Diagnoses: Tympanic membrane perforation, left      TURMERIC PO Take 1,000 mg by mouth daily      vitamin B-12  (VITAMIN B-12) 500 mcg tablet Take 5,000 mcg by mouth daily      Vitamin D, Cholecalciferol, 50 MCG (2000 UT) CAPS Take by mouth daily      acetaminophen (TYLENOL) 325 mg tablet Take 3 tablets (975 mg total) by mouth every 8 (eight) hours  Refills: 0    Associated Diagnoses: Dental abscess      ibuprofen (MOTRIN) 400 mg tablet Take 1 tablet (400 mg total) by mouth every 8 (eight) hours as needed for mild pain for up to 7 days  Qty: 21 tablet, Refills: 0    Associated Diagnoses: Dental abscess      tiZANidine (ZANAFLEX) 2 mg tablet TAKE 1 TABLET BY MOUTH EVERY 8 HOURS AS NEEDED FOR MUSCLE SPASM  Qty: 270 tablet, Refills: 1    Associated Diagnoses: Pain of left calf             No discharge procedures on file.    PDMP Review         Value Time User    PDMP Reviewed  Yes 8/2/2022  9:38 AM Ngozi Cordova MD            ED Provider  Electronically Signed by             Bob Adhikari DO  06/12/24 6560

## 2024-06-11 NOTE — PLAN OF CARE
Problem: PAIN - ADULT  Goal: Verbalizes/displays adequate comfort level or baseline comfort level  Description: Interventions:  - Encourage patient to monitor pain and request assistance  - Assess pain using appropriate pain scale  - Administer analgesics based on type and severity of pain and evaluate response  - Implement non-pharmacological measures as appropriate and evaluate response  - Consider cultural and social influences on pain and pain management  - Notify physician/advanced practitioner if interventions unsuccessful or patient reports new pain  Outcome: Progressing     Problem: INFECTION - ADULT  Goal: Absence or prevention of progression during hospitalization  Description: INTERVENTIONS:  - Assess and monitor for signs and symptoms of infection  - Monitor lab/diagnostic results  - Monitor all insertion sites, i.e. indwelling lines, tubes, and drains  - Monitor endotracheal if appropriate and nasal secretions for changes in amount and color  - [place] appropriate cooling/warming therapies per order  - Administer medications as ordered  - Instruct and encourage patient and family to use good hand hygiene technique  - Identify and instruct in appropriate isolation precautions for identified infection/condition  Outcome: Progressing  Goal: Absence of fever/infection during neutropenic period  Description: INTERVENTIONS:  - Monitor WBC    Outcome: Progressing

## 2024-06-11 NOTE — ED CARE HANDOFF
Emergency Department Sign Out Note        Sign out and transfer of care from Dr. Adhikari. See Separate Emergency Department note.     The patient, Radha Saini, was evaluated by the previous provider for abdominal pain w/ bloody diarrhea.    Workup Completed:  Labs    ED Course / Workup Pending (followup):  CT of A/P.                                  ED Course as of 06/11/24 0819   Tue Jun 11, 2024   0646 Received singout out from Dr. Adhikari, lower abdominal pain with bloody diarrhea CT of the abdomen pelvis with contrast pending, differential diagnosis at this point could be colitis versus diverticulitis.  Viewed labs at the time of transfer of care noted patient has a normal white count, only mild abnormality was a magnesium 1.7 and a sodium 133.   0651 Patient to CT imaging.    0750 Patient reassessed once CT results noted, patient appears to be still in some mild distress, reviewed CT imaging with the patient and  at the bedside, patient has evidence of colitis along the splenic flexure, prior history of thyroid cancer mostly with a history of having a feeding tube.  Last colonoscopy was in 2012, which according to the limited report in the EMR showed no significant abnormalities.   0752 [facility] contacted for admission.   0806 Reviewed case with Dr. Moo Estrada, [facility] provider, admit to      Procedures  Medical Decision Making  Amount and/or Complexity of Data Reviewed  Labs: ordered.  Radiology: ordered.    Risk  Prescription drug management.  Decision regarding hospitalization.            Disposition  Final diagnoses:   Colitis   Bloody diarrhea     Time reflects when diagnosis was documented in both MDM as applicable and the Disposition within this note       Time User Action Codes Description Comment    6/11/2024  8:10 AM Mandeep Valenzuela Add [K52.9] Colitis     6/11/2024  8:10 AM Mandeep Valenzuela Add [R19.7] Bloody diarrhea           ED Disposition       ED Disposition   Admit    Condition   Stable     Date/Time   Tue Jun 11, 2024  8:10 AM    Comment   Case was discussed with Dr. Moo Estrada DO and the patient's admission status was agreed to be Admission Status: inpatient status to the service of Dr. Estrada .               Follow-up Information    None       Patient's Medications   Discharge Prescriptions    No medications on file     No discharge procedures on file.       ED Provider  Electronically Signed by     Mandeep Valenzuela III, DO  06/11/24 0820

## 2024-06-11 NOTE — ASSESSMENT & PLAN NOTE
Patient reports acute onset of nausea vomiting and diarrhea 3 hours after eating yesterday  Also reports for the past week she has been taking ibuprofen several times a day after a dental procedure  She is having epigastric pain and pain in her left upper and right upper quadrants  She reports an episode of bloody diarrhea.  Will obtain stool studies, started on antibiotics with ongoing bloody diarrhea  Suspicion for infectious colitis, also possible peptic ulcer, if not improving over the next 24 hours will discuss with GI  Also consider ischemic colitis with involvement of the splenic flexure, conservative management patient does not have signs of peritonitis, fluid hydration bowel rest      6/12/24: Patient improving since admission, abdominal pain is slightly improved, has not had episodes of bloody diarrhea this morning.  Awaiting stool studies  Will continue on Zosyn  Appreciate gi consult

## 2024-06-11 NOTE — ASSESSMENT & PLAN NOTE
Patient reports acute onset of nausea vomiting and diarrhea 3 hours after eating yesterday  Also reports for the past week she has been taking ibuprofen several times a day after a dental procedure  She is having epigastric pain and pain in her left upper and right upper quadrants  She reports an episode of bloody diarrhea  Will obtain stool studies, monitor off antibiotics for now  Suspicion for infectious colitis, also possible peptic ulcer, if not improving over the next 24 hours will discuss with GI

## 2024-06-12 LAB
ALBUMIN SERPL BCP-MCNC: 3.6 G/DL (ref 3.5–5)
ALP SERPL-CCNC: 65 U/L (ref 34–104)
ALT SERPL W P-5'-P-CCNC: 11 U/L (ref 7–52)
ANION GAP SERPL CALCULATED.3IONS-SCNC: 6 MMOL/L (ref 4–13)
AST SERPL W P-5'-P-CCNC: 15 U/L (ref 13–39)
BASOPHILS # BLD AUTO: 0.03 THOUSANDS/ÂΜL (ref 0–0.1)
BASOPHILS NFR BLD AUTO: 0 % (ref 0–1)
BILIRUB SERPL-MCNC: 0.59 MG/DL (ref 0.2–1)
BUN SERPL-MCNC: 12 MG/DL (ref 5–25)
C COLI+JEJUNI TUF STL QL NAA+PROBE: NEGATIVE
C DIFF TOX GENS STL QL NAA+PROBE: NEGATIVE
CALCIUM SERPL-MCNC: 8.4 MG/DL (ref 8.4–10.2)
CHLORIDE SERPL-SCNC: 104 MMOL/L (ref 96–108)
CO2 SERPL-SCNC: 26 MMOL/L (ref 21–32)
CREAT SERPL-MCNC: 0.86 MG/DL (ref 0.6–1.3)
EC STX1+STX2 GENES STL QL NAA+PROBE: NEGATIVE
EOSINOPHIL # BLD AUTO: 0.05 THOUSAND/ÂΜL (ref 0–0.61)
EOSINOPHIL NFR BLD AUTO: 1 % (ref 0–6)
ERYTHROCYTE [DISTWIDTH] IN BLOOD BY AUTOMATED COUNT: 11.6 % (ref 11.6–15.1)
GFR SERPL CREATININE-BSD FRML MDRD: 67 ML/MIN/1.73SQ M
GLUCOSE SERPL-MCNC: 90 MG/DL (ref 65–140)
HCT VFR BLD AUTO: 36.1 % (ref 34.8–46.1)
HGB BLD-MCNC: 11.9 G/DL (ref 11.5–15.4)
IMM GRANULOCYTES # BLD AUTO: 0.02 THOUSAND/UL (ref 0–0.2)
IMM GRANULOCYTES NFR BLD AUTO: 0 % (ref 0–2)
LYMPHOCYTES # BLD AUTO: 0.76 THOUSANDS/ÂΜL (ref 0.6–4.47)
LYMPHOCYTES NFR BLD AUTO: 9 % (ref 14–44)
MCH RBC QN AUTO: 31.8 PG (ref 26.8–34.3)
MCHC RBC AUTO-ENTMCNC: 33 G/DL (ref 31.4–37.4)
MCV RBC AUTO: 97 FL (ref 82–98)
MONOCYTES # BLD AUTO: 0.55 THOUSAND/ÂΜL (ref 0.17–1.22)
MONOCYTES NFR BLD AUTO: 7 % (ref 4–12)
NEUTROPHILS # BLD AUTO: 6.78 THOUSANDS/ÂΜL (ref 1.85–7.62)
NEUTS SEG NFR BLD AUTO: 83 % (ref 43–75)
NRBC BLD AUTO-RTO: 0 /100 WBCS
PLATELET # BLD AUTO: 190 THOUSANDS/UL (ref 149–390)
PMV BLD AUTO: 10.2 FL (ref 8.9–12.7)
POTASSIUM SERPL-SCNC: 3.8 MMOL/L (ref 3.5–5.3)
PROT SERPL-MCNC: 5.9 G/DL (ref 6.4–8.4)
RBC # BLD AUTO: 3.74 MILLION/UL (ref 3.81–5.12)
SALMONELLA SP SPAO STL QL NAA+PROBE: NEGATIVE
SHIGELLA SP+EIEC IPAH STL QL NAA+PROBE: NEGATIVE
SODIUM SERPL-SCNC: 136 MMOL/L (ref 135–147)
WBC # BLD AUTO: 8.19 THOUSAND/UL (ref 4.31–10.16)

## 2024-06-12 PROCEDURE — C9113 INJ PANTOPRAZOLE SODIUM, VIA: HCPCS | Performed by: INTERNAL MEDICINE

## 2024-06-12 PROCEDURE — 99232 SBSQ HOSP IP/OBS MODERATE 35: CPT | Performed by: INTERNAL MEDICINE

## 2024-06-12 PROCEDURE — 80053 COMPREHEN METABOLIC PANEL: CPT | Performed by: INTERNAL MEDICINE

## 2024-06-12 PROCEDURE — 85025 COMPLETE CBC W/AUTO DIFF WBC: CPT | Performed by: INTERNAL MEDICINE

## 2024-06-12 PROCEDURE — 99222 1ST HOSP IP/OBS MODERATE 55: CPT | Performed by: STUDENT IN AN ORGANIZED HEALTH CARE EDUCATION/TRAINING PROGRAM

## 2024-06-12 PROCEDURE — 97161 PT EVAL LOW COMPLEX 20 MIN: CPT

## 2024-06-12 PROCEDURE — 97165 OT EVAL LOW COMPLEX 30 MIN: CPT

## 2024-06-12 RX ADMIN — FLUOXETINE HYDROCHLORIDE 60 MG: 20 CAPSULE ORAL at 08:48

## 2024-06-12 RX ADMIN — OXYCODONE HYDROCHLORIDE 5 MG: 5 TABLET ORAL at 23:01

## 2024-06-12 RX ADMIN — PANTOPRAZOLE SODIUM 40 MG: 40 INJECTION, POWDER, FOR SOLUTION INTRAVENOUS at 20:19

## 2024-06-12 RX ADMIN — ACETAMINOPHEN 975 MG: 325 TABLET ORAL at 10:26

## 2024-06-12 RX ADMIN — BUSPIRONE HYDROCHLORIDE 5 MG: 5 TABLET ORAL at 17:15

## 2024-06-12 RX ADMIN — PANTOPRAZOLE SODIUM 40 MG: 40 INJECTION, POWDER, FOR SOLUTION INTRAVENOUS at 08:48

## 2024-06-12 RX ADMIN — OXYCODONE HYDROCHLORIDE 5 MG: 5 TABLET ORAL at 16:10

## 2024-06-12 RX ADMIN — FLUTICASONE PROPIONATE 1 SPRAY: 50 SPRAY, METERED NASAL at 08:48

## 2024-06-12 RX ADMIN — BUSPIRONE HYDROCHLORIDE 5 MG: 5 TABLET ORAL at 08:48

## 2024-06-12 RX ADMIN — SODIUM CHLORIDE, SODIUM LACTATE, POTASSIUM CHLORIDE, AND CALCIUM CHLORIDE 100 ML/HR: .6; .31; .03; .02 INJECTION, SOLUTION INTRAVENOUS at 03:44

## 2024-06-12 RX ADMIN — LEVOTHYROXINE SODIUM 100 MCG: 0.1 TABLET ORAL at 07:50

## 2024-06-12 RX ADMIN — HYDROMORPHONE HYDROCHLORIDE 0.2 MG: 0.2 INJECTION, SOLUTION INTRAMUSCULAR; INTRAVENOUS; SUBCUTANEOUS at 03:52

## 2024-06-12 RX ADMIN — PIPERACILLIN AND TAZOBACTAM 4.5 G: 4; .5 INJECTION, POWDER, LYOPHILIZED, FOR SOLUTION INTRAVENOUS at 20:22

## 2024-06-12 RX ADMIN — PIPERACILLIN AND TAZOBACTAM 4.5 G: 4; .5 INJECTION, POWDER, LYOPHILIZED, FOR SOLUTION INTRAVENOUS at 04:06

## 2024-06-12 RX ADMIN — PIPERACILLIN AND TAZOBACTAM 4.5 G: 4; .5 INJECTION, POWDER, LYOPHILIZED, FOR SOLUTION INTRAVENOUS at 12:26

## 2024-06-12 RX ADMIN — ONDANSETRON 4 MG: 2 INJECTION INTRAMUSCULAR; INTRAVENOUS at 04:03

## 2024-06-12 RX ADMIN — LORAZEPAM 2 MG: 1 TABLET ORAL at 21:17

## 2024-06-12 NOTE — PLAN OF CARE
Problem: SAFETY ADULT  Goal: Patient will remain free of falls  Description: INTERVENTIONS:  - Educate patient/family on patient safety including physical limitations  - Instruct patient to call for assistance with activity   - Consult OT/PT to assist with strengthening/mobility   - Keep Call bell within reach  - Keep bed low and locked with side rails adjusted as appropriate  - Keep care items and personal belongings within reach  - Initiate and maintain comfort rounds  - Make Fall Risk Sign visible to staff  - Offer Toileting every 2 Hours, in advance of need  - Initiate/Maintain bed alarm  - Obtain necessary fall risk management equipment: Yellow socks  - Apply yellow socks and bracelet for high fall risk patients  - Consider moving patient to room near nurses station  Outcome: Progressing     Problem: GASTROINTESTINAL - ADULT  Goal: Minimal or absence of nausea and/or vomiting  Description: INTERVENTIONS:  - Administer IV fluids if ordered to ensure adequate hydration  - Maintain NPO status until nausea and vomiting are resolved  - Nasogastric tube if ordered  - Administer ordered antiemetic medications as needed  - Provide nonpharmacologic comfort measures as appropriate  - Advance diet as tolerated, if ordered  - Consider nutrition services referral to assist patient with adequate nutrition and appropriate food choices  Outcome: Progressing     Problem: HEMATOLOGIC - ADULT  Goal: Maintains hematologic stability  Description: INTERVENTIONS  - Assess for signs and symptoms of bleeding or hemorrhage  - Monitor labs  - Administer supportive blood products/factors as ordered and appropriate  Outcome: Progressing

## 2024-06-12 NOTE — CONSULTS
[facility] - Inpatient Consultation    PATIENT INFORMATION      Radha Saini 73 y.o. female MRN: [id number]  Unit/Bed#: -01 Encounter: [id number]  PCP: MARY Gastelum  Date of Admission:  6/11/2024  Date of Consultation: 06/12/24  Requesting Physician: Moo Maya, DO       ASSESSMENT & PLAN     Radha Saini is a 73 y.o. 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 GI consulted for further evaluation    Hematochezia  Colitis  Diarrhea  Nausea and Vomiting  Patient developed N/V/D 3 hours after eating store-bought macaroni salad. Symptoms progressively worsened leading to presentation to ED. While in ED, had 2 episodes of hematochezia. CT scan done for evaluation significant for circumferential thickening of the splenic flexure of the colon extending to the descending colon with minimal adjacent fat stranding concerning for infectious versus inflammatory versus ischemic colitis. Hgb has downtrended since presentation 13.7>12.9>11.9 with baseline 12-13, and all cell lines have decreased with IVF resuscitation. Blood in bowel movements has decreased, and patient reports no further bowel movements since early this morning. N/V have resolved, and currently tolerating clear liquids. LUQ abdominal pain persists, but overall is feeling better  Overall picture concerning for ischemic vs. Infectious colitis - however, with progression from diarrhea to hematochezia as well as distribution of colitis on CT raises overall concern for ischemia  Patient does have recent history of NSAID use with some epigastric tenderness - however, given stable labs and relatively stable labs low concern for UGI source of  bleeding  Reasonable to continue PPI BID - can transition to PO  Continue conservative management and avoid hypotension  Follow up infectious stool studies - if negative can D/C antibiotics from GI perspective  If clinically improving, will continue with conservative management   If fails to improve or clinically worsening can consider colonoscopy for further evaluation  Monitor hemoglobin and stool output    REASON FOR CONSULTATION      Rectal Bleeding, Colitis    HISTORY OF PRESENT ILLNESS      Radha Saini is a 73 y.o. 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 No prior episodes. Due to persistence of symptoms, presented to ED for evaluation. In ED, developed bright red blood in bowel movements with one episode of clots. Initial hemoglobin 13.7>12.9>11.9 throughout hospitalization. She reports that bowel movement early this morning with only small amount of blood and no further bleeding/bowel movements since that time. CT on presentation significant for acute inflammatory/infectious uncomplicated segmental colitis involving the splenic flexure through the descending colon which correlates to patient's current area of pain in her left upper quadrant. GI has been consulted for further evaluation.    Colonoscopy 2012 with Dr. Mata - hemorrhoids, no other lesions seen  Cologuard negative 10/20/2022    Patient does have recent history of oral surgery for which she was taking NSAIDs for pain recently.     No family history of colon CA or IBD.     REVIEW OF SYSTEMS     CONSTITUTIONAL: Denies any fever, chills, rigors, and weight loss  HEENT: No earache or tinnitus, denies hearing loss or visual disturbances  CARDIOVASCULAR: No chest pain or palpitations    RESPIRATORY: Denies any cough, hemoptysis, shortness of breath or dyspnea on exertion  GASTROINTESTINAL: As noted in the History of Present Illness   GENITOURINARY: No problems with urination, denies any hematuria or dysuria  NEUROLOGIC: No dizziness or vertigo, denies headaches   MUSCULOSKELETAL: Denies any muscle or joint pain   SKIN: Denies skin rashes or itching   ENDOCRINE: Denies excessive thirst, denies intolerance to heat or cold  PSYCHOSOCIAL: Denies depression or anxiety, denies any recent memory loss     PAST MEDICAL & SURGICAL HISTORY      Past Medical History:   Diagnosis Date    Anxiety     Cancer (HCC) 2009    Tonsils- followed by Oncology( Dr. pappas, Dr. Carrera)    Disease of thyroid gland        Past Surgical History:   Procedure Laterality Date    DENTAL SURGERY      INCISION AND DRAINAGE OF WOUND Right 08/28/2021    Procedure: INCISION AND DRAINAGE (I&D) HEAD/FACE;  Surgeon: Jeferson Dunn DMD;  [facility];  Service: Maxillofacial    MOUTH SURGERY      MULTIPLE TOOTH EXTRACTIONS Right 08/28/2021    Procedure: EXTRACTION TEETH 25,26,27;  Surgeon: Jeferson Dunn DMD;  Location: [facility];  Service: Maxillofacial    MYRINGOPLASTY      REFRACTIVE SURGERY Left 10/27/2023    TOE SURGERY      TONSILLECTOMY      Dr. Lester       MEDICATIONS & ALLERGIES       Medications:     Medications Prior to Admission:     ascorbic acid (VITAMIN C) 1000 MG tablet    aspirin (Aspirin 81) 81 mg EC tablet    busPIRone (BUSPAR) 10 mg tablet    FLUoxetine (PROzac) 60 MG TABS    fluticasone (FLONASE) 50 mcg/act nasal spray    levothyroxine 100 mcg tablet    lisinopril (ZESTRIL) 20 mg tablet    LORazepam (ATIVAN) 2 mg tablet    Melatonin 10 MG TABS    Multiple Vitamin (MULTIVITAMIN) tablet    ofloxacin (FLOXIN) 0.3 % otic solution    TURMERIC PO    vitamin B-12 (VITAMIN B-12) 500 mcg tablet    Vitamin D, Cholecalciferol, 50 MCG (2000 UT) CAPS    acetaminophen (TYLENOL) 325 mg tablet    ibuprofen  (MOTRIN) 400 mg tablet    tiZANidine (ZANAFLEX) 2 mg tablet  Current Facility-Administered Medications   Medication Dose Route Frequency    acetaminophen (TYLENOL) tablet 975 mg  975 mg Oral Q8H PRN    busPIRone (BUSPAR) tablet 5 mg  5 mg Oral BID    FLUoxetine (PROzac) capsule 60 mg  60 mg Oral Daily    fluticasone (FLONASE) 50 mcg/act nasal spray 1 spray  1 spray Each Nare Daily    HYDROmorphone HCl (DILAUDID) injection 0.2 mg  0.2 mg Intravenous Q3H PRN    lactated ringers infusion  100 mL/hr Intravenous Continuous    levothyroxine tablet 100 mcg  100 mcg Oral Early Morning    LORazepam (ATIVAN) tablet 2 mg  2 mg Oral BID PRN    ondansetron (ZOFRAN) injection 4 mg  4 mg Intravenous Q8H PRN    oxyCODONE (ROXICODONE) split tablet 2.5 mg  2.5 mg Oral Q6H PRN    Or    oxyCODONE (ROXICODONE) IR tablet 5 mg  5 mg Oral Q6H PRN    pantoprazole (PROTONIX) injection 40 mg  40 mg Intravenous Q12H ARASH    piperacillin-tazobactam (ZOSYN) 4.5 g in sodium chloride 0.9 % 100 mL IVPB (EXTENDED INFUSION)  4.5 g Intravenous Q8H       Allergies:   No Known Allergies    SOCIAL HISTORY      Social History     Marital Status:     Substance Use History:   Social History     Substance and Sexual Activity   Alcohol Use Yes     Social History     Tobacco Use   Smoking Status Former    Types: Cigarettes   Smokeless Tobacco Never     Social History     Substance and Sexual Activity   Drug Use Never       FAMILY HISTORY      Family History   Problem Relation Age of Onset    Cancer Mother     No Known Problems Father     Breast cancer Sister 30    No Known Problems Sister     No Known Problems Sister     No Known Problems Sister     No Known Problems Daughter     Diabetes Maternal Grandmother     No Known Problems Maternal Grandfather     No Known Problems Paternal Grandmother     No Known Problems Paternal Grandfather     No Known Problems Maternal Aunt     No Known Problems Maternal Aunt     Breast cancer additional onset Neg Hx      "BRCA2 Negative Neg Hx     BRCA2 Positive Neg Hx     BRCA1 Positive Neg Hx     BRCA1 Negative Neg Hx     BRCA 1/2 Neg Hx     Ovarian cancer Neg Hx     Endometrial cancer Neg Hx     Colon cancer Neg Hx        PHYSICAL EXAM     Objective   Blood pressure 120/68, pulse 82, temperature 98.1 °F (36.7 °C), temperature source Oral, resp. rate 18, height 5' 6\" (1.676 m), weight 73 kg (160 lb 15 oz), SpO2 93%. Body mass index is 25.98 kg/m².    Intake/Output Summary (Last 24 hours) at 6/12/2024 1131  Last data filed at 6/12/2024 0900  Gross per 24 hour   Intake 1455 ml   Output --   Net 1455 ml       General Appearance:   Alert, cooperative, no distress   HEENT:   Normocephalic, atraumatic, anicteric     Neck:   Supple, symmetrical, trachea midline   Lungs:   Equal chest rise, respirations unlabored    Heart:   Regular rate and rhythm   Abdomen:   Soft, L-sided abdominal tenderness, non-distended; normal bowel sounds; no masses, no organomegaly    Rectal:   Deferred    Extremities:   No cyanosis, clubbing or edema    Neuro:   Moves all 4 extremities    Skin:   No jaundice, rashes, or lesions      ADDITIONAL DATA     Lab Results:     Results from last 7 days   Lab Units 06/12/24  0436   WBC Thousand/uL 8.19   HEMOGLOBIN g/dL 11.9   HEMATOCRIT % 36.1   PLATELETS Thousands/uL 190   SEGS PCT % 83*   LYMPHO PCT % 9*   MONO PCT % 7   EOS PCT % 1     Results from last 7 days   Lab Units 06/12/24  0436   POTASSIUM mmol/L 3.8   CHLORIDE mmol/L 104   CO2 mmol/L 26   BUN mg/dL 12   CREATININE mg/dL 0.86   CALCIUM mg/dL 8.4   ALK PHOS U/L 65   ALT U/L 11   AST U/L 15           Imaging:    CT abdomen pelvis with contrast    Result Date: 6/11/2024  Narrative: CT ABDOMEN AND PELVIS WITH IV CONTRAST INDICATION: abd pain, nausea, vomiting, bloody diarrhea. r/o diverticulitis/colitis. COMPARISON: None. TECHNIQUE: CT examination of the abdomen and pelvis was performed. Multiplanar 2D reformatted images were created from the source data. This " examination, like all CT scans performed in the [facility], was performed utilizing techniques to minimize radiation dose exposure, including the use of iterative reconstruction and automated exposure control. Radiation dose length product (DLP) for this visit: 662.52 mGy-cm IV Contrast: 100 mL of iohexol (OMNIPAQUE) 350 Enteric Contrast: Not administered. FINDINGS: ABDOMEN LOWER CHEST: No clinically significant abnormality in the visualized lower chest. LIVER/BILIARY TREE: Unremarkable. GALLBLADDER: No calcified gallstones. No pericholecystic inflammatory change. SPLEEN: Unremarkable. PANCREAS: Diffuse atrophy of the pancreas. No acute findings. ADRENAL GLANDS: Unremarkable. KIDNEYS/URETERS: Unremarkable. No hydronephrosis. STOMACH AND BOWEL: Circumferential thickening of the splenic flexure of the colon extending through the descending colon with minimal adjacent fat stranding. No pneumatosis coli. Nondistended stomach. Unremarkable small bowel. APPENDIX: Noninflamed appendix. ABDOMINOPELVIC CAVITY: Trace free fluid in the left paracolic gutter. No abscess. No pneumoperitoneum. No lymphadenopathy. VESSELS: Mild atherosclerotic disease. No abdominal aortic aneurysm. Prominent bilateral adnexal and ovarian veins, left greater than right. This may be sequela of chronic pelvic vessel congestion syndrome or can be seen as a normal variant in asymptomatic patients. PELVIS REPRODUCTIVE ORGANS: Retroverted uterus with mildly lobulated anterior fundus suggestive of small underlying fibroid. 2 cm simple appearing left ovarian cyst for which no specific follow-up is warranted per current guidelines. URINARY BLADDER: Unremarkable. ABDOMINAL WALL/INGUINAL REGIONS: Minuscule fat-containing umbilical hernia. BONES: No acute fracture or osseous destructive lesion identified. Mild chronic appearing compression fracture of the superior endplate of L3 accentuated by prominent Schmorl's node. Degenerative changes of  the spine, pubic symphysis, and multiple joints.     Impression: Acute inflammatory or infectious uncomplicated segmental colitis involving the splenic flexure through the descending colon. Additional chronic findings and negatives as above. The study was marked in EPIC for immediate notification. Workstation performed: [id number]       EKG, Pathology, and Other Studies Reviewed on Admission:   EKG: Reviewed      Counseling / Coordination of Care Time: 30 total mins spent in consult. Greater than 50% of total time spent on patient counseling and coordination of care.    Polina Sosa DO  Gastroenterology Fellow  [facility]  Division of Gastroenterology and Hepatology  Available on [employer]  ...............................................................................................................................................  ** Please Note: This note is constructed using a voice recognition dictation system. **

## 2024-06-12 NOTE — PLAN OF CARE
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
## 2024-06-12 NOTE — OCCUPATIONAL THERAPY NOTE
Occupational Therapy Evaluation     Patient Name: Radha Saini  Today's Date: 6/12/2024  Problem List  Principal Problem:    Colitis  Active Problems:    Acquired hypothyroidism    Bloody diarrhea    Past Medical History  Past Medical History:   Diagnosis Date    Anxiety     Cancer (HCC) 2009    Tonsils- followed by Oncology( Dr. pappas, Dr. Carrera)    Disease of thyroid gland      Past Surgical History  Past Surgical History:   Procedure Laterality Date    DENTAL SURGERY      INCISION AND DRAINAGE OF WOUND Right 08/28/2021    Procedure: INCISION AND DRAINAGE (I&D) HEAD/FACE;  Surgeon: Jeferson Dunn DMD;  [facility]: [facility];  Service: Maxillofacial    MOUTH SURGERY      MULTIPLE TOOTH EXTRACTIONS Right 08/28/2021    Procedure: EXTRACTION TEETH 25,26,27;  Surgeon: Jeferson Dunn DMD;  [facility];  Service: Maxillofacial    MYRINGOPLASTY      REFRACTIVE SURGERY Left 10/27/2023    TOE SURGERY      TONSILLECTOMY      Dr. Lester           06/12/24 1104   OT Last Visit   OT Visit Date 06/12/24   Note Type   Note type Evaluation   Additional Comments Nsg staff verbally cleared pt for OT evaluation.  Pt received  supine in bed c HOB semi-elevated on this date reporting 7/10 pain + is agreeable to OT session @ this time. @ exit, pt remains in  seated in bedside recliner c all lines intact, all needs met, call bell in hand + nsg aware of location/disposition.   Pain Assessment   Pain Assessment Tool 0-10   Pain Score 7   Pain Location/Orientation Orientation: Left  (Flank)   Pain Onset/Description Frequency: Constant/Continuous   Patient's Stated Pain Goal No pain   Hospital Pain Intervention(s) Medication (See MAR)   Restrictions/Precautions   Weight Bearing Precautions Per Order No   Other Precautions Pain;Contact/isolation  (IV line)   Home Living   Type of Home House   Home Layout One level;Laundry in basement  (0 DEANNE, finished basement)   Bathroom Shower/Tub Tub/shower unit   Bathroom Toilet  "Standard   Bathroom Equipment   (None)   Bathroom Accessibility Accessible   Home Equipment   (None)   Prior Function   Level of Centre Independent with ADLs;Independent with functional mobility;Independent with IADLS   Lives With Significant other  (of 30 years)   Receives Help From Family   IADLs Independent with driving;Independent with meal prep;Independent with medication management   Falls in the last 6 months 0   Vocational Retired  (CNA)   Lifestyle   Autonomy Pt lives in  1 story home c significant other + @ baseline, performs ADLs  I'ly, IADLs I'ly + fxl mobility I'ly without AD. (+) . Pt is retired.   Reciprocal Relationships Significant other   Service to Others Family relations   Intrinsic Gratification Pet sits   Subjective   Subjective \"want me to do Pilates?\"   ADL   Eating Assistance 7  Independent   Grooming Assistance 7  Independent   UB Bathing Assistance 7  Independent   LB Bathing Assistance 7  Independent   UB Dressing Assistance 7  Independent   LB Dressing Assistance 7  Independent   Toileting Assistance  7  Independent   Functional Assistance 7  Independent   Bed Mobility   Supine to Sit 7  Independent   Transfers   Sit to Stand 7  Independent   Stand to Sit 7  Independent   Functional Mobility   Additional Comments Pt performed short distance ADL related fxl mobility in room I'ly simulating toileting distances.   No overt LOB demonstrated + pt c/o pain /  denies SOB/ denies dizziness during transitional movements. Reports feeling steady on BLE. F safety awareness noted while navigating t/o room. Transitions to bedside recliner for remainder of session.  (Rapid pace; cueing for safety)   Balance   Static Sitting Normal   Dynamic Sitting Normal   Static Standing Normal   Dynamic Standing Normal   Ambulatory Normal   Activity Tolerance   Activity Tolerance Patient limited by pain   Medical Staff Made Aware PT/OT co-eval on this date d/t medical complexity, ambulatory dysfunction c " high fall risk, various impeding lines + requirement for skilled interdisciplinary analysis of appropriate d/c recommendations. PT/OT POC/goals I'ly determined per respective discipline. (Karol)  (Kevin)   Nurse Made Aware Medical staff made aware of current fxn, recommendations for d/c planning, fall risk + pt's location upon exit. (Jennifer)   RUE Assessment   RUE Assessment WFL   LUE Assessment   LUE Assessment WFL   Sensation   Additional Comments None reported   Vision-Basic Assessment   Current Vision Wears glasses only for reading   Cognition   Overall Cognitive Status WFL   Attention Within functional limits   Memory Within functional limits   Following Commands Follows one step commands without difficulty   Assessment   Prognosis Good   Assessment Patient is a 73 y.o. female seen for OT evaluation s/p admit to  [facility] on 6/11/2024 w/Colitis + commorbidities/PMHx (as listed in medical record) affecting patient's functional performance c ADL tasks at time of assessment. OT orders placed for evaluation and treatment to assess pt's ADLs, cognitive status + performance during functional tasks in order to formulate appropriate d/c recommendations.     Therapist performed at least two patient identifiers during session including name and wristband. Personal factors affecting patient at time of initial evaluation include: step(s) to enter environment.   Pt's clinical presentation is currently evolving. This evaluation required an extensive review of medical and/or therapy records and additional review of physical, cognitive and psychosocial history related to functional performance. Based upon functional performance deficits and assessments, this evaluation has been identified as a  low complexity evaluation.    @ this time, pt presents @ baseline fxn including I c ADLs/fxl mobility. D/t aforementioned factors, no further skilled occupational therapy services warranted during hospitalization/ following  return home.   Goals   Patient Goals Go home   Discharge Recommendation   Rehab Resource Intensity Level, OT No post-acute rehabilitation needs   AM-PAC Daily Activity Inpatient   Lower Body Dressing 4   Bathing 4   Toileting 4   Upper Body Dressing 4   Grooming 4   Eating 4   Daily Activity Raw Score 24   Daily Activity Standardized Score (Calc for Raw Score >=11) 57.54   AM-PAC Applied Cognition Inpatient   Following a Speech/Presentation 4   Understanding Ordinary Conversation 4   Taking Medications 4   Remembering Where Things Are Placed or Put Away 4   Remembering List of 4-5 Errands 4   Taking Care of Complicated Tasks 4   Applied Cognition Raw Score 24   Applied Cognition Standardized Score 62.21   Barthel Index   Feeding 10   Bathing 5   Grooming Score 5   Dressing Score 10   Bladder Score 10   Bowels Score 10   Toilet Use Score 10   Transfers (Bed/Chair) Score 15   Mobility (Level Surface) Score 15   Stairs Score 10   Barthel Index Score 100   End of Consult   Patient Position at End of Consult Bedside chair;All needs within reach   Nurse Communication Nurse aware of consult     The patient's raw score on the AM-PAC Daily Activity inpatient short form is 24, standardized score is 57.54, greater than 39.4. Patients at this level are likely to benefit from DC to home. Please refer to the recommendation of the Occupational Therapist for safe DC planning.    Corry Mendoza OTR/L

## 2024-06-12 NOTE — UTILIZATION REVIEW
Initial Clinical Review    Admission: Date/Time/Statement:   Admission Orders (From admission, onward)       Ordered        06/11/24 0811  INPATIENT ADMISSION  Once                          Orders Placed This Encounter   Procedures    INPATIENT ADMISSION     Standing Status:   Standing     Number of Occurrences:   1     Order Specific Question:   Level of Care     Answer:   Med Surg [16]     Order Specific Question:   Estimated length of stay     Answer:   More than 2 Midnights     Order Specific Question:   Certification     Answer:   I certify that inpatient services are medically necessary for this patient for a duration of greater than two midnights. See H&P and MD Progress Notes for additional information about the patient's course of treatment.     ED Arrival Information       Expected   -    Arrival   6/11/2024 06:00    Acuity   Urgent              Means of arrival   Walk-In    Escorted by   [facility]    Service   Hospitalist    Admission type   Emergency              Arrival complaint   abd pain             Chief Complaint   Patient presents with    Abdominal Pain     States of abd pain since 1400 with vomiting twice and diarrhea with bright bloody stools.       Initial Presentation: 73 y.o. female with a PMH of anxiety, thyroid cancer, hypothyroidism who presents with diarrhea abdominal pain nausea vomiting. Patient reports eating yesterday and experiencing acute onset of abdominal pain with diarrhea and nausea with nonbloody nonbilious emesis. Reports having several episodes of diarrhea eventually progressing to an episode of bloody diarrhea prompting her to present to the ER for further evaluation. CT abdomen pelvis concerning for infectious segmental colitis from the splenic flexure through the descending colon. Plan: Inpatient admission for evaluation and treatment of colitis, hypothyroidism: obtain stool studies, IV PPI, IV Zosyn, continue levothyroxine.     Anticipated Length of Stay/Certification  Statement: Patient will be admitted on an inpatient basis with an anticipated length of stay of greater than 2 midnights secondary to colitis.     Date: 6/12  Day 3: Has surpassed a 2nd midnight with active treatments and services. Patient improving since admission, abdominal pain is slightly improved, has not had episodes of bloody diarrhea this morning. Awaiting stool studies. Continue IV Zosyn.       ED Triage Vitals [06/11/24 0612]   Temperature Pulse Respirations Blood Pressure SpO2 Pain Score   (!) 97.1 °F (36.2 °C) 92 18 166/93 94 % 9     Weight (last 2 days)       Date/Time Weight    06/12/24 0537 73 (160.94)    06/12/24 0503 73 (160.94)    06/11/24 1221 68.1 (150.13)    06/11/24 0612 68 (150)            Vital Signs (last 3 days)       Date/Time Temp Pulse Resp BP MAP (mmHg) SpO2 O2 Device Patient Position - Orthostatic VS Cy Coma Scale Score Pain    06/12/24 1104 -- -- -- -- -- -- -- -- -- 7 06/12/24 1059 -- -- -- -- -- -- -- -- -- 7    06/12/24 1026 -- -- -- -- -- -- -- -- -- 9    06/12/24 07:30:33 98.1 °F (36.7 °C) 82 18 120/68 85 93 % None (Room air) Lying -- --    06/12/24 0352 -- -- -- -- -- -- -- -- -- 6 06/12/24 03:50:47 97.9 °F (36.6 °C) 79 18 132/65 87 95 % -- -- -- --    06/11/24 22:30:36 98 °F (36.7 °C) 86 18 122/63 83 92 % -- -- -- --    06/11/24 2050 -- -- -- -- -- -- -- -- 15 5    06/11/24 1657 -- -- -- -- -- -- -- -- -- 10 - Worst Possible Pain    06/11/24 15:25:28 97.9 °F (36.6 °C) 73 14 149/74 99 98 % -- -- -- --    06/11/24 1433 -- -- -- -- -- -- -- -- -- 8    06/11/24 1230 -- -- -- -- -- -- None (Room air) -- 15 4    06/11/24 12:27:01 97.3 °F (36.3 °C) 82 18 112/62 79 95 % -- -- -- --    06/11/24 1211 -- -- -- -- -- -- -- -- -- 2    06/11/24 1200 -- 83 18 -- -- 93 % None (Room air) -- -- --    06/11/24 1148 -- 73 19 140/68 -- 95 % None (Room air) Sitting -- --    06/11/24 1000 -- 78 18 -- -- 95 % None (Room air) Sitting -- --    06/11/24 0925 -- -- -- -- -- -- -- -- -- 6     06/11/24 0828 -- -- -- -- -- -- -- -- 15 --    06/11/24 0730 -- 86 18 158/72 103 94 % None (Room air) Sitting -- --    06/11/24 0700 -- 94 18 148/69 99 96 % None (Room air) Sitting -- 4    06/11/24 0634 -- 88 -- -- -- -- -- -- -- --    06/11/24 0626 -- -- -- -- -- -- -- -- 15 --    06/11/24 0625 -- -- -- -- -- -- -- -- -- 9    06/11/24 0612 97.1 °F (36.2 °C) 92 18 166/93 -- 94 % None (Room air) Lying -- 9              Pertinent Labs/Diagnostic Test Results:   Radiology:  CT abdomen pelvis with contrast   Final Interpretation by Paco Fuentes MD (06/11 0716)      Acute inflammatory or infectious uncomplicated segmental colitis involving the splenic flexure through the descending colon.      Additional chronic findings and negatives as above.      The study was marked in EPIC for immediate notification.         Workstation performed: [id number]           Cardiology:  ECG 12 lead   Final Result by Johnnie Michaels MD (06/11 7273)   Normal sinus rhythm   Minimal voltage criteria for LVH, may be normal variant   Nonspecific T wave abnormality   Abnormal ECG   When compared with ECG of 10-SEP-2021 11:08,   New T wave flattening in Anterior leads   Confirmed by Johnnie Michaels ([id number]) on 6/11/2024 5:33:44 PM        GI:  No orders to display           Results from last 7 days   Lab Units 06/12/24 0436 06/11/24 2017 06/11/24 0621   WBC Thousand/uL 8.19 11.65* 10.11   HEMOGLOBIN g/dL 11.9 12.9 13.7   HEMATOCRIT % 36.1 38.2 41.1   PLATELETS Thousands/uL 190 217 234   TOTAL NEUT ABS Thousands/µL 6.78  --  8.99*         Results from last 7 days   Lab Units 06/12/24  0436 06/11/24 0623 06/11/24 0621   SODIUM mmol/L 136  --  133*   POTASSIUM mmol/L 3.8  --  3.7   CHLORIDE mmol/L 104  --  99   CO2 mmol/L 26  --  23   ANION GAP mmol/L 6  --  11   BUN mg/dL 12  --  19   CREATININE mg/dL 0.86  --  0.86   EGFR ml/min/1.73sq m 67  --  67   CALCIUM mg/dL 8.4  --  9.5   MAGNESIUM mg/dL  --  1.7*  --      Results from  last 7 days   Lab Units 06/12/24  0436 06/11/24  0621   AST U/L 15 19   ALT U/L 11 15   ALK PHOS U/L 65 78   TOTAL PROTEIN g/dL 5.9* 7.3   ALBUMIN g/dL 3.6 4.4   TOTAL BILIRUBIN mg/dL 0.59 0.70         Results from last 7 days   Lab Units 06/12/24  0436 06/11/24  0621   GLUCOSE RANDOM mg/dL 90 136         Results from last 7 days   Lab Units 06/11/24  1121   LACTIC ACID mmol/L 0.8           Results from last 7 days   Lab Units 06/11/24  0621   LIPASE u/L 12     Results from last 7 days   Lab Units 06/11/24  0623   CRP mg/L 9.0*             Results from last 7 days   Lab Units 06/11/24  1414   CLARITY UA  Clear   COLOR UA  Yellow   SPEC GRAV UA  1.010   PH UA  6.0   GLUCOSE UA mg/dl Negative   KETONES UA mg/dl Negative   BLOOD UA  Negative   PROTEIN UA mg/dl Negative   NITRITE UA  Negative   BILIRUBIN UA  Negative   UROBILINOGEN UA E.U./dl 0.2   LEUKOCYTES UA  Negative         ED Treatment-Medication Administration from 06/11/2024 0559 to 06/11/2024 1218         Date/Time Order Dose Route Action     06/11/2024 0624 sodium chloride 0.9 % bolus 1,000 mL 1,000 mL Intravenous New Bag     06/11/2024 0625 morphine injection 4 mg 4 mg Intravenous Given     06/11/2024 0625 ondansetron (ZOFRAN) injection 4 mg 4 mg Intravenous Given     06/11/2024 0702 magnesium sulfate 2 g/50 mL IVPB (premix) 2 g 2 g Intravenous New Bag     06/11/2024 0655 iohexol (OMNIPAQUE) 350 MG/ML injection (MULTI-DOSE) 100 mL 100 mL Intravenous Given     06/11/2024 0925 acetaminophen (TYLENOL) tablet 975 mg 975 mg Oral Given     06/11/2024 1142 aspirin (ECOTRIN LOW STRENGTH) EC tablet 81 mg 81 mg Oral Given     06/11/2024 1142 busPIRone (BUSPAR) tablet 5 mg 5 mg Oral Given     06/11/2024 1142 FLUoxetine (PROzac) capsule 60 mg 60 mg Oral Given     06/11/2024 1142 fluticasone (FLONASE) 50 mcg/act nasal spray 1 spray 1 spray Each Nare Given     06/11/2024 1122 pantoprazole (PROTONIX) injection 40 mg 40 mg Intravenous Given     06/11/2024 1122 magnesium  sulfate 2 g/50 mL IVPB (premix) 2 g 2 g Intravenous New Bag            Past Medical History:   Diagnosis Date    Anxiety     Cancer (HCC) 2009    Tonsils- followed by Oncology( Dr. pappas, Dr. Carrera)    Disease of thyroid gland      Present on Admission:   Acquired hypothyroidism      Admitting Diagnosis: Colitis [K52.9]  Abdominal pain [R10.9]  Bloody diarrhea [R19.7]  Age/Sex: 73 y.o. female  Admission Orders:  Scheduled Medications:  busPIRone, 5 mg, Oral, BID  FLUoxetine, 60 mg, Oral, Daily  fluticasone, 1 spray, Each Nare, Daily  levothyroxine, 100 mcg, Oral, Early Morning  pantoprazole, 40 mg, Intravenous, Q12H ARASH  piperacillin-tazobactam, 4.5 g, Intravenous, Q8H      Continuous IV Infusions:  lactated ringers, 100 mL/hr, Intravenous, Continuous      PRN Meds:  acetaminophen, 975 mg, Oral, Q8H PRN  HYDROmorphone, 0.2 mg, Intravenous, Q3H PRN  LORazepam, 2 mg, Oral, BID PRN  ondansetron, 4 mg, Intravenous, Q8H PRN  oxyCODONE, 2.5 mg, Oral, Q6H PRN   Or  oxyCODONE, 5 mg, Oral, Q6H PRN        IP CONSULT TO GASTROENTEROLOGY    Network Utilization Review Department  ATTENTION: Please call with any questions or concerns to [phone] and carefully listen to the prompts so that you are directed to the right person. All voicemails are confidential.   For Discharge needs, contact Care Management DC Support Team at [phone] opt. 2  Send all requests for admission clinical reviews, approved or denied determinations and any other requests to dedicated fax number below belonging to the campus where the patient is receiving treatment. List of dedicated fax numbers for the Facilities:  FACILITY NAME UR FAX NUMBER   ADMISSION DENIALS (Administrative/Medical Necessity) [phone]   DISCHARGE SUPPORT TEAM (NETWORK) [phone]   PARENT CHILD HEALTH (Maternity/NICU/Pediatrics) [phone]   [facility] [phone]   [facility] [phone]   Critical access hospital  [facility] [phone]   [facility] [phone]   [facility] [phone]   [facility] [phone]   [facility] [phone]   [facility] [phone]   [facility] [phone]   [facility] [phone]   [facility] [phone]   [facility] [phone]

## 2024-06-12 NOTE — CASE MANAGEMENT
Case Management Assessment & Discharge Planning Note    Patient name Radha Saini  Location /- MRN [id number]  : 1950 Date 2024       Current Admission Date: 2024  Current Admission Diagnosis:Colitis   Patient Active Problem List    Diagnosis Date Noted Date Diagnosed    Colitis 2024     Bloody diarrhea 2024     Depression with anxiety 2023     Vitamin D deficiency 2023     Dissection of carotid artery (HCC) 2023     Thyroid nodule 2022     Neck pain 2022     Anxiety      Dental abscess 2021     Essential hypertension 2021     Tympanic membrane perforation, left 2021     Mixed conductive and sensorineural hearing loss of left ear with restricted hearing of right ear 2021     Depression, recurrent (HCC) 2020     Overweight (BMI 25.0-29.9) 2020     Impaired fasting glucose 2020     Acquired hypothyroidism 2020     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       Advance Directives  Does patient have a Health Care POA?: Yes  Does patient have Advance Directives?: Yes  Advance Directives: Living will, Power of  for health care  Primary Contact: Shar ZARAGOZA         Readmission Root Cause  30 Day Readmission: No    Patient Information  Admitted from:: Home  Mental Status: Alert  During  Assessment patient was accompanied by: Not accompanied during assessment  Assessment information provided by:: Patient  Primary Caregiver: Self  Support Systems: Spouse/significant other  County of Residence: [place]  What city do you live in?: [place]  Home entry access options. Select all that apply.: Stairs  Number of steps to enter home.: 1  Do the steps have railings?: Yes  Type of Current Residence: Other (Comment) (1 story home)  Living Arrangements: Lives w/ Spouse/significant other  Is patient a ?: No    Activities of Daily Living Prior to Admission  Functional Status: Independent  Completes ADLs independently?: Yes  Ambulates independently?: Yes  Does patient use assisted devices?: No  Does patient currently own DME?: No  Does patient have a history of Outpatient Therapy (PT/OT)?: No  Does the patient have a history of Short-Term Rehab?: No  Does patient have a history of HHC?: No  Does patient currently have HHC?: No         Patient Information Continued  Income Source: Pension/shelter  Does patient have prescription coverage?: Yes  Does patient receive dialysis treatments?: No  Does patient have a history of substance abuse?: No  Does patient have a history of Mental Health Diagnosis?: No         Means of Transportation  Means of Transport to Appts:: Drives Self      Social Determinants of Health (SDOH)      Flowsheet Row Most Recent Value   Housing Stability    In the last 12 months, was there a time when you were not able to pay the mortgage or rent on time? N   In the past 12 months, how many times have you moved where you were living? 1   At any time in the past 12 months, were you homeless or living in a shelter (including now)? N   Transportation Needs    In the past 12 months, has lack of transportation kept you from medical appointments or from getting medications? no   In the past 12 months, has lack of transportation kept you from meetings, work, or from getting things needed  for daily living? No   Food Insecurity    Within the past 12 months, you worried that your food would run out before you got the money to buy more. Never true   Within the past 12 months, the food you bought just didn't last and you didn't have money to get more. Never true   Utilities    In the past 12 months has the electric, gas, oil, or water company threatened to shut off services in your home? No            DISCHARGE DETAILS:    Discharge planning discussed with:: patient  Freedom of Choice: Yes                   Contacts  Patient Contacts: Shar Tamez/Significant other  Relationship to Patient:: Family  Contact Method: Phone  Phone Number: [phone]  Reason/Outcome: Discharge Planning    Requested Home Health Care         Is the patient interested in HHC at discharge?: No    DME Referral Provided  Referral made for DME?: No         Would you like to participate in our [facility] service program?  : No - Declined    Treatment Team Recommendation: Home  Discharge Destination Plan:: Home  Transport at Discharge : Other (Comment) (SO)           Additional Comments: Chart reviewed for discharge planning. Pt has no identified CM discharge needs at this itme. She will return home with SO on discharge. CM to follow.

## 2024-06-12 NOTE — PROGRESS NOTES
[facility]  Progress Note  Name: Radha Saini I  MRN: [id number]  Unit/Bed#: -01 I Date of Admission: 6/11/2024   Date of Service: 6/12/2024 I Hospital Day: 1    Assessment & Plan   Acquired hypothyroidism  Assessment & Plan  Continue levothyroxine    * Colitis  Assessment & Plan  Patient reports acute onset of nausea vomiting and diarrhea 3 hours after eating yesterday  Also reports for the past week she has been taking ibuprofen several times a day after a dental procedure  She is having epigastric pain and pain in her left upper and right upper quadrants  She reports an episode of bloody diarrhea.  Will obtain stool studies, started on antibiotics with ongoing bloody diarrhea  Suspicion for infectious colitis, also possible peptic ulcer, if not improving over the next 24 hours will discuss with GI  Also consider ischemic colitis with involvement of the splenic flexure, conservative management patient does not have signs of peritonitis, fluid hydration bowel rest      6/12/24: Patient improving since admission, abdominal pain is slightly improved, has not had episodes of bloody diarrhea this morning.  Awaiting stool studies  Will continue on Zosyn  Appreciate gi consult               VTE Pharmacologic Prophylaxis: VTE Score: 0 Low Risk (Score 0-2) - Encourage Ambulation.    Mobility:   Basic Mobility Inpatient Raw Score: 24  JH-HLM Goal: 8: Walk 250 feet or more  JH-HLM Achieved: 6: Walk 10 steps or more  JH-HLM Goal achieved. Continue to encourage appropriate mobility.    Patient Centered Rounds: I performed bedside rounds with nursing staff today.   Discussions with Specialists or Other Care Team Provider:     Education and Discussions with Family / Patient: Patient declined call to .     Total Time Spent on Date of Encounter in care of patient:  mins. This time was spent on one or more of the following: performing physical exam; counseling and coordination of care;  Guaifenesin with codeine prescribed. obtaining or reviewing history; documenting in the medical record; reviewing/ordering tests, medications or procedures; communicating with other healthcare professionals and discussing with patient's family/caregivers.    Current Length of Stay: 1 day(s)  Current Patient Status: Inpatient   Certification Statement: The patient will continue to require additional inpatient hospital stay due to stool studies, gi consult  Discharge Plan: Anticipate discharge in 24-48 hrs to home.    Code Status: Level 3 - DNAR and DNI    Subjective:   Patient reports improvement in her abdominal pain, also in the severity and quantity of her diarrhea    Objective:     Vitals:   Temp (24hrs), Av.8 °F (36.6 °C), Min:97.3 °F (36.3 °C), Max:98.1 °F (36.7 °C)    Temp:  [97.3 °F (36.3 °C)-98.1 °F (36.7 °C)] 98.1 °F (36.7 °C)  HR:  [73-86] 82  Resp:  [14-19] 18  BP: (112-149)/(62-74) 120/68  SpO2:  [92 %-98 %] 93 %  Body mass index is 25.98 kg/m².     Input and Output Summary (last 24 hours):     Intake/Output Summary (Last 24 hours) at 2024 1144  Last data filed at 2024 0900  Gross per 24 hour   Intake 1455 ml   Output --   Net 1455 ml       Physical Exam:   Physical Exam  Vitals and nursing note reviewed.   Constitutional:       General: She is not in acute distress.     Appearance: She is well-developed. She is not toxic-appearing or diaphoretic.   HENT:      Head: Normocephalic and atraumatic.   Eyes:      General: No scleral icterus.     Conjunctiva/sclera: Conjunctivae normal.   Cardiovascular:      Rate and Rhythm: Normal rate and regular rhythm.      Heart sounds: No murmur heard.     No friction rub. No gallop.   Pulmonary:      Effort: Pulmonary effort is normal. No respiratory distress.      Breath sounds: Normal breath sounds. No stridor. No wheezing, rhonchi or rales.   Chest:      Chest wall: No tenderness.   Abdominal:      General: There is no distension.      Palpations: Abdomen is soft. There is no mass.       Tenderness: There is abdominal tenderness. There is no guarding or rebound.      Hernia: No hernia is present.   Musculoskeletal:         General: No swelling or tenderness.      Cervical back: Neck supple.   Skin:     General: Skin is warm and dry.      Capillary Refill: Capillary refill takes less than 2 seconds.   Neurological:      Mental Status: She is alert and oriented to person, place, and time.   Psychiatric:         Mood and Affect: Mood normal.          Additional Data:     Labs:  Results from last 7 days   Lab Units 06/12/24  0436   WBC Thousand/uL 8.19   HEMOGLOBIN g/dL 11.9   HEMATOCRIT % 36.1   PLATELETS Thousands/uL 190   SEGS PCT % 83*   LYMPHO PCT % 9*   MONO PCT % 7   EOS PCT % 1     Results from last 7 days   Lab Units 06/12/24  0436   SODIUM mmol/L 136   POTASSIUM mmol/L 3.8   CHLORIDE mmol/L 104   CO2 mmol/L 26   BUN mg/dL 12   CREATININE mg/dL 0.86   ANION GAP mmol/L 6   CALCIUM mg/dL 8.4   ALBUMIN g/dL 3.6   TOTAL BILIRUBIN mg/dL 0.59   ALK PHOS U/L 65   ALT U/L 11   AST U/L 15   GLUCOSE RANDOM mg/dL 90                 Results from last 7 days   Lab Units 06/11/24  1121   LACTIC ACID mmol/L 0.8       Lines/Drains:  Invasive Devices       Peripheral Intravenous Line  Duration             Peripheral IV 06/11/24 Right;Ventral (anterior) Forearm 1 day                          Imaging: No pertinent imaging reviewed.    Recent Cultures (last 7 days):         Last 24 Hours Medication List:   Current Facility-Administered Medications   Medication Dose Route Frequency Provider Last Rate    acetaminophen  975 mg Oral Q8H PRN Moo Banai, DO      busPIRone  5 mg Oral BID Moo Banai, DO      FLUoxetine  60 mg Oral Daily Moo Banai, DO      fluticasone  1 spray Each Nare Daily Moo Banai, DO      HYDROmorphone  0.2 mg Intravenous Q3H PRN Moo Banai, DO      lactated ringers  100 mL/hr Intravenous Continuous Moo Banai,  mL/hr (06/12/24 0344)    levothyroxine  100 mcg Oral Early Morning  Moo Maya, DO      LORazepam  2 mg Oral BID PRN Moo Ritikaai, DO      ondansetron  4 mg Intravenous Q8H PRN Moo Banai, DO      oxyCODONE  2.5 mg Oral Q6H PRN Moo Banai, DO      Or    oxyCODONE  5 mg Oral Q6H PRN Moo Banai, DO      pantoprazole  40 mg Intravenous Q12H ARASH Moo Maya, DO      piperacillin-tazobactam  4.5 g Intravenous Q8H Moo Maya, DO 4.5 g (06/12/24 0406)        Today, Patient Was Seen By: Moo Maya DO    **Please Note: This note may have been constructed using a voice recognition system.**

## 2024-06-13 ENCOUNTER — TELEPHONE (OUTPATIENT)
Dept: GASTROENTEROLOGY | Facility: CLINIC | Age: 74
End: 2024-06-13

## 2024-06-13 VITALS
BODY MASS INDEX: 25.37 KG/M2 | OXYGEN SATURATION: 97 % | HEIGHT: 66 IN | TEMPERATURE: 97.7 F | SYSTOLIC BLOOD PRESSURE: 147 MMHG | WEIGHT: 157.85 LBS | RESPIRATION RATE: 18 BRPM | HEART RATE: 70 BPM | DIASTOLIC BLOOD PRESSURE: 73 MMHG

## 2024-06-13 LAB
ALBUMIN SERPL BCP-MCNC: 3.7 G/DL (ref 3.5–5)
ALP SERPL-CCNC: 57 U/L (ref 34–104)
ALT SERPL W P-5'-P-CCNC: 12 U/L (ref 7–52)
ANION GAP SERPL CALCULATED.3IONS-SCNC: 6 MMOL/L (ref 4–13)
AST SERPL W P-5'-P-CCNC: 17 U/L (ref 13–39)
BILIRUB SERPL-MCNC: 0.43 MG/DL (ref 0.2–1)
BUN SERPL-MCNC: 10 MG/DL (ref 5–25)
CALCIUM SERPL-MCNC: 8.6 MG/DL (ref 8.4–10.2)
CHLORIDE SERPL-SCNC: 102 MMOL/L (ref 96–108)
CO2 SERPL-SCNC: 28 MMOL/L (ref 21–32)
CREAT SERPL-MCNC: 0.99 MG/DL (ref 0.6–1.3)
ERYTHROCYTE [DISTWIDTH] IN BLOOD BY AUTOMATED COUNT: 11.6 % (ref 11.6–15.1)
GFR SERPL CREATININE-BSD FRML MDRD: 57 ML/MIN/1.73SQ M
GLUCOSE SERPL-MCNC: 133 MG/DL (ref 65–140)
HCT VFR BLD AUTO: 36.3 % (ref 34.8–46.1)
HGB BLD-MCNC: 11.8 G/DL (ref 11.5–15.4)
MCH RBC QN AUTO: 31.8 PG (ref 26.8–34.3)
MCHC RBC AUTO-ENTMCNC: 32.5 G/DL (ref 31.4–37.4)
MCV RBC AUTO: 98 FL (ref 82–98)
PLATELET # BLD AUTO: 193 THOUSANDS/UL (ref 149–390)
PMV BLD AUTO: 9.7 FL (ref 8.9–12.7)
POTASSIUM SERPL-SCNC: 3.7 MMOL/L (ref 3.5–5.3)
PROT SERPL-MCNC: 6.3 G/DL (ref 6.4–8.4)
RBC # BLD AUTO: 3.71 MILLION/UL (ref 3.81–5.12)
SODIUM SERPL-SCNC: 136 MMOL/L (ref 135–147)
WBC # BLD AUTO: 6.24 THOUSAND/UL (ref 4.31–10.16)

## 2024-06-13 PROCEDURE — 85027 COMPLETE CBC AUTOMATED: CPT | Performed by: STUDENT IN AN ORGANIZED HEALTH CARE EDUCATION/TRAINING PROGRAM

## 2024-06-13 PROCEDURE — 99239 HOSP IP/OBS DSCHRG MGMT >30: CPT | Performed by: INTERNAL MEDICINE

## 2024-06-13 PROCEDURE — 80053 COMPREHEN METABOLIC PANEL: CPT | Performed by: INTERNAL MEDICINE

## 2024-06-13 PROCEDURE — NC001 PR NO CHARGE: Performed by: STUDENT IN AN ORGANIZED HEALTH CARE EDUCATION/TRAINING PROGRAM

## 2024-06-13 RX ORDER — CEFTRIAXONE 1 G/50ML
1000 INJECTION, SOLUTION INTRAVENOUS EVERY 24 HOURS
Status: DISCONTINUED | OUTPATIENT
Start: 2024-06-13 | End: 2024-06-13 | Stop reason: HOSPADM

## 2024-06-13 RX ORDER — OXYCODONE HYDROCHLORIDE 5 MG/1
2.5 TABLET ORAL EVERY 6 HOURS PRN
Qty: 5 TABLET | Refills: 0 | Status: SHIPPED | OUTPATIENT
Start: 2024-06-13 | End: 2024-06-18

## 2024-06-13 RX ORDER — PANTOPRAZOLE SODIUM 40 MG/1
40 TABLET, DELAYED RELEASE ORAL DAILY
Qty: 30 TABLET | Refills: 0 | Status: SHIPPED | OUTPATIENT
Start: 2024-06-13 | End: 2024-06-24 | Stop reason: SDUPTHER

## 2024-06-13 RX ORDER — PANTOPRAZOLE SODIUM 40 MG/1
40 TABLET, DELAYED RELEASE ORAL
Status: DISCONTINUED | OUTPATIENT
Start: 2024-06-13 | End: 2024-06-13 | Stop reason: HOSPADM

## 2024-06-13 RX ADMIN — FLUOXETINE HYDROCHLORIDE 60 MG: 20 CAPSULE ORAL at 09:08

## 2024-06-13 RX ADMIN — LEVOTHYROXINE SODIUM 100 MCG: 0.1 TABLET ORAL at 06:12

## 2024-06-13 RX ADMIN — FLUTICASONE PROPIONATE 1 SPRAY: 50 SPRAY, METERED NASAL at 09:08

## 2024-06-13 RX ADMIN — ACETAMINOPHEN 975 MG: 325 TABLET ORAL at 10:49

## 2024-06-13 RX ADMIN — PANTOPRAZOLE SODIUM 40 MG: 40 TABLET, DELAYED RELEASE ORAL at 07:57

## 2024-06-13 RX ADMIN — CEFTRIAXONE 1000 MG: 1 INJECTION, SOLUTION INTRAVENOUS at 09:49

## 2024-06-13 RX ADMIN — BUSPIRONE HYDROCHLORIDE 5 MG: 5 TABLET ORAL at 09:08

## 2024-06-13 RX ADMIN — PIPERACILLIN AND TAZOBACTAM 4.5 G: 4; .5 INJECTION, POWDER, LYOPHILIZED, FOR SOLUTION INTRAVENOUS at 04:09

## 2024-06-13 NOTE — TELEPHONE ENCOUNTER
----- Message from Polina Sosa DO sent at 6/13/2024  3:02 PM EDT -----  Regarding: hospital follow up  Good afternoon,    Can you please call patient to schedule follow up visit after recent hospitalization to discuss pursuing possible colonoscopy for further evaluation of CT findings at time of admission.    Thanks,  Zachary

## 2024-06-13 NOTE — DISCHARGE SUMMARY
Formerly [facility]  Discharge- Radha Saini 1950, 73 y.o. female MRN: [id number]  Unit/Bed#: [facility] Encounter: [id number]  Primary Care Provider: MARY Gastelum   Date and time admitted to hospital: 6/11/2024  6:11 AM    Acquired hypothyroidism  Assessment & Plan  Continue levothyroxine    * Colitis  Assessment & Plan  Patient reports acute onset of nausea vomiting and diarrhea 3 hours after eating yesterday  Also reports for the past week she has been taking ibuprofen several times a day after a dental procedure  She is having epigastric pain and pain in her left upper and right upper quadrants  She reports an episode of bloody diarrhea.  Will obtain stool studies, started on antibiotics with ongoing bloody diarrhea  Suspicion for infectious colitis, also possible peptic ulcer, if not improving over the next 24 hours will discuss with GI  Also consider ischemic colitis with involvement of the splenic flexure, conservative management patient does not have signs of peritonitis, fluid hydration bowel rest      6/12/24: Patient improving since admission, abdominal pain is slightly improved, has not had episodes of bloody diarrhea this morning.  Awaiting stool studies  Will continue on Zosyn  Appreciate gi consult      6/13/24: Patient with normal bowel movement today, abdominal pain significantly improved  Stool studies reviewed all negative  Discontinue antibiotics  GI follow-up as an outpatient for colonoscopy        Medical Problems       Resolved Problems  Date Reviewed: 6/13/2024   None       Discharging Physician / Practitioner: Moo Maya DO  PCP: MARY Gastelum  Admission Date:   Admission Orders (From admission, onward)       Ordered        06/11/24 0811  INPATIENT ADMISSION  Once                          Discharge Date: 06/13/24        Reason for Admission: abd pain, hematochezia    Hospital Course:   Radha Saini is a 73 y.o. female patient who originally presented to the  [facility] on 6/11/2024 due to abdominal pain nausea bloody diarrhea, which developed acutely several hours after eating macaroni salad.  Patient was noted to have sequential thickening of the splenic flexure into the descending colon with concern for inflammatory versus ischemic colitis.  She had several episodes of bloody diarrhea however her hemoglobin dropped slightly from 13.7 to around 12 which is within her baseline.  Patient was given antibiotics for several days with improvement in her symptoms as well as with bowel rest.  Patient was cleared for discharge by GI with outpatient follow-up for colonoscopy as an outpatient            Please see above list of diagnoses and related plan for additional information.     Condition at Discharge: stable    Discharge Day Visit / Exam:   Subjective: Patient denies any acute complaints  Vitals: Blood Pressure: 147/73 (06/13/24 0715)  Pulse: 70 (06/13/24 0715)  Temperature: 97.7 °F (36.5 °C) (06/13/24 0715)  Temp Source: Oral (06/13/24 0715)  Respirations: 18 (06/13/24 0715)  Height: 5' 6\" (167.6 cm) (06/11/24 1221)  Weight - Scale: 71.6 kg (157 lb 13.6 oz) (06/13/24 0600)  SpO2: 97 % (06/13/24 0715)  Exam:   Physical Exam  Vitals and nursing note reviewed.   Constitutional:       General: She is not in acute distress.     Appearance: She is well-developed. She is not toxic-appearing or diaphoretic.   HENT:      Head: Normocephalic and atraumatic.   Eyes:      General: No scleral icterus.     Conjunctiva/sclera: Conjunctivae normal.   Cardiovascular:      Rate and Rhythm: Normal rate and regular rhythm.      Heart sounds: No murmur heard.     No friction rub. No gallop.   Pulmonary:      Effort: Pulmonary effort is normal. No respiratory distress.      Breath sounds: Normal breath sounds. No stridor. No wheezing, rhonchi or rales.   Chest:      Chest wall: No tenderness.   Abdominal:      General: There is no distension.      Palpations: Abdomen is soft. There is no mass. "      Tenderness: There is no abdominal tenderness. There is no guarding or rebound.      Hernia: No hernia is present.   Musculoskeletal:         General: No swelling or tenderness.      Cervical back: Neck supple.   Skin:     General: Skin is warm and dry.      Capillary Refill: Capillary refill takes less than 2 seconds.   Neurological:      Mental Status: She is alert and oriented to person, place, and time.   Psychiatric:         Mood and Affect: Mood normal.          Discussion with Family: Patient declined call to .     Discharge instructions/Information to patient and family:   See after visit summary for information provided to patient and family.      Provisions for Follow-Up Care:  See after visit summary for information related to follow-up care and any pertinent home health orders.      Mobility at time of Discharge:   Basic Mobility Inpatient Raw Score: 24  JH-HLM Goal: 8: Walk 250 feet or more  JH-HLM Achieved: 7: Walk 25 feet or more  HLM Goal achieved. Continue to encourage appropriate mobility.     Disposition:   Home    Planned Readmission: no     Discharge Statement:  I spent  minutes discharging the patient. This time was spent on the day of discharge. I had direct contact with the patient on the day of discharge. Greater than 50% of the total time was spent examining patient, answering all patient questions, arranging and discussing plan of care with patient as well as directly providing post-discharge instructions.  Additional time then spent on discharge activities.    Discharge Medications:  See after visit summary for reconciled discharge medications provided to patient and/or family.      **Please Note: This note may have been constructed using a voice recognition system**

## 2024-06-13 NOTE — ASSESSMENT & PLAN NOTE
Patient reports acute onset of nausea vomiting and diarrhea 3 hours after eating yesterday  Also reports for the past week she has been taking ibuprofen several times a day after a dental procedure  She is having epigastric pain and pain in her left upper and right upper quadrants  She reports an episode of bloody diarrhea.  Will obtain stool studies, started on antibiotics with ongoing bloody diarrhea  Suspicion for infectious colitis, also possible peptic ulcer, if not improving over the next 24 hours will discuss with GI  Also consider ischemic colitis with involvement of the splenic flexure, conservative management patient does not have signs of peritonitis, fluid hydration bowel rest      6/12/24: Patient improving since admission, abdominal pain is slightly improved, has not had episodes of bloody diarrhea this morning.  Awaiting stool studies  Will continue on Zosyn  Appreciate gi consult      6/13/24: Patient with normal bowel movement today, abdominal pain significantly improved  Stool studies reviewed all negative  Discontinue antibiotics  GI follow-up as an outpatient for colonoscopy

## 2024-06-13 NOTE — NURSING NOTE
Pt DC to home via sister in stable condition. All belongings packed by pt. IV catheter removed fully intact. AVS reviewed with pt face-to-face, questions answered about added medications and follow up appointments.

## 2024-06-13 NOTE — PLAN OF CARE
Problem: Potential for Falls  Goal: Patient will remain free of falls  Description: INTERVENTIONS:  - Educate patient/family on patient safety including physical limitations  - Instruct patient to call for assistance with activity   - Consult OT/PT to assist with strengthening/mobility   - Keep Call bell within reach  - Keep bed low and locked with side rails adjusted as appropriate  - Keep care items and personal belongings within reach  - Initiate and maintain comfort rounds  - Make Fall Risk Sign visible to staff  - Offer Toileting every  Hours, in advance of need  - Initiate/Maintain alarm  - Obtain necessary fall risk management equipment:   - Apply yellow socks and bracelet for high fall risk patients  - Consider moving patient to room near nurses station  Outcome: Adequate for Discharge     Problem: PAIN - ADULT  Goal: Verbalizes/displays adequate comfort level or baseline comfort level  Description: Interventions:  - Encourage patient to monitor pain and request assistance  - Assess pain using appropriate pain scale  - Administer analgesics based on type and severity of pain and evaluate response  - Implement non-pharmacological measures as appropriate and evaluate response  - Consider cultural and social influences on pain and pain management  - Notify physician/advanced practitioner if interventions unsuccessful or patient reports new pain  Outcome: Adequate for Discharge     Problem: INFECTION - ADULT  Goal: Absence or prevention of progression during hospitalization  Description: INTERVENTIONS:  - Assess and monitor for signs and symptoms of infection  - Monitor lab/diagnostic results  - Monitor all insertion sites, i.e. indwelling lines, tubes, and drains  - Monitor endotracheal if appropriate and nasal secretions for changes in amount and color  - [place] appropriate cooling/warming therapies per order  - Administer medications as ordered  - Instruct and encourage patient and family to use good  hand hygiene technique  - Identify and instruct in appropriate isolation precautions for identified infection/condition  Outcome: Adequate for Discharge     Problem: SAFETY ADULT  Goal: Patient will remain free of falls  Description: INTERVENTIONS:  - Educate patient/family on patient safety including physical limitations  - Instruct patient to call for assistance with activity   - Consult OT/PT to assist with strengthening/mobility   - Keep Call bell within reach  - Keep bed low and locked with side rails adjusted as appropriate  - Keep care items and personal belongings within reach  - Initiate and maintain comfort rounds  - Make Fall Risk Sign visible to staff  - Offer Toileting every  Hours, in advance of need  - Initiate/Maintain alarm  - Obtain necessary fall risk management equipment:  - Apply yellow socks and bracelet for high fall risk patients  - Consider moving patient to room near nurses station  Outcome: Adequate for Discharge  Goal: Maintain or return to baseline ADL function  Description: INTERVENTIONS:  -  Assess patient's ability to carry out ADLs; assess patient's baseline for ADL function and identify physical deficits which impact ability to perform ADLs (bathing, care of mouth/teeth, toileting, grooming, dressing, etc.)  - Assess/evaluate cause of self-care deficits   - Assess range of motion  - Assess patient's mobility; develop plan if impaired  - Assess patient's need for assistive devices and provide as appropriate  - Encourage maximum independence but intervene and supervise when necessary  - Involve family in performance of ADLs  - Assess for home care needs following discharge   - Consider OT consult to assist with ADL evaluation and planning for discharge  - Provide patient education as appropriate  Outcome: Adequate for Discharge  Goal: Maintains/Returns to pre admission functional level  Description: INTERVENTIONS:  - Perform AM-PAC 6 Click Basic Mobility/ Daily Activity assessment  daily.  - Set and communicate daily mobility goal to care team and patient/family/caregiver.   - Collaborate with rehabilitation services on mobility goals if consulted  - Out of bed for meals 3 times a day  - Out of bed for toileting  - Record patient progress and toleration of activity level   Outcome: Adequate for Discharge     Problem: DISCHARGE PLANNING  Goal: Discharge to home or other facility with appropriate resources  Description: INTERVENTIONS:  - Identify barriers to discharge w/patient and caregiver  - Arrange for needed discharge resources and transportation as appropriate  - Identify discharge learning needs (meds, wound care, etc.)  - Arrange for interpretive services to assist at discharge as needed  - Refer to Case Management Department for coordinating discharge planning if the patient needs post-hospital services based on physician/advanced practitioner order or complex needs related to functional status, cognitive ability, or social support system  Outcome: Adequate for Discharge     Problem: Knowledge Deficit  Goal: Patient/family/caregiver demonstrates understanding of disease process, treatment plan, medications, and discharge instructions  Description: Complete learning assessment and assess knowledge base.  Interventions:  - Provide teaching at level of understanding  - Provide teaching via preferred learning methods  Outcome: Adequate for Discharge     Problem: GASTROINTESTINAL - ADULT  Goal: Minimal or absence of nausea and/or vomiting  Description: INTERVENTIONS:  - Administer IV fluids if ordered to ensure adequate hydration  - Maintain NPO status until nausea and vomiting are resolved  - Nasogastric tube if ordered  - Administer ordered antiemetic medications as needed  - Provide nonpharmacologic comfort measures as appropriate  - Advance diet as tolerated, if ordered  - Consider nutrition services referral to assist patient with adequate nutrition and appropriate food  choices  Outcome: Adequate for Discharge  Goal: Maintains adequate nutritional intake  Description: INTERVENTIONS:  - Monitor percentage of each meal consumed  - Identify factors contributing to decreased intake, treat as appropriate  - Assist with meals as needed  - Monitor I&O, weight, and lab values if indicated  - Obtain nutrition services referral as needed  Outcome: Adequate for Discharge     Problem: METABOLIC, FLUID AND ELECTROLYTES - ADULT  Goal: Electrolytes maintained within normal limits  Description: INTERVENTIONS:  - Monitor labs and assess patient for signs and symptoms of electrolyte imbalances  - Administer electrolyte replacement as ordered  - Monitor response to electrolyte replacements, including repeat lab results as appropriate  - Instruct patient on fluid and nutrition as appropriate  Outcome: Adequate for Discharge  Goal: Fluid balance maintained  Description: INTERVENTIONS:  - Monitor labs   - Monitor I/O and WT  - Instruct patient on fluid and nutrition as appropriate  - Assess for signs & symptoms of volume excess or deficit  Outcome: Adequate for Discharge     Problem: HEMATOLOGIC - ADULT  Goal: Maintains hematologic stability  Description: INTERVENTIONS  - Assess for signs and symptoms of bleeding or hemorrhage  - Monitor labs  - Administer supportive blood products/factors as ordered and appropriate  Outcome: Adequate for Discharge

## 2024-06-13 NOTE — PROGRESS NOTES
[facility] - Inpatient Progress Note    PATIENT INFORMATION      Radha Saini 73 y.o. female MRN: [id number]  Unit/Bed#: -01 Encounter: [id number]    ASSESSMENT & PLAN   Radha Saini is a 73 y.o. female with PMH significant for anxiety/depression, hearing loss, hypertension, hx SCC cancer of the tonsils (2008) with prior PEG tube placement who presented to [facility] on 6/11 for concern of abdominal pain, N/V/D for one day with development of BRBPR in ED and CT scan with splenic flexure/descending colon colitis. GI consulted for further evaluation     Hematochezia  Colitis  Diarrhea  Nausea and Vomiting  Patient developed N/V/D 3 hours after eating store-bought macaroni salad. Symptoms progressively worsened leading to presentation to ED. While in ED, had 2 episodes of hematochezia. CT scan done for evaluation significant for circumferential thickening of the splenic flexure of the colon extending to the descending colon with minimal adjacent fat stranding concerning for infectious versus inflammatory versus ischemic colitis. Hgb has downtrended since presentation 13.7>12.9>11.9 with baseline 12-13, and all cell lines have decreased with IVF resuscitation. Blood in bowel movements has decreased, and patient reports no further bowel movements since early this morning. N/V have resolved, and currently tolerating clear liquids. LUQ abdominal pain persists, but overall is feeling better  Overall picture concerning for ischemic vs. Infectious colitis - however, with progression from diarrhea to hematochezia as well as distribution of colitis on CT raises overall concern for ischemia  Patient does have recent history of NSAID use with some epigastric tenderness - however, given stable labs and relatively stable labs low concern for UGI source of bleeding  Patient feeling significantly improved - tolerating PO diet, no further BRBPR. Does endorse gas and gurgling stomach - advised normal post  infection  Discharge on daily PPI  Continue conservative management and avoid hypotension  Can d/c antibiotic from GI perspective  Will arrange outpatient follow up for discussion of colonoscopy to evaluate for area of colitis with rectal bleeding noted on CT scan to r/o underlying lesion      SUBJECTIVE     Patient seen and evaluated at bedside. Feeling significantly better. Pain improved. Tolerating PO diet. No further blood in BM    MEDICATIONS & ALLERGIES       Medications:     Medications Prior to Admission:     ascorbic acid (VITAMIN C) 1000 MG tablet    aspirin (Aspirin 81) 81 mg EC tablet    busPIRone (BUSPAR) 10 mg tablet    FLUoxetine (PROzac) 60 MG TABS    fluticasone (FLONASE) 50 mcg/act nasal spray    levothyroxine 100 mcg tablet    lisinopril (ZESTRIL) 20 mg tablet    LORazepam (ATIVAN) 2 mg tablet    Melatonin 10 MG TABS    Multiple Vitamin (MULTIVITAMIN) tablet    ofloxacin (FLOXIN) 0.3 % otic solution    TURMERIC PO    vitamin B-12 (VITAMIN B-12) 500 mcg tablet    Vitamin D, Cholecalciferol, 50 MCG (2000 UT) CAPS    acetaminophen (TYLENOL) 325 mg tablet    ibuprofen (MOTRIN) 400 mg tablet    tiZANidine (ZANAFLEX) 2 mg tablet  Current Facility-Administered Medications   Medication Dose Route Frequency    acetaminophen (TYLENOL) tablet 975 mg  975 mg Oral Q8H PRN    busPIRone (BUSPAR) tablet 5 mg  5 mg Oral BID    cefTRIAXone (ROCEPHIN) IVPB (premix in dextrose) 1,000 mg 50 mL  1,000 mg Intravenous Q24H    FLUoxetine (PROzac) capsule 60 mg  60 mg Oral Daily    fluticasone (FLONASE) 50 mcg/act nasal spray 1 spray  1 spray Each Nare Daily    HYDROmorphone HCl (DILAUDID) injection 0.2 mg  0.2 mg Intravenous Q3H PRN    levothyroxine tablet 100 mcg  100 mcg Oral Early Morning    LORazepam (ATIVAN) tablet 2 mg  2 mg Oral BID PRN    ondansetron (ZOFRAN) injection 4 mg  4 mg Intravenous Q8H PRN    oxyCODONE (ROXICODONE) split tablet 2.5 mg  2.5 mg Oral Q6H PRN    Or    oxyCODONE (ROXICODONE) IR tablet 5 mg  5  "mg Oral Q6H PRN    pantoprazole (PROTONIX) EC tablet 40 mg  40 mg Oral BID AC       Allergies:   No Known Allergies    PHYSICAL EXAM     Objective   Blood pressure 147/73, pulse 70, temperature 97.7 °F (36.5 °C), temperature source Oral, resp. rate 18, height 5' 6\" (1.676 m), weight 71.6 kg (157 lb 13.6 oz), SpO2 97%. Body mass index is 25.48 kg/m².    Intake/Output Summary (Last 24 hours) at 6/13/2024 0932  Last data filed at 6/13/2024 0900  Gross per 24 hour   Intake 1060 ml   Output --   Net 1060 ml       General Appearance:   Alert, cooperative, no distress   HEENT:   Normocephalic, atraumatic, anicteric     Neck:   Supple, symmetrical, trachea midline   Lungs:   Equal chest rise, respirations unlabored    Heart:   Regular rate and rhythm   Abdomen:   Soft, LUQ tenderness - improved, non-distended; normal bowel sounds; no masses, no organomegaly    Rectal:   Deferred    Extremities:   No cyanosis, clubbing or edema    Neuro:   Moves all 4 extremities    Skin:   No jaundice, rashes, or lesions      ADDITIONAL DATA     Lab Results:     Results from last 7 days   Lab Units 06/12/24  0436   WBC Thousand/uL 8.19   HEMOGLOBIN g/dL 11.9   HEMATOCRIT % 36.1   PLATELETS Thousands/uL 190   SEGS PCT % 83*   LYMPHO PCT % 9*   MONO PCT % 7   EOS PCT % 1     Results from last 7 days   Lab Units 06/12/24  0436   POTASSIUM mmol/L 3.8   CHLORIDE mmol/L 104   CO2 mmol/L 26   BUN mg/dL 12   CREATININE mg/dL 0.86   CALCIUM mg/dL 8.4   ALK PHOS U/L 65   ALT U/L 11   AST U/L 15           Imaging:    CT abdomen pelvis with contrast    Result Date: 6/11/2024  Narrative: CT ABDOMEN AND PELVIS WITH IV CONTRAST INDICATION: abd pain, nausea, vomiting, bloody diarrhea. r/o diverticulitis/colitis. COMPARISON: None. TECHNIQUE: CT examination of the abdomen and pelvis was performed. Multiplanar 2D reformatted images were created from the source data. This examination, like all CT scans performed in the [facility], was " performed utilizing techniques to minimize radiation dose exposure, including the use of iterative reconstruction and automated exposure control. Radiation dose length product (DLP) for this visit: 662.52 mGy-cm IV Contrast: 100 mL of iohexol (OMNIPAQUE) 350 Enteric Contrast: Not administered. FINDINGS: ABDOMEN LOWER CHEST: No clinically significant abnormality in the visualized lower chest. LIVER/BILIARY TREE: Unremarkable. GALLBLADDER: No calcified gallstones. No pericholecystic inflammatory change. SPLEEN: Unremarkable. PANCREAS: Diffuse atrophy of the pancreas. No acute findings. ADRENAL GLANDS: Unremarkable. KIDNEYS/URETERS: Unremarkable. No hydronephrosis. STOMACH AND BOWEL: Circumferential thickening of the splenic flexure of the colon extending through the descending colon with minimal adjacent fat stranding. No pneumatosis coli. Nondistended stomach. Unremarkable small bowel. APPENDIX: Noninflamed appendix. ABDOMINOPELVIC CAVITY: Trace free fluid in the left paracolic gutter. No abscess. No pneumoperitoneum. No lymphadenopathy. VESSELS: Mild atherosclerotic disease. No abdominal aortic aneurysm. Prominent bilateral adnexal and ovarian veins, left greater than right. This may be sequela of chronic pelvic vessel congestion syndrome or can be seen as a normal variant in asymptomatic patients. PELVIS REPRODUCTIVE ORGANS: Retroverted uterus with mildly lobulated anterior fundus suggestive of small underlying fibroid. 2 cm simple appearing left ovarian cyst for which no specific follow-up is warranted per current guidelines. URINARY BLADDER: Unremarkable. ABDOMINAL WALL/INGUINAL REGIONS: Minuscule fat-containing umbilical hernia. BONES: No acute fracture or osseous destructive lesion identified. Mild chronic appearing compression fracture of the superior endplate of L3 accentuated by prominent Schmorl's node. Degenerative changes of the spine, pubic symphysis, and multiple joints.     Impression: Acute  inflammatory or infectious uncomplicated segmental colitis involving the splenic flexure through the descending colon. Additional chronic findings and negatives as above. The study was marked in EPIC for immediate notification. Workstation performed: [id number]       EKG, Pathology, and Other Studies Reviewed on Admission:   EKG: Reviewed      Counseling / Coordination of Care Time: 30 total mins spent n consult. Greater than 50% of total time spent on patient counseling and coordination of care.    Polina Sosa DO  Gastroenterology Fellow  [facility]  Division of Gastroenterology and Hepatology  Available on [employer]  ...............................................................................................................................................  ** Please Note: This note is constructed using a voice recognition dictation system. **

## 2024-06-13 NOTE — PLAN OF CARE
Problem: PAIN - ADULT  Goal: Verbalizes/displays adequate comfort level or baseline comfort level  Description: Interventions:  - Encourage patient to monitor pain and request assistance  - Assess pain using appropriate pain scale  - Administer analgesics based on type and severity of pain and evaluate response  - Implement non-pharmacological measures as appropriate and evaluate response  - Consider cultural and social influences on pain and pain management  - Notify physician/advanced practitioner if interventions unsuccessful or patient reports new pain  Outcome: Progressing     Problem: Potential for Falls  Goal: Patient will remain free of falls  Description: INTERVENTIONS:  - Educate patient/family on patient safety including physical limitations  - Instruct patient to call for assistance with activity   - Consult OT/PT to assist with strengthening/mobility   - Keep Call bell within reach  - Keep bed low and locked with side rails adjusted as appropriate  - Keep care items and personal belongings within reach  - Initiate and maintain comfort rounds  - Make Fall Risk Sign visible to staff  - Offer Toileting every 2 Hours, in advance of need  - Apply yellow socks and bracelet for high fall risk patients  - Consider moving patient to room near nurses station  Outcome: Progressing      Humira Counseling:  I discussed with the patient the risks of adalimumab including but not limited to myelosuppression, immunosuppression, autoimmune hepatitis, demyelinating diseases, lymphoma, and serious infections.  The patient understands that monitoring is required including a PPD at baseline and must alert us or the primary physician if symptoms of infection or other concerning signs are noted.

## 2024-06-17 ENCOUNTER — TRANSITIONAL CARE MANAGEMENT (OUTPATIENT)
Dept: FAMILY MEDICINE CLINIC | Facility: CLINIC | Age: 74
End: 2024-06-17

## 2024-06-19 ENCOUNTER — TELEPHONE (OUTPATIENT)
Dept: ADMINISTRATIVE | Facility: OTHER | Age: 74
End: 2024-06-19

## 2024-06-19 NOTE — TELEPHONE ENCOUNTER
06/19/24 11:13 AM    Patient contacted to bring Advance Directive, POLST, or Living Will document to next scheduled pcp visit.[facility] Department left message.    Thank you.  Latesha Allen  [facility]

## 2024-06-24 ENCOUNTER — OFFICE VISIT (OUTPATIENT)
Dept: FAMILY MEDICINE CLINIC | Facility: CLINIC | Age: 74
End: 2024-06-24
Payer: COMMERCIAL

## 2024-06-24 VITALS
RESPIRATION RATE: 18 BRPM | WEIGHT: 150 LBS | HEIGHT: 68 IN | HEART RATE: 63 BPM | BODY MASS INDEX: 22.73 KG/M2 | TEMPERATURE: 97.6 F | DIASTOLIC BLOOD PRESSURE: 62 MMHG | OXYGEN SATURATION: 96 % | SYSTOLIC BLOOD PRESSURE: 130 MMHG

## 2024-06-24 DIAGNOSIS — E03.9 ACQUIRED HYPOTHYROIDISM: ICD-10-CM

## 2024-06-24 DIAGNOSIS — I10 ESSENTIAL HYPERTENSION: ICD-10-CM

## 2024-06-24 DIAGNOSIS — K52.9 COLITIS: Primary | ICD-10-CM

## 2024-06-24 PROCEDURE — 99495 TRANSJ CARE MGMT MOD F2F 14D: CPT | Performed by: NURSE PRACTITIONER

## 2024-06-24 RX ORDER — PANTOPRAZOLE SODIUM 40 MG/1
40 TABLET, DELAYED RELEASE ORAL DAILY
Qty: 90 TABLET | Refills: 3 | Status: SHIPPED | OUTPATIENT
Start: 2024-06-24

## 2024-06-24 NOTE — PROGRESS NOTES
Transition of Care Visit  Name: Radha Saini      : 1950      MRN: [id number]  Encounter Provider: MARY Gastelum  Encounter Date: 2024   Encounter department: [facility] PRIMARY CARE    Assessment & Plan   1. Colitis  -     pantoprazole (PROTONIX) 40 mg tablet; Take 1 tablet (40 mg total) by mouth daily  2. Essential hypertension  3. Acquired hypothyroidism      Depression Screening and Follow-up Plan: Patient was screened for depression during today's encounter. They screened negative with a PHQ-9 score of 0.        History of Present Illness     Transitional Care Management Review:   Radha Saini is a 73 y.o. female here for TCM follow up.     During the TCM phone call patient stated:  TCM Call       Date and time call was made  2024 12:58 PM    Hospital care reviewed  Records reviewed    Patient was hospitialized at  [facility]    Date of Admission  24    Date of discharge  24    Diagnosis  colitis    Disposition  Home    Current Symptoms  --  still has some diarrhea, no cramping or blood  getting better    Weakness severity  Moderate    Fatigue severity  Moderate          TCM Call       Post hospital issues  None    Scheduled for follow up?  Yes    Did you obtain your prescribed medications  Yes    Do you need help managing your prescriptions or medications  No    Is transportation to your appointment needed  No    Specify why  unable to drive at this time    I have advised the patient to call PCP with any new or worsening symptoms  Carmen Jimenez MA    Living Arrangements  Friends    Support System  Family; Friends    The type of support provided  Emotional; Other (comment); Physical    Do you have social support  Yes, as much as I need          Date and time admitted to hospital: 2024  6:11 AM     Acquired hypothyroidism  Assessment & Plan  Continue levothyroxine     * Colitis  Assessment & Plan  Patient reports acute onset of nausea vomiting and diarrhea 3  hours after eating yesterday  Also reports for the past week she has been taking ibuprofen several times a day after a dental procedure  She is having epigastric pain and pain in her left upper and right upper quadrants  She reports an episode of bloody diarrhea.  Will obtain stool studies, started on antibiotics with ongoing bloody diarrhea  Suspicion for infectious colitis, also possible peptic ulcer, if not improving over the next 24 hours will discuss with GI  Also consider ischemic colitis with involvement of the splenic flexure, conservative management patient does not have signs of peritonitis, fluid hydration bowel rest        6/12/24: Patient improving since admission, abdominal pain is slightly improved, has not had episodes of bloody diarrhea this morning.  Awaiting stool studies  Will continue on Zosyn  Appreciate gi consult        6/13/24: Patient with normal bowel movement today, abdominal pain significantly improved  Stool studies reviewed all negative  Discontinue antibiotics  GI follow-up as an outpatient for colonoscopy           Medical Problems          Resolved Problems  Date Reviewed: 6/13/2024  None        Discharging Physician / Practitioner: Moo Maya DO  PCP: MARY Gastelum  Admission Date:   Admission Orders (From admission, onward)           Ordered        06/11/24 0811     INPATIENT ADMISSION  Once                            Discharge Date: 06/13/24           Reason for Admission: abd pain, hematochezia     Hospital Course:   Radha Saini is a 73 y.o. female patient who originally presented to the hospital on 6/11/2024 due to abdominal pain nausea bloody diarrhea, which developed acutely several hours after eating macaroni salad.  Patient was noted to have sequential thickening of the splenic flexure into the descending colon with concern for inflammatory versus ischemic colitis.  She had several episodes of bloody diarrhea however her hemoglobin dropped slightly from 13.7 to  "around 12 which is within her baseline.  Patient was given antibiotics for several days with improvement in her symptoms as well as with bowel rest.  Patient was cleared for discharge by GI with outpatient follow-up for colonoscopy as an outpatient           Review of Systems   Constitutional:  Negative for activity change, diaphoresis, fatigue and fever.   HENT:  Positive for hearing loss. Negative for congestion, facial swelling, rhinorrhea, sinus pressure, sinus pain, sneezing, sore throat and voice change.    Eyes:  Negative for discharge and visual disturbance.   Respiratory:  Negative for cough, choking, chest tightness, shortness of breath, wheezing and stridor.    Cardiovascular:  Negative for chest pain, palpitations and leg swelling.   Gastrointestinal:  Negative for abdominal distention, abdominal pain, constipation, diarrhea, nausea and vomiting.   Endocrine: Negative for polydipsia, polyphagia and polyuria.   Genitourinary:  Negative for difficulty urinating, dysuria, frequency and urgency.   Musculoskeletal:  Negative for arthralgias, back pain, gait problem, joint swelling, myalgias, neck pain and neck stiffness.   Skin:  Negative for color change, rash and wound.   Neurological:  Positive for dizziness. Negative for syncope, speech difficulty, weakness, light-headedness and headaches.   Hematological:  Negative for adenopathy. Does not bruise/bleed easily.   Psychiatric/Behavioral:  Negative for agitation, behavioral problems, confusion, hallucinations, sleep disturbance and suicidal ideas. The patient is not nervous/anxious.      Objective     /62   Pulse 63   Temp 97.6 °F (36.4 °C)   Resp 18   Ht 5' 8\" (1.727 m)   Wt 68 kg (150 lb)   SpO2 96%   BMI 22.81 kg/m²     Physical Exam  Vitals and nursing note reviewed.   Constitutional:       General: She is not in acute distress.     Appearance: She is well-developed. She is not diaphoretic.   HENT:      Right Ear: Tympanic membrane, ear " canal and external ear normal.      Left Ear: Tympanic membrane, ear canal and external ear normal.   Neck:      Trachea: No tracheal deviation.   Cardiovascular:      Rate and Rhythm: Normal rate and regular rhythm.      Heart sounds: Normal heart sounds.   Pulmonary:      Effort: Pulmonary effort is normal. No respiratory distress.      Breath sounds: Normal breath sounds. No wheezing.   Musculoskeletal:         General: No tenderness or deformity. Normal range of motion.   Skin:     General: Skin is warm and dry.      Findings: No erythema or rash.   Neurological:      Mental Status: She is alert and oriented to person, place, and time.   Psychiatric:         Mood and Affect: Mood normal.         Behavior: Behavior normal. Behavior is cooperative.         Thought Content: Thought content normal.         Judgment: Judgment normal.       Medications have been reviewed by provider in current encounter    Administrative Statements

## 2024-07-11 PROBLEM — R19.7 BLOODY DIARRHEA: Status: RESOLVED | Noted: 2024-06-11 | Resolved: 2024-07-11

## 2024-07-29 PROCEDURE — 87186 SC STD MICRODIL/AGAR DIL: CPT | Performed by: OTOLARYNGOLOGY

## 2024-07-29 PROCEDURE — 87070 CULTURE OTHR SPECIMN AEROBIC: CPT | Performed by: OTOLARYNGOLOGY

## 2024-07-29 PROCEDURE — 87205 SMEAR GRAM STAIN: CPT | Performed by: OTOLARYNGOLOGY

## 2024-07-29 PROCEDURE — 87147 CULTURE TYPE IMMUNOLOGIC: CPT | Performed by: OTOLARYNGOLOGY

## 2024-08-03 DIAGNOSIS — M79.662 PAIN OF LEFT CALF: ICD-10-CM

## 2024-08-03 DIAGNOSIS — E03.9 ACQUIRED HYPOTHYROIDISM: ICD-10-CM

## 2024-08-04 RX ORDER — LEVOTHYROXINE SODIUM 0.1 MG/1
TABLET ORAL
Qty: 90 TABLET | Refills: 1 | Status: SHIPPED | OUTPATIENT
Start: 2024-08-04

## 2024-08-05 NOTE — TELEPHONE ENCOUNTER
Patient requesting refill(s) of: Tizanidine     Last filled: 2/5/24  Last appt: 6/24/24  Next appt: 11/8/24  Pharmacy: [facility]

## 2024-08-07 ENCOUNTER — HOSPITAL ENCOUNTER (OUTPATIENT)
Dept: CT IMAGING | Facility: HOSPITAL | Age: 74
Discharge: HOME | End: 2024-08-07
Attending: OTOLARYNGOLOGY
Payer: COMMERCIAL

## 2024-08-07 ENCOUNTER — EVALUATION (OUTPATIENT)
Dept: PHYSICAL THERAPY | Facility: CLINIC | Age: 74
End: 2024-08-07
Payer: COMMERCIAL

## 2024-08-07 DIAGNOSIS — H93.A3 PULSATILE TINNITUS OF BOTH EARS: ICD-10-CM

## 2024-08-07 DIAGNOSIS — R42 DIZZINESS: Primary | ICD-10-CM

## 2024-08-07 PROCEDURE — 97163 PT EVAL HIGH COMPLEX 45 MIN: CPT | Performed by: PHYSICAL THERAPIST

## 2024-08-07 PROCEDURE — 70498 CT ANGIOGRAPHY NECK: CPT

## 2024-08-07 PROCEDURE — 70496 CT ANGIOGRAPHY HEAD: CPT

## 2024-08-07 RX ORDER — TIZANIDINE 2 MG/1
TABLET ORAL
Qty: 270 TABLET | Refills: 1 | Status: SHIPPED | OUTPATIENT
Start: 2024-08-07

## 2024-08-07 RX ADMIN — IOHEXOL 85 ML: 350 INJECTION, SOLUTION INTRAVENOUS at 08:29

## 2024-08-07 NOTE — LETTER
2024    Hemant Lester, DO  [address] [address]    Patient: Radha Saini   YOB: 1950   Date of Visit: 2024     Encounter Diagnosis     ICD-10-CM    1. Dizziness  R42 Ambulatory referral to Physical Therapy      2. Pulsatile tinnitus of both ears  H93.A3 Ambulatory referral to Physical Therapy          Dear Dr. Lester:    Thank you for your recent referral of Radha Saini. Please review the attached evaluation summary from Radha's recent visit.     Please verify that you agree with the plan of care by signing the attached order.     If you have any questions or concerns, please do not hesitate to call.     I sincerely appreciate the opportunity to share in the care of one of your patients and hope to have another opportunity to work with you in the near future.       Sincerely,    Chikis Blood, PT      Referring Provider:      I certify that I have read the below Plan of Care and certify the need for these services furnished under this plan of treatment while under my care.                    Hemant Lester DO  [address] [address]  Via Fax: [phone]          PT Evaluation     Today's date: 2024  Patient name: Radha Saini  : 1950  MRN: [id number]  Referring provider: Hemant Lester, *  Dx:   Encounter Diagnosis     ICD-10-CM    1. Dizziness  R42 Ambulatory referral to Physical Therapy      2. Pulsatile tinnitus of both ears  H93.A3 Ambulatory referral to Physical Therapy          Start Time: 1520  Stop Time: 1610  Total time in clinic (min): 50 minutes    Assessment  Impairments: abnormal gait, abnormal movement, activity intolerance, impaired balance, lacks appropriate home exercise program, safety issue and poor posture     Assessment details: Radha Saini was seen for an initial PT evaluation today. Patient is a 73 y.o. female with diagnosis of dizziness and past medical history significant for L  refractive surgery, multiple tooth extractions, eye surgery, mouth surgery, toe surgery, myringoplasty, tonsillectomy, colitis, tonsil CA, depression, anxiety, HTN, dissection of carotid artery, tympanic membrane perforation, hearing loss, neck pain. High complexity evaluation  due to number of participation restrictions, functional outcome measure of 54% limitation, and unstable clinical presentation. Findings today show symptom no provocation or nystagmus with positional testing indicating absence of BPPV. Did display poor balance/proprioception with tandem balance most pronounced on L, symptom provocation with VOR testing, and oculomotor deficits impacting their ability to perform dynamic functional activities including walking, transfers, and bed mobility tasks. Vestibular hypofunction likely significant contributor to current deficits. Skilled PT indicated to treat at this time to address above stated deficits and return patient to PLOF.       Goals  STG (6 weeks)  1. Reduction in dizziness symptoms by 75% for increased independence with daily tasks.   2. Improve tandem stance with R foot fwd to 30 sec for equal bilaterally.   LTG (12 weeks)  1. Reduction in dizziness symptoms by 100% for full return to PLOF.   2. Patient will meet FOTO prediction score for return to full function.  3. Patient will be independent with advanced home exercise program for continued maintenance post discharge.      Plan  Patient would benefit from: skilled physical therapy    Planned therapy interventions: manual therapy, neuromuscular re-education, canalith repositioning, self care, therapeutic activities, therapeutic exercise and home exercise program    Frequency: 2x week  Duration in weeks: 12  Plan of Care beginning date: 8/7/2024  Plan of Care expiration date: 10/7/2024  Treatment plan discussed with: patient  Plan details: Patient will hold with PT until imaging results are in and call to set up f/u appointments pending  "results.          Subjective Evaluation    History of Present Illness  Mechanism of injury: Radha Saini is a 73 y.o. female who presents to outpatient Physical Therapy today with complaints of dizziness.  Dizziness began about 3 months ago. History of current L ear infection with R ear drainage. C/o ringing and pressure in head. Also recent history of dental surgery with lower portion of teeth removed. About a month ago was admitted to hospital for intestine infection.     H/o tonsil CA with radiation impacting ears.     Patient Goals  Patient goal: \"Find out whats going on in my head\". Decrease dizziness and increase transfer abiltiy without provocation of symptoms.  Social Support  Steps to enter house: yes  1  Stairs in house: no (basement laundry)   Lives in: one-story house  Lives with: significant other    Employment status: not working        Objective     Concurrent Complaints  Positive for headaches, tinnitus, visual change, hearing loss and aural fullness.     Active Range of Motion   Cervical/Thoracic Spine       Cervical    Flexion: Neck active flexion: WFL.   Extension: Neck active extension: WFL.      Left lateral flexion: Neck active lateral bend left: WFL.      Right lateral flexion: Neck active lateral bend right: Mod limited.      Left rotation: Neck active rotation left: mod limited.  Right rotation: Neck active rotation right: WFL.     Neuro Exam:     Dizziness  Positive for disequilibrium and oscillopsia (since eye surgery).   Negative for vertigo, motion sickness and diplopia.     Exacerbating factors  Positive for bending over, looking up, turning head and supine to/from sitting.     Headaches   Patient reports headaches: Yes.     Cervical exam   Ligament Laxity Testing   Alar ligament: WNL  [place]: WNL  Modified VBI   Left: asymptomatic  Right: asymptomatic  Seated posture: forward head posture and internally rotated shoulders    Oculomotor exam   Oculomotor ROM: WNL and diplopia with " gaze to R  Resting nystagmus: not present   Gaze holding nystagmus: not present left  and not present right  Smooth pursuits: within normal limits  Vertical saccades: normal and slight symptomatic  Horizontal saccades: normal and slight symptomatic  Convergence: >22cm  Convergence: abnormal  Cover test: normal  Crossover test: normal  Head thrust: left abnormal and right abnormal  Dynamic head: VOR vert and horiz symptomatic    Positional testing   Crumrod-Hallpike   Left posterior canal: WNL  Right posterior canal: WNL  Roll test   Left horizontal canal: WNL  Right horizontal canal: WNL    Functional outcomes   Functional outcome comment: Tandem R fwd=18 sec L fwd=30 sec             Precautions: history of CA  Access code:   Progress note: 9/7  POC: 11/7    Manuals 8/7                                       Neuro Re-Ed        UBE *       VOR *       Tandem        romberg        airex        fitter        hurdles        Stair balance        Step taps        Walk with head nod        Walk with head turn        Tandem walk        Side step        Backwards walk        Ther Ex                                                                                                                                        Ther Activity        Step ups        Sit to stand        Cone weave        Cone pickup        Sit to stand with step tap        Nayan *       Gait Training                        Modalities

## 2024-08-07 NOTE — PROGRESS NOTES
PT Evaluation     Today's date: 2024  Patient name: Radha Saini  : 1950  MRN: [id number]  Referring provider: Hemant Lester, *  Dx:   Encounter Diagnosis     ICD-10-CM    1. Dizziness  R42 Ambulatory referral to Physical Therapy      2. Pulsatile tinnitus of both ears  H93.A3 Ambulatory referral to Physical Therapy          Start Time: 1520  Stop Time: 1610  Total time in clinic (min): 50 minutes    Assessment  Impairments: abnormal gait, abnormal movement, activity intolerance, impaired balance, lacks appropriate home exercise program, safety issue and poor posture     Assessment details: Radha Saini was seen for an initial PT evaluation today. Patient is a 73 y.o. female with diagnosis of dizziness and past medical history significant for L refractive surgery, multiple tooth extractions, eye surgery, mouth surgery, toe surgery, myringoplasty, tonsillectomy, colitis, tonsil CA, depression, anxiety, HTN, dissection of carotid artery, tympanic membrane perforation, hearing loss, neck pain. High complexity evaluation  due to number of participation restrictions, functional outcome measure of 54% limitation, and unstable clinical presentation. Findings today show symptom no provocation or nystagmus with positional testing indicating absence of BPPV. Did display poor balance/proprioception with tandem balance most pronounced on L, symptom provocation with VOR testing, and oculomotor deficits impacting their ability to perform dynamic functional activities including walking, transfers, and bed mobility tasks. Vestibular hypofunction likely significant contributor to current deficits. Skilled PT indicated to treat at this time to address above stated deficits and return patient to PLOF.       Goals  STG (6 weeks)  1. Reduction in dizziness symptoms by 75% for increased independence with daily tasks.   2. Improve tandem stance with R foot fwd to 30 sec for equal bilaterally.   LTG (12 weeks)  1. Reduction  "in dizziness symptoms by 100% for full return to PLOF.   2. Patient will meet FOTO prediction score for return to full function.  3. Patient will be independent with advanced home exercise program for continued maintenance post discharge.      Plan  Patient would benefit from: skilled physical therapy    Planned therapy interventions: manual therapy, neuromuscular re-education, canalith repositioning, self care, therapeutic activities, therapeutic exercise and home exercise program    Frequency: 2x week  Duration in weeks: 12  Plan of Care beginning date: 8/7/2024  Plan of Care expiration date: 10/7/2024  Treatment plan discussed with: patient  Plan details: Patient will hold with PT until imaging results are in and call to set up f/u appointments pending results.          Subjective Evaluation    History of Present Illness  Mechanism of injury: Radha Saini is a 73 y.o. female who presents to outpatient Physical Therapy today with complaints of dizziness.  Dizziness began about 3 months ago. History of current L ear infection with R ear drainage. C/o ringing and pressure in head. Also recent history of dental surgery with lower portion of teeth removed. About a month ago was admitted to hospital for intestine infection.     H/o tonsil CA with radiation impacting ears.     Patient Goals  Patient goal: \"Find out whats going on in my head\". Decrease dizziness and increase transfer abiltiy without provocation of symptoms.  Social Support  Steps to enter house: yes  1  Stairs in house: no (basement laundry)   Lives in: one-story house  Lives with: significant other    Employment status: not working        Objective     Concurrent Complaints  Positive for headaches, tinnitus, visual change, hearing loss and aural fullness.     Active Range of Motion   Cervical/Thoracic Spine       Cervical    Flexion: Neck active flexion: WFL.   Extension: Neck active extension: WFL.      Left lateral flexion: Neck active lateral bend left: " WFL.      Right lateral flexion: Neck active lateral bend right: Mod limited.      Left rotation: Neck active rotation left: mod limited.  Right rotation: Neck active rotation right: WFL.     Neuro Exam:     Dizziness  Positive for disequilibrium and oscillopsia (since eye surgery).   Negative for vertigo, motion sickness and diplopia.     Exacerbating factors  Positive for bending over, looking up, turning head and supine to/from sitting.     Headaches   Patient reports headaches: Yes.     Cervical exam   Ligament Laxity Testing   Alar ligament: WNL  [place]: WNL  Modified VBI   Left: asymptomatic  Right: asymptomatic  Seated posture: forward head posture and internally rotated shoulders    Oculomotor exam   Oculomotor ROM: WNL and diplopia with gaze to R  Resting nystagmus: not present   Gaze holding nystagmus: not present left  and not present right  Smooth pursuits: within normal limits  Vertical saccades: normal and slight symptomatic  Horizontal saccades: normal and slight symptomatic  Convergence: >22cm  Convergence: abnormal  Cover test: normal  Crossover test: normal  Head thrust: left abnormal and right abnormal  Dynamic head: VOR vert and horiz symptomatic    Positional testing   Romy-Hallpike   Left posterior canal: WNL  Right posterior canal: WNL  Roll test   Left horizontal canal: WNL  Right horizontal canal: WNL    Functional outcomes   Functional outcome comment: Tandem R fwd=18 sec L fwd=30 sec             Precautions: history of CA  Access code:   Progress note: 9/7  POC: 11/7    Manuals 8/7                                       Neuro Re-Ed        UBE *       VOR *       Tandem        romberg        airex        fitter        hurdles        Stair balance        Step taps        Walk with head nod        Walk with head turn        Tandem walk        Side step        Backwards walk        Ther Ex                                                                                                                                         Ther Activity        Step ups        Sit to stand        Cone weave        Cone pickup        Sit to stand with step tap        Nayan *       Gait Training                        Modalities

## 2024-08-08 ENCOUNTER — TELEPHONE (OUTPATIENT)
Dept: PHYSICAL THERAPY | Facility: CLINIC | Age: 74
End: 2024-08-08

## 2024-08-14 ENCOUNTER — OFFICE VISIT (OUTPATIENT)
Dept: GASTROENTEROLOGY | Facility: CLINIC | Age: 74
End: 2024-08-14
Payer: COMMERCIAL

## 2024-08-14 ENCOUNTER — PREP FOR PROCEDURE (OUTPATIENT)
Dept: GASTROENTEROLOGY | Facility: CLINIC | Age: 74
End: 2024-08-14

## 2024-08-14 VITALS
TEMPERATURE: 97 F | SYSTOLIC BLOOD PRESSURE: 110 MMHG | BODY MASS INDEX: 22.73 KG/M2 | WEIGHT: 150 LBS | HEIGHT: 68 IN | DIASTOLIC BLOOD PRESSURE: 70 MMHG

## 2024-08-14 DIAGNOSIS — K55.9 ISCHEMIC COLITIS (HCC): ICD-10-CM

## 2024-08-14 DIAGNOSIS — K92.1 HEMATOCHEZIA: Primary | ICD-10-CM

## 2024-08-14 DIAGNOSIS — R93.3 ABNORMAL CT SCAN, COLON: ICD-10-CM

## 2024-08-14 PROCEDURE — 99213 OFFICE O/P EST LOW 20 MIN: CPT | Performed by: STUDENT IN AN ORGANIZED HEALTH CARE EDUCATION/TRAINING PROGRAM

## 2024-08-14 NOTE — PROGRESS NOTES
[facility] - Outpatient Follow-up Note  Radha Saini 73 y.o. female MRN: [id number]  Encounter: [id number]          ASSESSMENT AND PLAN:    73F with HTN, depression, anxiety, hypothyroidism, distant history of head and neck cancer here for follow-up of acute onset abdominal pain, nausea, vomiting, hematochezia concerning for ischemic colitis versus infectious gastroenteritis.  Symptoms have resolved.  Will plan for colonoscopy to ensure no underlying malignancy or chronic colitis. Last colonoscopy 2012  1. Hematochezia  2. Abnormal CT scan, colon  3. Ischemic colitis (HCC)  - Colonoscopy; Future    ______________________________________________________________________    SUBJECTIVE:    Feels better now.  Bowels are back to baseline. Has 1 BM per day, formed and brown.  No abdominal pain.  No blood in stool.    Reviewed June 2024 hospitalization for gastroenteritis.    2022 Cologuard negative  2012 colonoscopy normal    REVIEW OF SYSTEMS IS OTHERWISE NEGATIVE.      Historical Information   Past Medical History:   Diagnosis Date    Anxiety     Cancer (HCC) 2009    Tonsils- followed by Oncology( Dr. pappas, Dr. Carrera)    Colitis 06/11/2024    Disease of thyroid gland      Past Surgical History:   Procedure Laterality Date    DENTAL SURGERY      EYE SURGERY      INCISION AND DRAINAGE OF WOUND Right 08/28/2021    Procedure: INCISION AND DRAINAGE (I&D) HEAD/FACE;  Surgeon: Jeferson Dunn DMD;  Location: [facility];  Service: Maxillofacial    MOUTH SURGERY      MULTIPLE TOOTH EXTRACTIONS Right 08/28/2021    Procedure: EXTRACTION TEETH 25,26,27;  Surgeon: Jeferson Dunn DMD;  Location: [facility];  Service: Maxillofacial    MYRINGOPLASTY      REFRACTIVE SURGERY Left 10/27/2023    TOE SURGERY      TONSILLECTOMY      Dr. Lester     Social History   Social History     Substance and Sexual Activity   Alcohol Use Yes     Social History     Substance and Sexual Activity   Drug Use Never  "    Social History     Tobacco Use   Smoking Status Former    Types: Cigarettes   Smokeless Tobacco Never     Family History   Problem Relation Age of Onset    Cancer Mother     No Known Problems Father     Breast cancer Sister 30    No Known Problems Sister     No Known Problems Sister     No Known Problems Sister     No Known Problems Daughter     Diabetes Maternal Grandmother     No Known Problems Maternal Grandfather     No Known Problems Paternal Grandmother     No Known Problems Paternal Grandfather     No Known Problems Maternal Aunt     No Known Problems Maternal Aunt     Breast cancer additional onset Neg Hx     BRCA2 Negative Neg Hx     BRCA2 Positive Neg Hx     BRCA1 Positive Neg Hx     BRCA1 Negative Neg Hx     BRCA 1/2 Neg Hx     Ovarian cancer Neg Hx     Endometrial cancer Neg Hx     Colon cancer Neg Hx        Meds/Allergies       Current Outpatient Medications:     acetaminophen (TYLENOL) 325 mg tablet    ascorbic acid (VITAMIN C) 1000 MG tablet    aspirin (Aspirin 81) 81 mg EC tablet    busPIRone (BUSPAR) 10 mg tablet    FLUoxetine (PROzac) 60 MG TABS    fluticasone (FLONASE) 50 mcg/act nasal spray    levothyroxine 100 mcg tablet    lisinopril (ZESTRIL) 20 mg tablet    LORazepam (ATIVAN) 2 mg tablet    Melatonin 10 MG TABS    Multiple Vitamin (MULTIVITAMIN) tablet    pantoprazole (PROTONIX) 40 mg tablet    tiZANidine (ZANAFLEX) 2 mg tablet    tobramycin-dexamethasone (TOBRADEX) ophthalmic suspension    TURMERIC PO    vitamin B-12 (VITAMIN B-12) 500 mcg tablet    Vitamin D, Cholecalciferol, 50 MCG (2000 UT) CAPS    No Known Allergies        Objective     Blood pressure 110/70, temperature (!) 97 °F (36.1 °C), temperature source Tympanic, height 5' 8\" (1.727 m), weight 68 kg (150 lb). Body mass index is 22.81 kg/m².      PHYSICAL EXAM:      General Appearance:   Alert, cooperative, no distress   HEENT:   Normocephalic, atraumatic, anicteric.     Neck:  Supple, symmetrical, trachea midline   Lungs:   " Clear to auscultation bilaterally; no rales, rhonchi or wheezing; respirations unlabored    Heart::   Regular rate and rhythm; no murmur, rub, or gallop.   Abdomen:   Soft, non-tender, non-distended; normal bowel sounds; no masses, no organomegaly    Genitalia:   Deferred    Rectal:   Deferred    Extremities:  No cyanosis, clubbing or edema    Pulses:  2+ and symmetric    Skin:  No jaundice, rashes, or lesions    Lymph nodes:  No palpable cervical lymphadenopathy        Lab Results:   No visits with results within 1 Day(s) from this visit.   Latest known visit with results is:   Orders Only on 07/29/2024   Component Date Value    Wound Culture 07/29/2024 3+ Growth of Methicillin Resistant Staphylococcus aureus (A)     Gram Stain Result 07/29/2024 No polys seen (A)     Gram Stain Result 07/29/2024 2+ Gram positive cocci in clusters (A)          Radiology Results:   CTA head and neck w wo contrast    Result Date: 8/8/2024  Narrative: CTA NECK AND BRAIN WITH AND WITHOUT CONTRAST INDICATION: H93.A3: Pulsatile tinnitus, bilateral COMPARISON:   6/22/2023 TECHNIQUE:  Routine CT imaging of the Brain without contrast.Post contrast imaging was performed after administration of iodinated contrast through the neck and brain. Post contrast axial 0.625 mm images timed to opacify the arterial system.  3D rendering was performed on an independent workstation.   MIP reconstructions performed. Coronal and sagittal reconstructions were performed of the non contrast portion of the brain. Radiation dose length product (DLP) for this visit:  1134.28 mGy-cm .  This examination, like all CT scans performed in the [facility], was performed utilizing techniques to minimize radiation dose exposure, including the use of iterative reconstruction and automated exposure control. IV Contrast:  85 mL of iohexol (OMNIPAQUE) IMAGE QUALITY:   Diagnostic FINDINGS: NONCONTRAST BRAIN PARENCHYMA:No intracranial mass, mass effect or  midline shift. No CT signs of acute infarction.  No acute parenchymal hemorrhage. VENTRICLES AND EXTRA-AXIAL SPACES:Normal for the patient's age. VISUALIZED ORBITS: Normal. PARANASAL SINUSES: Normal. CTA NECK ARCH AND GREAT VESSELS: Conventional configuration. Visualized arch and great vessels are normal. VERTEBRAL ARTERIES: Moderate stenosis at the origin of the right vertebral artery. Hypoplastic. RIGHT CAROTID: Redemonstrated short segment dissection with 0.5 cm posteriorly projecting pseudoaneurysm. Less than 50% stenosis. LEFT CAROTID: Mixed plaque left carotid bifurcation. Less than 50% stenosis.    No dissection. NASCET criteria was used to determine the degree of internal carotid artery diameter stenosis. CTA BRAIN: INTERNAL CAROTID ARTERIES: No stenosis or occlusion. ANTERIOR CEREBRAL ARTERY CIRCULATION:  No stenosis or occlusion. MIDDLE CEREBRAL ARTERY CIRCULATION:  No stenosis or occlusion. DISTAL VERTEBRAL ARTERIES:  No stenosis or occlusion. BASILAR ARTERY:  No stenosis or occlusion. POSTERIOR CEREBRAL ARTERIES: No stenosis or occlusion. VENOUS STRUCTURES:  Normal. NON VASCULAR ANATOMY BONY STRUCTURES:  No acute osseous abnormality. SOFT TISSUES OF THE NECK: Atrophic submandibular glands. Moderate bilateral maxillary sinus mucosal disease. No suspicious mass or adenopathy. THORACIC INLET: Biapical scarring.     Impression: *  CT Brain:  No acute intracranial abnormality. 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 No large vessel occlusion in the head or neck. *  See comments above for full analysis. Workstation performed: [id number]

## 2024-09-16 ENCOUNTER — APPOINTMENT (EMERGENCY)
Dept: CT IMAGING | Facility: HOSPITAL | Age: 74
End: 2024-09-16
Payer: COMMERCIAL

## 2024-09-16 ENCOUNTER — HOSPITAL ENCOUNTER (EMERGENCY)
Facility: HOSPITAL | Age: 74
Discharge: HOME | End: 2024-09-16
Attending: EMERGENCY MEDICINE
Payer: COMMERCIAL

## 2024-09-16 VITALS
TEMPERATURE: 98.6 F | DIASTOLIC BLOOD PRESSURE: 82 MMHG | OXYGEN SATURATION: 97 % | HEART RATE: 82 BPM | WEIGHT: 150 LBS | SYSTOLIC BLOOD PRESSURE: 155 MMHG | BODY MASS INDEX: 22.81 KG/M2 | RESPIRATION RATE: 16 BRPM

## 2024-09-16 DIAGNOSIS — S16.1XXA STRAIN OF NECK MUSCLE, INITIAL ENCOUNTER: ICD-10-CM

## 2024-09-16 DIAGNOSIS — S09.90XA CLOSED HEAD INJURY, INITIAL ENCOUNTER: ICD-10-CM

## 2024-09-16 DIAGNOSIS — M54.16 LUMBAR RADICULOPATHY: ICD-10-CM

## 2024-09-16 DIAGNOSIS — M51.369 BULGING LUMBAR DISC: Primary | ICD-10-CM

## 2024-09-16 LAB
ANION GAP SERPL CALCULATED.3IONS-SCNC: 11 MMOL/L (ref 4–13)
APTT PPP: 26 SECONDS (ref 23–34)
BASOPHILS # BLD AUTO: 0.02 THOUSANDS/ΜL (ref 0–0.1)
BASOPHILS NFR BLD AUTO: 1 % (ref 0–1)
BUN SERPL-MCNC: 21 MG/DL (ref 5–25)
CALCIUM SERPL-MCNC: 9.3 MG/DL (ref 8.4–10.2)
CHLORIDE SERPL-SCNC: 101 MMOL/L (ref 96–108)
CO2 SERPL-SCNC: 25 MMOL/L (ref 21–32)
CREAT SERPL-MCNC: 1.02 MG/DL (ref 0.6–1.3)
EOSINOPHIL # BLD AUTO: 0.1 THOUSAND/ΜL (ref 0–0.61)
EOSINOPHIL NFR BLD AUTO: 2 % (ref 0–6)
ERYTHROCYTE [DISTWIDTH] IN BLOOD BY AUTOMATED COUNT: 11.8 % (ref 11.6–15.1)
GFR SERPL CREATININE-BSD FRML MDRD: 54 ML/MIN/1.73SQ M
GLUCOSE SERPL-MCNC: 82 MG/DL (ref 65–140)
HCT VFR BLD AUTO: 36.8 % (ref 34.8–46.1)
HGB BLD-MCNC: 12 G/DL (ref 11.5–15.4)
IMM GRANULOCYTES # BLD AUTO: 0.01 THOUSAND/UL (ref 0–0.2)
IMM GRANULOCYTES NFR BLD AUTO: 0 % (ref 0–2)
INR PPP: 0.9 (ref 0.85–1.19)
LYMPHOCYTES # BLD AUTO: 1.04 THOUSANDS/ΜL (ref 0.6–4.47)
LYMPHOCYTES NFR BLD AUTO: 25 % (ref 14–44)
MCH RBC QN AUTO: 31.6 PG (ref 26.8–34.3)
MCHC RBC AUTO-ENTMCNC: 32.6 G/DL (ref 31.4–37.4)
MCV RBC AUTO: 97 FL (ref 82–98)
MONOCYTES # BLD AUTO: 0.32 THOUSAND/ΜL (ref 0.17–1.22)
MONOCYTES NFR BLD AUTO: 8 % (ref 4–12)
NEUTROPHILS # BLD AUTO: 2.63 THOUSANDS/ΜL (ref 1.85–7.62)
NEUTS SEG NFR BLD AUTO: 64 % (ref 43–75)
NRBC BLD AUTO-RTO: 0 /100 WBCS
PLATELET # BLD AUTO: 216 THOUSANDS/UL (ref 149–390)
PMV BLD AUTO: 9.9 FL (ref 8.9–12.7)
POTASSIUM SERPL-SCNC: 4.1 MMOL/L (ref 3.5–5.3)
PROTHROMBIN TIME: 12.7 SECONDS (ref 12.3–15)
RBC # BLD AUTO: 3.8 MILLION/UL (ref 3.81–5.12)
SODIUM SERPL-SCNC: 137 MMOL/L (ref 135–147)
WBC # BLD AUTO: 4.12 THOUSAND/UL (ref 4.31–10.16)

## 2024-09-16 PROCEDURE — 85610 PROTHROMBIN TIME: CPT | Performed by: NURSE PRACTITIONER

## 2024-09-16 PROCEDURE — 96376 TX/PRO/DX INJ SAME DRUG ADON: CPT

## 2024-09-16 PROCEDURE — 99284 EMERGENCY DEPT VISIT MOD MDM: CPT

## 2024-09-16 PROCEDURE — 99284 EMERGENCY DEPT VISIT MOD MDM: CPT | Performed by: NURSE PRACTITIONER

## 2024-09-16 PROCEDURE — 36415 COLL VENOUS BLD VENIPUNCTURE: CPT | Performed by: NURSE PRACTITIONER

## 2024-09-16 PROCEDURE — 85730 THROMBOPLASTIN TIME PARTIAL: CPT | Performed by: NURSE PRACTITIONER

## 2024-09-16 PROCEDURE — 72131 CT LUMBAR SPINE W/O DYE: CPT

## 2024-09-16 PROCEDURE — 70450 CT HEAD/BRAIN W/O DYE: CPT

## 2024-09-16 PROCEDURE — 96374 THER/PROPH/DIAG INJ IV PUSH: CPT

## 2024-09-16 PROCEDURE — 72125 CT NECK SPINE W/O DYE: CPT

## 2024-09-16 PROCEDURE — 80048 BASIC METABOLIC PNL TOTAL CA: CPT | Performed by: NURSE PRACTITIONER

## 2024-09-16 PROCEDURE — 85025 COMPLETE CBC W/AUTO DIFF WBC: CPT | Performed by: NURSE PRACTITIONER

## 2024-09-16 RX ORDER — PREDNISONE 20 MG/1
60 TABLET ORAL ONCE
Status: COMPLETED | OUTPATIENT
Start: 2024-09-16 | End: 2024-09-16

## 2024-09-16 RX ORDER — ACETAMINOPHEN 325 MG/1
650 TABLET ORAL ONCE
Status: COMPLETED | OUTPATIENT
Start: 2024-09-16 | End: 2024-09-16

## 2024-09-16 RX ORDER — PREDNISONE 20 MG/1
20 TABLET ORAL DAILY
Qty: 15 TABLET | Refills: 0 | Status: SHIPPED | OUTPATIENT
Start: 2024-09-16

## 2024-09-16 RX ORDER — OXYCODONE HYDROCHLORIDE 5 MG/1
5 TABLET ORAL EVERY 6 HOURS PRN
Qty: 20 TABLET | Refills: 0 | Status: SHIPPED | OUTPATIENT
Start: 2024-09-16

## 2024-09-16 RX ORDER — ACETAMINOPHEN 500 MG
500 TABLET ORAL EVERY 6 HOURS PRN
Qty: 20 TABLET | Refills: 0 | Status: SHIPPED | OUTPATIENT
Start: 2024-09-16 | End: 2024-09-21

## 2024-09-16 RX ORDER — MORPHINE SULFATE 4 MG/ML
4 INJECTION, SOLUTION INTRAMUSCULAR; INTRAVENOUS ONCE
Status: COMPLETED | OUTPATIENT
Start: 2024-09-16 | End: 2024-09-16

## 2024-09-16 RX ADMIN — PREDNISONE 60 MG: 20 TABLET ORAL at 17:50

## 2024-09-16 RX ADMIN — MORPHINE SULFATE 4 MG: 4 INJECTION INTRAVENOUS at 17:47

## 2024-09-16 RX ADMIN — MORPHINE SULFATE 4 MG: 4 INJECTION, SOLUTION INTRAMUSCULAR; INTRAVENOUS at 16:41

## 2024-09-16 RX ADMIN — ACETAMINOPHEN 650 MG: 325 TABLET ORAL at 17:51

## 2024-09-16 NOTE — ED PROVIDER NOTES
No diagnosis found.  ED Disposition       None          Assessment & Plan   {Hyperlinks  Risk Stratification - NIHSS - HEART SCORE - Fill out sepsis note and make sure you call [phone] if severe or septic shock:[id number]}    Medical Decision Making  Amount and/or Complexity of Data Reviewed  Labs: ordered.  Radiology: ordered.    Risk  Prescription drug management.      ***                 Medications   morphine injection 4 mg (has no administration in time range)       History of Present Illness   {Hyperlinks  History (Med, Surg, Fam, Social) - Current Medications - Allergies  :[id number]}    74-year-old female who reports falling this past weekend off of her bed.  She fell backwards striking her head she has a small lump over the posterior occiput she has some paracervical tenderness and she has some lumbar tenderness as a result of this.  She has some right-sided thigh pain which seems consistent with radiculopathy.  She denies any saddle anesthesia.  No incontinence.  She has been taking over-the-counter ibuprofen without relief.      Fall  Associated symptoms: back pain and headaches    Associated symptoms: no abdominal pain, no chest pain and no vomiting        ***    Review of Systems   Constitutional:  Negative for diaphoresis, fatigue and fever.   HENT:  Negative for congestion, ear pain, nosebleeds and sore throat.    Eyes:  Negative for photophobia, pain, discharge and visual disturbance.   Respiratory:  Negative for cough, choking, chest tightness, shortness of breath and wheezing.    Cardiovascular:  Negative for chest pain and palpitations.   Gastrointestinal:  Negative for abdominal distention, abdominal pain, diarrhea and vomiting.   Genitourinary:  Negative for dysuria, flank pain and frequency.   Musculoskeletal:  Positive for back pain. Negative for gait problem and joint swelling.   Skin:  Negative for color change and rash.   Neurological:  Positive for headaches. Negative for dizziness and  syncope.   Psychiatric/Behavioral:  Negative for behavioral problems and confusion. The patient is not nervous/anxious.    All other systems reviewed and are negative.          Objective   {Hyperlinks  Historical Vitals - Historical Labs - Chart Review/Microbiology - Last Echo - Code Status  :[id number]}  ED Triage Vitals [09/16/24 1636]   Temperature Pulse Blood Pressure Respirations SpO2 Patient Position - Orthostatic VS   98.6 °F (37 °C) 76 155/78 17 99 % Sitting      Temp Source Heart Rate Source BP Location FiO2 (%) Pain Score    Temporal Monitor -- -- --        Physical Exam  Vitals and nursing note reviewed.   Constitutional:       General: She is not in acute distress.     Appearance: She is well-developed. She is not ill-appearing or toxic-appearing.   HENT:      Head: Normocephalic and atraumatic.        Nose: No rhinorrhea.      Mouth/Throat:      Mouth: Mucous membranes are moist.      Dentition: Normal dentition.   Eyes:      General:         Right eye: No discharge.         Left eye: No discharge.   Cardiovascular:      Rate and Rhythm: Normal rate and regular rhythm.   Pulmonary:      Effort: Pulmonary effort is normal. No accessory muscle usage or respiratory distress.   Abdominal:      General: There is no distension.      Tenderness: There is no guarding.   Musculoskeletal:         General: Normal range of motion.      Cervical back: Normal range of motion and neck supple. No rigidity. Muscular tenderness present. No spinous process tenderness.      Lumbar back: Spasms and tenderness present.   Skin:     General: Skin is warm and dry.   Neurological:      Mental Status: She is alert and oriented to person, place, and time.      Coordination: Coordination normal.   Psychiatric:         Behavior: Behavior is cooperative.         Labs Reviewed   CBC AND DIFFERENTIAL   BASIC METABOLIC PANEL   PROTIME-INR   APTT     CT head without contrast    (Results Pending)   CT cervical spine without contrast     (Results Pending)   CT spine lumbar without contrast    (Results Pending)       Procedures     with normal or high GFR (GFR > 90 mL/min/1.73 square meters)    Stage 2 Mild CKD (GFR = 60-89 mL/min/1.73 square meters)    Stage 3A Moderate CKD (GFR = 45-59 mL/min/1.73 square meters)    Stage 3B Moderate CKD (GFR = 30-44 mL/min/1.73 square meters)    Stage 4 Severe CKD (GFR = 15-29 mL/min/1.73 square meters)    Stage 5 End Stage CKD (GFR <15 mL/min/1.73 square meters)  Note: GFR calculation is accurate only with a steady state creatinine     CT head without contrast   Final Interpretation by Javier Jaimes MD (09/16 1703)      No acute intracranial abnormality.                  Workstation performed: [id number]         CT cervical spine without contrast   Final Interpretation by Javier Jaimes MD (09/16 1708)      No cervical spine fracture or traumatic malalignment.                  Workstation performed: [id number]         CT spine lumbar without contrast   Final Interpretation by Javier Jaimes MD (09/16 1710)      1. Worsening spondylosis at L2-L3 with now mild central stenosis.   2. Worsening, moderate right foraminal stenosis at L5-S1 due to disc bulge and facet/marginal osteophytes.   3. Spondylosis is otherwise unchanged with severe central stenosis at L3-L4 and moderate central stenosis at L4-L5.   4. Chronic L3 superior endplate fracture. No acute fracture seen.      Workstation performed: [id number]             Procedures       MARY Ellis  09/27/24 0609

## 2024-09-17 ENCOUNTER — TELEPHONE (OUTPATIENT)
Dept: PHYSICAL THERAPY | Facility: OTHER | Age: 74
End: 2024-09-17

## 2024-09-19 NOTE — TELEPHONE ENCOUNTER
Call placed to the patient per [facility] referral.    Voice message left for patient to call back. Phone number and hours of business provided.     This is the 2nd attempt to reach the patient.   Referral closed.

## 2024-10-07 DIAGNOSIS — F41.8 DEPRESSION WITH ANXIETY: ICD-10-CM

## 2024-10-08 RX ORDER — FLUOXETINE HYDROCHLORIDE 60 MG/1
60 TABLET, FILM COATED ORAL; ORAL DAILY
Qty: 90 TABLET | Refills: 1 | Status: SHIPPED | OUTPATIENT
Start: 2024-10-08

## 2024-10-30 ENCOUNTER — APPOINTMENT (OUTPATIENT)
Dept: LAB | Facility: CLINIC | Age: 74
End: 2024-10-30
Payer: COMMERCIAL

## 2024-10-30 DIAGNOSIS — D53.9 DEFICIENCY ANEMIA: ICD-10-CM

## 2024-10-30 DIAGNOSIS — Z00.00 MEDICARE ANNUAL WELLNESS VISIT, SUBSEQUENT: ICD-10-CM

## 2024-10-30 DIAGNOSIS — Z13.220 SCREENING CHOLESTEROL LEVEL: ICD-10-CM

## 2024-10-30 DIAGNOSIS — R53.83 OTHER FATIGUE: ICD-10-CM

## 2024-10-30 DIAGNOSIS — E03.9 ACQUIRED HYPOTHYROIDISM: ICD-10-CM

## 2024-10-30 DIAGNOSIS — E55.9 VITAMIN D DEFICIENCY: ICD-10-CM

## 2024-10-30 LAB
25(OH)D3 SERPL-MCNC: 58.3 NG/ML (ref 30–100)
ALBUMIN SERPL BCG-MCNC: 4.2 G/DL (ref 3.5–5)
ALP SERPL-CCNC: 70 U/L (ref 34–104)
ALT SERPL W P-5'-P-CCNC: 15 U/L (ref 7–52)
ANION GAP SERPL CALCULATED.3IONS-SCNC: 9 MMOL/L (ref 4–13)
AST SERPL W P-5'-P-CCNC: 18 U/L (ref 13–39)
BASOPHILS # BLD AUTO: 0.03 THOUSANDS/ΜL (ref 0–0.1)
BASOPHILS NFR BLD AUTO: 1 % (ref 0–1)
BILIRUB SERPL-MCNC: 0.36 MG/DL (ref 0.2–1)
BUN SERPL-MCNC: 31 MG/DL (ref 5–25)
CALCIUM SERPL-MCNC: 9.3 MG/DL (ref 8.4–10.2)
CHLORIDE SERPL-SCNC: 102 MMOL/L (ref 96–108)
CHOLEST SERPL-MCNC: 214 MG/DL
CO2 SERPL-SCNC: 26 MMOL/L (ref 21–32)
CREAT SERPL-MCNC: 1.17 MG/DL (ref 0.6–1.3)
EOSINOPHIL # BLD AUTO: 0.13 THOUSAND/ΜL (ref 0–0.61)
EOSINOPHIL NFR BLD AUTO: 4 % (ref 0–6)
ERYTHROCYTE [DISTWIDTH] IN BLOOD BY AUTOMATED COUNT: 11.6 % (ref 11.6–15.1)
FERRITIN SERPL-MCNC: 13 NG/ML (ref 11–307)
GFR SERPL CREATININE-BSD FRML MDRD: 46 ML/MIN/1.73SQ M
GLUCOSE P FAST SERPL-MCNC: 94 MG/DL (ref 65–99)
HCT VFR BLD AUTO: 38.5 % (ref 34.8–46.1)
HDLC SERPL-MCNC: 86 MG/DL
HGB BLD-MCNC: 12.6 G/DL (ref 11.5–15.4)
IMM GRANULOCYTES # BLD AUTO: 0.01 THOUSAND/UL (ref 0–0.2)
IMM GRANULOCYTES NFR BLD AUTO: 0 % (ref 0–2)
IRON SATN MFR SERPL: 26 % (ref 15–50)
IRON SERPL-MCNC: 93 UG/DL (ref 50–212)
LDLC SERPL CALC-MCNC: 118 MG/DL (ref 0–100)
LYMPHOCYTES # BLD AUTO: 0.95 THOUSANDS/ΜL (ref 0.6–4.47)
LYMPHOCYTES NFR BLD AUTO: 28 % (ref 14–44)
MCH RBC QN AUTO: 31.7 PG (ref 26.8–34.3)
MCHC RBC AUTO-ENTMCNC: 32.7 G/DL (ref 31.4–37.4)
MCV RBC AUTO: 97 FL (ref 82–98)
MONOCYTES # BLD AUTO: 0.31 THOUSAND/ΜL (ref 0.17–1.22)
MONOCYTES NFR BLD AUTO: 9 % (ref 4–12)
NEUTROPHILS # BLD AUTO: 2.02 THOUSANDS/ΜL (ref 1.85–7.62)
NEUTS SEG NFR BLD AUTO: 58 % (ref 43–75)
NONHDLC SERPL-MCNC: 128 MG/DL
NRBC BLD AUTO-RTO: 0 /100 WBCS
PLATELET # BLD AUTO: 214 THOUSANDS/UL (ref 149–390)
PMV BLD AUTO: 10.4 FL (ref 8.9–12.7)
POTASSIUM SERPL-SCNC: 4.8 MMOL/L (ref 3.5–5.3)
PROT SERPL-MCNC: 7 G/DL (ref 6.4–8.4)
RBC # BLD AUTO: 3.98 MILLION/UL (ref 3.81–5.12)
SODIUM SERPL-SCNC: 137 MMOL/L (ref 135–147)
T4 FREE SERPL-MCNC: 0.92 NG/DL (ref 0.61–1.12)
TIBC SERPL-MCNC: 355 UG/DL (ref 250–450)
TRIGL SERPL-MCNC: 49 MG/DL
TSH SERPL DL<=0.05 MIU/L-ACNC: 0.37 UIU/ML (ref 0.45–4.5)
UIBC SERPL-MCNC: 262 UG/DL (ref 155–355)
WBC # BLD AUTO: 3.45 THOUSAND/UL (ref 4.31–10.16)

## 2024-10-30 PROCEDURE — 83540 ASSAY OF IRON: CPT

## 2024-10-30 PROCEDURE — 36415 COLL VENOUS BLD VENIPUNCTURE: CPT

## 2024-10-30 PROCEDURE — 83550 IRON BINDING TEST: CPT

## 2024-10-30 PROCEDURE — 82728 ASSAY OF FERRITIN: CPT

## 2024-10-30 PROCEDURE — 80053 COMPREHEN METABOLIC PANEL: CPT

## 2024-10-30 PROCEDURE — 85025 COMPLETE CBC W/AUTO DIFF WBC: CPT

## 2024-10-30 PROCEDURE — 82306 VITAMIN D 25 HYDROXY: CPT

## 2024-10-30 PROCEDURE — 84439 ASSAY OF FREE THYROXINE: CPT

## 2024-10-30 PROCEDURE — 80061 LIPID PANEL: CPT

## 2024-10-30 PROCEDURE — 84443 ASSAY THYROID STIM HORMONE: CPT

## 2024-11-01 DIAGNOSIS — I10 HYPERTENSION, UNSPECIFIED TYPE: ICD-10-CM

## 2024-11-01 RX ORDER — LISINOPRIL 20 MG/1
TABLET ORAL
Qty: 90 TABLET | Refills: 1 | Status: SHIPPED | OUTPATIENT
Start: 2024-11-01

## 2024-11-07 ENCOUNTER — TELEPHONE (OUTPATIENT)
Dept: ADMINISTRATIVE | Facility: OTHER | Age: 74
End: 2024-11-07

## 2024-11-07 NOTE — TELEPHONE ENCOUNTER
11/07/24 10:29 AM    Patient contacted to bring Advance Directive, POLST, or Living Will document to next scheduled pcp visit.[facility] Department was unable to leave a message; no answer/ line busy.    Thank you.  Sagar Maria MA  [facility]

## 2024-11-08 ENCOUNTER — OFFICE VISIT (OUTPATIENT)
Dept: FAMILY MEDICINE CLINIC | Facility: CLINIC | Age: 74
End: 2024-11-08
Payer: COMMERCIAL

## 2024-11-08 VITALS
SYSTOLIC BLOOD PRESSURE: 124 MMHG | OXYGEN SATURATION: 98 % | WEIGHT: 154 LBS | BODY MASS INDEX: 23.34 KG/M2 | HEIGHT: 68 IN | HEART RATE: 74 BPM | DIASTOLIC BLOOD PRESSURE: 62 MMHG | RESPIRATION RATE: 16 BRPM | TEMPERATURE: 98.6 F

## 2024-11-08 DIAGNOSIS — M54.41 CHRONIC RIGHT-SIDED LOW BACK PAIN WITH RIGHT-SIDED SCIATICA: ICD-10-CM

## 2024-11-08 DIAGNOSIS — M54.2 NECK PAIN: ICD-10-CM

## 2024-11-08 DIAGNOSIS — E55.9 VITAMIN D DEFICIENCY: ICD-10-CM

## 2024-11-08 DIAGNOSIS — G89.29 CHRONIC RIGHT-SIDED LOW BACK PAIN WITH RIGHT-SIDED SCIATICA: ICD-10-CM

## 2024-11-08 DIAGNOSIS — Z13.220 SCREENING CHOLESTEROL LEVEL: ICD-10-CM

## 2024-11-08 DIAGNOSIS — E03.9 ACQUIRED HYPOTHYROIDISM: Primary | ICD-10-CM

## 2024-11-08 DIAGNOSIS — I10 ESSENTIAL HYPERTENSION: ICD-10-CM

## 2024-11-08 DIAGNOSIS — Z23 ENCOUNTER FOR IMMUNIZATION: ICD-10-CM

## 2024-11-08 PROCEDURE — G0008 ADMIN INFLUENZA VIRUS VAC: HCPCS

## 2024-11-08 PROCEDURE — 90662 IIV NO PRSV INCREASED AG IM: CPT

## 2024-11-08 PROCEDURE — 99214 OFFICE O/P EST MOD 30 MIN: CPT | Performed by: NURSE PRACTITIONER

## 2024-11-08 PROCEDURE — G2211 COMPLEX E/M VISIT ADD ON: HCPCS | Performed by: NURSE PRACTITIONER

## 2024-11-08 NOTE — PROGRESS NOTES
Ambulatory Visit  Name: Radha Saini      : 1950      MRN: [id number]  Encounter Provider: MARY Gastelum  Encounter Date: 2024   Encounter department: [facility]    Assessment & Plan  Neck pain    Orders:    Ambulatory referral to Spine & Pain Management; Future    Chronic right-sided low back pain with right-sided sciatica    Orders:    Ambulatory referral to Spine & Pain Management; Future    Acquired hypothyroidism    Orders:    TSH, 3rd generation with Free T4 reflex; Future    Essential hypertension    Orders:    Comprehensive metabolic panel; Future    CBC and differential; Future    Vitamin D deficiency    Orders:    Vitamin D 25 hydroxy; Future    Screening cholesterol level    Orders:    Lipid panel; Future    Encounter for immunization    Orders:    influenza vaccine, high-dose, PF 0.5 mL IM       History of Present Illness     Here for 6 month irene  Reports continues neck and back pain since falling in September- reviewed CT scans done at ER. She restarted her muscle relaxer with some relief, increased her Lorazepam to twice a day since her anxiety is worse with increased pain, and has a massage scheduled for next week  Reviewed recent bloodwork results, TSH is 0.3, denies symptoms of hyperthyroid, would like to continue current Levothyroxine dose.         History obtained from : patient  Review of Systems   Constitutional:  Negative for activity change, diaphoresis, fatigue and fever.   HENT:  Negative for congestion, facial swelling, hearing loss, rhinorrhea, sinus pressure, sinus pain, sneezing, sore throat and voice change.    Eyes:  Negative for discharge and visual disturbance.   Respiratory:  Negative for cough, choking, chest tightness, shortness of breath, wheezing and stridor.    Cardiovascular:  Negative for chest pain, palpitations and leg swelling.   Gastrointestinal:  Negative for abdominal distention, abdominal pain, constipation, diarrhea,  "nausea and vomiting.   Endocrine: Negative for polydipsia, polyphagia and polyuria.   Genitourinary:  Negative for difficulty urinating, dysuria, frequency and urgency.   Musculoskeletal:  Positive for arthralgias and back pain. Negative for gait problem, joint swelling, neck pain and neck stiffness.   Skin:  Negative for color change, rash and wound.   Neurological:  Negative for dizziness, syncope, speech difficulty, weakness, light-headedness and headaches.   Hematological:  Negative for adenopathy. Does not bruise/bleed easily.   Psychiatric/Behavioral:  Negative for agitation, behavioral problems, confusion, hallucinations, sleep disturbance and suicidal ideas. The patient is nervous/anxious.      Medical History Reviewed by provider this encounter:  Tobacco  Allergies  Meds  Problems  Med Hx  Surg Hx  Fam Hx           Objective     /62   Pulse 74   Temp 98.6 °F (37 °C) (Tympanic)   Resp 16   Ht 5' 8\" (1.727 m)   Wt 69.9 kg (154 lb)   SpO2 98%   BMI 23.42 kg/m²     Physical Exam  Vitals and nursing note reviewed.   Constitutional:       General: She is not in acute distress.     Appearance: She is well-developed. She is not diaphoretic.   Neck:      Thyroid: No thyromegaly.      Trachea: No tracheal deviation.     Cardiovascular:      Rate and Rhythm: Normal rate and regular rhythm.      Heart sounds: Normal heart sounds. No murmur heard.  Pulmonary:      Effort: Pulmonary effort is normal. No respiratory distress.      Breath sounds: Normal breath sounds. No wheezing.   Musculoskeletal:         General: Tenderness present. Normal range of motion.        Arms:       Cervical back: Normal range of motion and neck supple. Pain with movement and muscular tenderness present.      Right lower leg: No edema.      Left lower leg: No edema.   Skin:     General: Skin is warm and dry.      Findings: No erythema or rash.   Neurological:      Mental Status: She is alert and oriented to person, place, " and time.   Psychiatric:         Mood and Affect: Mood is anxious.         Behavior: Behavior normal. Behavior is cooperative.         Thought Content: Thought content normal.         Judgment: Judgment normal.

## 2024-11-13 ENCOUNTER — CONSULT (OUTPATIENT)
Dept: PAIN MEDICINE | Facility: CLINIC | Age: 74
End: 2024-11-13
Payer: COMMERCIAL

## 2024-11-13 VITALS
HEIGHT: 68 IN | DIASTOLIC BLOOD PRESSURE: 82 MMHG | HEART RATE: 70 BPM | BODY MASS INDEX: 23.49 KG/M2 | WEIGHT: 155 LBS | SYSTOLIC BLOOD PRESSURE: 143 MMHG

## 2024-11-13 DIAGNOSIS — M79.18 MYOFASCIAL PAIN SYNDROME: ICD-10-CM

## 2024-11-13 DIAGNOSIS — M54.2 NECK PAIN: ICD-10-CM

## 2024-11-13 DIAGNOSIS — M51.369 DEGENERATION OF INTERVERTEBRAL DISC OF LUMBAR REGION WITHOUT DISCOGENIC BACK PAIN OR LOWER EXTREMITY PAIN: ICD-10-CM

## 2024-11-13 DIAGNOSIS — G89.29 CHRONIC BILATERAL LOW BACK PAIN WITHOUT SCIATICA: ICD-10-CM

## 2024-11-13 DIAGNOSIS — M54.50 CHRONIC BILATERAL LOW BACK PAIN WITHOUT SCIATICA: ICD-10-CM

## 2024-11-13 DIAGNOSIS — G89.4 CHRONIC PAIN SYNDROME: Primary | ICD-10-CM

## 2024-11-13 DIAGNOSIS — M50.30 DDD (DEGENERATIVE DISC DISEASE), CERVICAL: ICD-10-CM

## 2024-11-13 PROCEDURE — 99204 OFFICE O/P NEW MOD 45 MIN: CPT | Performed by: STUDENT IN AN ORGANIZED HEALTH CARE EDUCATION/TRAINING PROGRAM

## 2024-11-13 NOTE — PROGRESS NOTES
"Assessment:  1. Chronic pain syndrome    2. Neck pain    3. Chronic bilateral low back pain without sciatica    4. Myofascial pain syndrome    5. DDD (degenerative disc disease), cervical    6. Degeneration of intervertebral disc of lumbar region without discogenic back pain or lower extremity pain      Portions of the record may have been created with voice recognition software. Occasional wrong word or \"sound a like\" substitutions may have occurred due to the inherent limitations of voice recognition software. Read the chart carefully and recognize, using context, where substitutions have occurred. Contact me with any questions.       Plan:  74-year-old female referred by PCP, here for initial evaluation of chronic diffuse upper/low back pain symptoms of a few months duration.  Patient had symptoms started after slipping and falling off of her bed.  She presented to the ER for the symptoms on 9/16/2024 where CT of head/cervical/lumbar spine was performed.  CT of cervical/lumbar spine showed multilevel DDD with variable canal and foraminal narrowing.  She denies radicular symptoms, paresthesias, near progressive weakness, saddle anesthesia, BBI.  She has been using OTC topical analgesics, Tylenol without significant relief.  She has also tried and failed tizanidine 2 mg as needed as prescribed by PCP.  She has not been to physical therapy for the symptoms.  Diffuse back pain symptoms likely secondary to myofascial pain, DDD.    Discussed option of trigger point injections for symptom management.    Recommend course of physical therapy for myofascial release, posture training, neck/core strengthening exercises.    Follow-up in 1 month after injection.          Complete risks and benefits including bleeding, infection, tissue reaction, nerve injury and allergic reaction were discussed. The approach was demonstrated using models and literature was provided. Verbal and written consent was obtained.      History of " Present Illness:    The patient is a 74 y.o. female who presents for consultation in regards to Back Pain (Lower and mid), Neck Pain, and Buttocks Pain.  Symptoms have been present for 3 months. Symptoms began after a fall at home. Pain is reported to be 5 on the numeric rating scale.  Symptoms are felt intermittently and worst in the morning.  Symptoms are characterized as throbbing.  Symptoms are associated with pain limited difficulty with function, ambulation.  Aggravating factors include standing and bending.  Relieving factors include walking.       Review of Systems:    Review of Systems   Constitutional:  Negative for chills and fever.   HENT:  Positive for hearing loss. Negative for ear pain, sinus pressure and sore throat.    Eyes:  Positive for visual disturbance. Negative for pain and redness.   Respiratory:  Negative for cough and wheezing.    Cardiovascular:  Negative for leg swelling.   Gastrointestinal:  Negative for abdominal pain and nausea.   Endocrine: Negative for polydipsia and polyuria.   Genitourinary:  Negative for difficulty urinating and frequency.   Musculoskeletal:  Positive for arthralgias, back pain, neck pain and neck stiffness. Negative for gait problem and myalgias.   Skin:  Negative for pallor and wound.   Neurological:  Positive for headaches. Negative for seizures and weakness.   Psychiatric/Behavioral:  Negative for decreased concentration and sleep disturbance. The patient is nervous/anxious.    All other systems reviewed and are negative.          Past Medical History:   Diagnosis Date    Anxiety     Cancer (HCC) 2009    Tonsils- followed by Oncology( Dr. pappas, Dr. Carrera)    Colitis 06/11/2024    Disease of thyroid gland        Past Surgical History:   Procedure Laterality Date    DENTAL SURGERY      EYE SURGERY      INCISION AND DRAINAGE OF WOUND Right 08/28/2021    Procedure: INCISION AND DRAINAGE (I&D) HEAD/FACE;  Surgeon: Jeferson Dunn DMD;  Location: [facility];   Service: Maxillofacial    MOUTH SURGERY      MULTIPLE TOOTH EXTRACTIONS Right 08/28/2021    Procedure: EXTRACTION TEETH 25,26,27;  Surgeon: Jeferson Dunn DMD;  Location: [facility];  Service: Maxillofacial    MYRINGOPLASTY      REFRACTIVE SURGERY Left 10/27/2023    TOE SURGERY      TONSILLECTOMY      Dr. Lester       Family History   Problem Relation Age of Onset    Cancer Mother     No Known Problems Father     Breast cancer Sister 30    No Known Problems Sister     No Known Problems Sister     No Known Problems Sister     No Known Problems Daughter     Diabetes Maternal Grandmother     No Known Problems Maternal Grandfather     No Known Problems Paternal Grandmother     No Known Problems Paternal Grandfather     No Known Problems Maternal Aunt     No Known Problems Maternal Aunt     Breast cancer additional onset Neg Hx     BRCA2 Negative Neg Hx     BRCA2 Positive Neg Hx     BRCA1 Positive Neg Hx     BRCA1 Negative Neg Hx     BRCA 1/2 Neg Hx     Ovarian cancer Neg Hx     Endometrial cancer Neg Hx     Colon cancer Neg Hx        Social History     Occupational History    Not on file   Tobacco Use    Smoking status: Former     Types: Cigarettes    Smokeless tobacco: Never   Vaping Use    Vaping status: Never Used   Substance and Sexual Activity    Alcohol use: Yes     Comment: occ    Drug use: Never    Sexual activity: Not on file         Current Outpatient Medications:     acetaminophen (TYLENOL) 325 mg tablet, Take 3 tablets (975 mg total) by mouth every 8 (eight) hours, Disp: , Rfl: 0    ascorbic acid (VITAMIN C) 1000 MG tablet, Take 1,000 mg by mouth daily, Disp: , Rfl:     aspirin (Aspirin 81) 81 mg EC tablet, Take 1 tablet (81 mg total) by mouth daily, Disp: , Rfl: 0    busPIRone (BUSPAR) 10 mg tablet, Take 0.5 tablets (5 mg total) by mouth 3 (three) times a day, Disp: 270 tablet, Rfl: 1    FLUoxetine 60 MG TABS, TAKE 1 TABLET BY MOUTH EVERY DAY, Disp: 90 tablet, Rfl: 1    fluticasone (FLONASE) 50 mcg/act  "nasal spray, SPRAY 2 SPRAYS INTO EACH NOSTRIL EVERY DAY, Disp: 48 mL, Rfl: 4    levothyroxine 100 mcg tablet, TAKE 1 TABLET BY MOUTH EVERY DAY, Disp: 90 tablet, Rfl: 1    lisinopril (ZESTRIL) 20 mg tablet, TAKE 1 TABLET BY MOUTH EVERY DAY, Disp: 90 tablet, Rfl: 1    LORazepam (ATIVAN) 2 mg tablet, Take 1 tablet (2 mg total) by mouth 2 (two) times a day as needed for anxiety, Disp: 60 tablet, Rfl: 5    Melatonin 10 MG TABS, Take by mouth once at bedtime, Disp: , Rfl:     pantoprazole (PROTONIX) 40 mg tablet, Take 1 tablet (40 mg total) by mouth daily, Disp: 90 tablet, Rfl: 3    tobramycin-dexamethasone (TOBRADEX) ophthalmic suspension, Place 4 drops in the affected ear two times a day for one week., Disp: 5 mL, Rfl: 0    TURMERIC PO, Take 1,000 mg by mouth daily, Disp: , Rfl:     vitamin B-12 (VITAMIN B-12) 500 mcg tablet, Take 5,000 mcg by mouth daily, Disp: , Rfl:     Vitamin D, Cholecalciferol, 50 MCG (2000 UT) CAPS, Take by mouth daily, Disp: , Rfl:     Multiple Vitamin (MULTIVITAMIN) tablet, Take 1 tablet by mouth daily (Patient not taking: Reported on 11/8/2024), Disp: , Rfl:     tiZANidine (ZANAFLEX) 2 mg tablet, TAKE 1 TABLET BY MOUTH EVERY 8 HOURS AS NEEDED FOR MUSCLE SPASM (Patient not taking: Reported on 11/13/2024), Disp: 270 tablet, Rfl: 1    No Known Allergies    Physical Exam:    /82   Pulse 70   Ht 5' 8\" (1.727 m)   Wt 70.3 kg (155 lb)   BMI 23.57 kg/m²     Constitutional: normal, well developed, well nourished, alert, in no distress and non-toxic and no overt pain behavior.  Eyes: anicteric  HEENT: grossly intact  Neck: supple, symmetric, trachea midline and no masses   Pulmonary:even and unlabored  Cardiovascular:No edema or pitting edema present  Skin:Normal without rashes or lesions and well hydrated  Psychiatric:Mood and affect appropriate  Neurologic:Cranial Nerves II-XII grossly intact  Musculoskeletal: slow gait, stooped posture, pain to palpation diffusely over cervical and lumbar " paraspinal musculature, limited cervical and lumbar ROM, grossly intact strength over all extremities      Imaging      CT LUMBAR SPINE WITHOUT CONTRAST     INDICATION:   injury. Fall with lower back pain.     COMPARISON: January 19, 2017 and abdomen/pelvis CT dated June 11, 2024     TECHNIQUE:  Contiguous axial images through the lumbar spine were obtained. Sagittal and coronal reconstructions were performed.     IV Contrast:   None     Radiation dose length product (DLP) for this visit:  1262 mGy-cm .  This examination, like all CT scans performed in the [facility], was performed utilizing techniques to minimize radiation dose exposure, including the use of iterative   reconstruction and automated exposure control.     IMAGE QUALITY:  Diagnostic.     FINDINGS:     ALIGNMENT:  There are 5 lumbar type vertebral bodies with a hypoplastic rib on the right at the L1 level. Numbering is consistent with prior exam. There is lordotic straightening with grade 1 anterolisthesis at L3-4, unchanged.     VERTEBRAE: Chronic appearing L3 superior end plate deformity is unchanged compared to prior abdomen pelvis CT. No acute fracture.  No lytic or blastic lesion.     DEGENERATIVE CHANGES:     Lower Thoracic spine:  Normal lower thoracic disc spaces.     Lumbar Spine:  L1-2: Disc bulge and mild facet arthropathy. No central canal stenosis or foraminal stenosis.     L2-3: Mild central stenosis due to disc bulge, mild facet arthropathy, and ligamentum flavum redundancy. These findings have progressed. There is no foraminal stenosis.     L3-4: Severe central stenosis due to severe facet arthropathy, disc bulge, grade 1 anterolisthesis, and ligamentum flavum redundancy. Similar factors mildly efface the foramen. This level is unchanged.     L4-5: Moderate central stenosis due to disc bulge, moderate facet arthropathy, and ligamentum flavum redundancy. Left foraminal extrusion results in moderate left foraminal  stenosis. There is no right foraminal stenosis. There is a vacuum disc at this   level. This level appears similar in the interim.     L5-S1: Mild central stenosis due to disc bulge, moderate right and mild left facet arthropathy, and ligamentum flavum redundancy. Disc bulge and right foraminal marginal osteophytes result in moderate right foraminal stenosis, previously mild. There is   no left foraminal stenosis. Vacuum disc is noted at this level.     PARASPINAL SOFT TISSUES:  Normal.     OTHER: Unremarkable     IMPRESSION:     1. Worsening spondylosis at L2-L3 with now mild central stenosis.  2. Worsening, moderate right foraminal stenosis at L5-S1 due to disc bulge and facet/marginal osteophytes.  3. Spondylosis is otherwise unchanged with severe central stenosis at L3-L4 and moderate central stenosis at L4-L5.  4. Chronic L3 superior endplate fracture. No acute fracture seen.        CT CERVICAL SPINE - WITHOUT CONTRAST     INDICATION:   fall.     COMPARISON: CT angiogram dated August 7, 2024.     TECHNIQUE:  CT examination of the cervical spine was performed without intravenous contrast.  Contiguous axial images were obtained. Multiplanar 2D reformatted images were created from the source data.     Radiation dose length product (DLP) for this visit:  337 mGy-cm .  This examination, like all CT scans performed in the [facility], was performed utilizing techniques to minimize radiation dose exposure, including the use of iterative   reconstruction and automated exposure control.     IMAGE QUALITY:  Diagnostic.     FINDINGS:     ALIGNMENT:  There is straightening of normal cervical lordosis.  No subluxation or compression deformity.     VERTEBRAE:  No fracture.     DEGENERATIVE CHANGES:  Mild multilevel cervical degenerative changes are noted without critical central canal stenosis.     PREVERTEBRAL AND PARASPINAL SOFT TISSUES: Chronic right mastoid opacification is noted.     THORACIC INLET:   Normal.     IMPRESSION:     No cervical spine fracture or traumatic malalignment.           Orders Placed This Encounter   Procedures    Ambulatory referral to Physical Therapy

## 2024-11-16 DIAGNOSIS — F41.8 DEPRESSION WITH ANXIETY: ICD-10-CM

## 2024-11-18 RX ORDER — LORAZEPAM 2 MG/1
2 TABLET ORAL 2 TIMES DAILY PRN
Qty: 60 TABLET | Refills: 5 | Status: SHIPPED | OUTPATIENT
Start: 2024-11-18

## 2024-11-18 NOTE — TELEPHONE ENCOUNTER
Patient requesting refill(s) of: Ativan 2 mg     Last filled: 5/2/24  Last appt:11/8/24  Next appt:5/8/25  Pharmacy: [facility]

## 2024-11-25 ENCOUNTER — TELEPHONE (OUTPATIENT)
Age: 74
End: 2024-11-25

## 2024-11-25 NOTE — TELEPHONE ENCOUNTER
Caller: dean    Doctor: kim    Reason for call: pt wants to cx her procedure for now.    Call back#: [phone]

## 2024-12-03 ENCOUNTER — OFFICE VISIT (OUTPATIENT)
Dept: FAMILY MEDICINE CLINIC | Facility: CLINIC | Age: 74
End: 2024-12-03
Payer: COMMERCIAL

## 2024-12-03 VITALS
HEIGHT: 68 IN | SYSTOLIC BLOOD PRESSURE: 138 MMHG | TEMPERATURE: 98.8 F | BODY MASS INDEX: 23.49 KG/M2 | WEIGHT: 155 LBS | HEART RATE: 68 BPM | OXYGEN SATURATION: 96 % | DIASTOLIC BLOOD PRESSURE: 78 MMHG | RESPIRATION RATE: 18 BRPM

## 2024-12-03 DIAGNOSIS — J02.9 SORE THROAT: ICD-10-CM

## 2024-12-03 DIAGNOSIS — B96.89 BACTERIAL URI: Primary | ICD-10-CM

## 2024-12-03 DIAGNOSIS — J06.9 BACTERIAL URI: Primary | ICD-10-CM

## 2024-12-03 LAB
SARS-COV-2 AG UPPER RESP QL IA: NEGATIVE
VALID CONTROL: NORMAL

## 2024-12-03 PROCEDURE — 87811 SARS-COV-2 COVID19 W/OPTIC: CPT | Performed by: NURSE PRACTITIONER

## 2024-12-03 PROCEDURE — G2211 COMPLEX E/M VISIT ADD ON: HCPCS | Performed by: NURSE PRACTITIONER

## 2024-12-03 PROCEDURE — 99213 OFFICE O/P EST LOW 20 MIN: CPT | Performed by: NURSE PRACTITIONER

## 2024-12-03 RX ORDER — AZITHROMYCIN 250 MG/1
TABLET, FILM COATED ORAL
Qty: 6 TABLET | Refills: 0 | Status: SHIPPED | OUTPATIENT
Start: 2024-12-03 | End: 2024-12-07

## 2024-12-03 RX ORDER — PREDNISONE 20 MG/1
20 TABLET ORAL 2 TIMES DAILY WITH MEALS
Qty: 10 TABLET | Refills: 0 | Status: SHIPPED | OUTPATIENT
Start: 2024-12-03 | End: 2024-12-08

## 2024-12-03 NOTE — PROGRESS NOTES
Name: Radha Saini      : 1950      MRN: [id number]  Encounter Provider: MARY Gastelum  Encounter Date: 12/3/2024   Encounter department: [facility] PRIMARY [facility]ARE  :  Assessment & Plan  Sore throat    Orders:    POCT Rapid Covid Ag    Bacterial URI    Orders:    predniSONE 20 mg tablet; Take 1 tablet (20 mg total) by mouth 2 (two) times a day with meals for 5 days    azithromycin (ZITHROMAX) 250 mg tablet; Take 2 tablets today then 1 tablet daily x 4 days          Depression Screening and Follow-up Plan: Patient was screened for depression during today's encounter. They screened negative with a PHQ-9 score of 0.      History of Present Illness     Here for cold symptoms- headache, left ear draining (had been using drops from ENT), sore/scratchy throat, chills/warm- symptoms for 2 days. Took a covid test at home- had a faint positive line      Review of Systems   Constitutional:  Positive for chills. Negative for activity change, diaphoresis, fatigue and fever.   HENT:  Positive for congestion, ear discharge (left ear), rhinorrhea and sore throat. Negative for facial swelling, hearing loss, sinus pressure, sinus pain, sneezing and voice change.    Eyes:  Negative for discharge and visual disturbance.   Respiratory:  Negative for cough, choking, chest tightness, shortness of breath, wheezing and stridor.    Cardiovascular:  Negative for chest pain, palpitations and leg swelling.   Gastrointestinal:  Negative for abdominal distention, abdominal pain, constipation, diarrhea, nausea and vomiting.   Endocrine: Negative for polydipsia, polyphagia and polyuria.   Genitourinary:  Negative for difficulty urinating, dysuria, frequency and urgency.   Musculoskeletal:  Negative for arthralgias, back pain, gait problem, joint swelling, myalgias, neck pain and neck stiffness.   Skin:  Negative for color change, rash and wound.   Neurological:  Positive for headaches. Negative for dizziness, syncope, speech  "difficulty, weakness and light-headedness.   Hematological:  Negative for adenopathy. Does not bruise/bleed easily.   Psychiatric/Behavioral:  Negative for agitation, behavioral problems, confusion, hallucinations, sleep disturbance and suicidal ideas. The patient is not nervous/anxious.           Objective   /78   Pulse 68   Temp 98.8 °F (37.1 °C)   Resp 18   Ht 5' 8\" (1.727 m)   Wt 70.3 kg (155 lb)   SpO2 96%   BMI 23.57 kg/m²      Physical Exam  Vitals and nursing note reviewed.   Constitutional:       General: She is not in acute distress.     Appearance: She is well-developed. She is not diaphoretic.   HENT:      Head: Normocephalic and atraumatic.      Right Ear: Ear canal and external ear normal. A middle ear effusion is present. Tympanic membrane is not erythematous.      Left Ear: Ear canal and external ear normal. A middle ear effusion is present. Tympanic membrane is perforated (chronic). Tympanic membrane is not erythematous.      Nose: Congestion and rhinorrhea present.      Mouth/Throat:      Mouth: Mucous membranes are moist.      Pharynx: Oropharynx is clear. Uvula midline.   Eyes:      General:         Right eye: No discharge.         Left eye: No discharge.      Conjunctiva/sclera: Conjunctivae normal.   Neck:      Thyroid: No thyromegaly.   Cardiovascular:      Rate and Rhythm: Normal rate and regular rhythm.      Heart sounds: Normal heart sounds. No murmur heard.     No friction rub. No gallop.   Pulmonary:      Effort: Pulmonary effort is normal. No respiratory distress.      Breath sounds: Normal breath sounds. No wheezing or rales.   Musculoskeletal:         General: No tenderness or deformity. Normal range of motion.      Cervical back: Normal range of motion and neck supple.   Lymphadenopathy:      Cervical: No cervical adenopathy.   Skin:     General: Skin is warm and dry.      Findings: No erythema or rash.   Neurological:      Mental Status: She is alert and oriented to " person, place, and time.      Cranial Nerves: No cranial nerve deficit.      Coordination: Coordination normal.   Psychiatric:         Mood and Affect: Mood normal.         Behavior: Behavior normal.         Thought Content: Thought content normal.         Judgment: Judgment normal.

## 2024-12-26 DIAGNOSIS — F41.9 ANXIETY: ICD-10-CM

## 2024-12-27 RX ORDER — BUSPIRONE HYDROCHLORIDE 10 MG/1
TABLET ORAL
Qty: 45 TABLET | Refills: 5 | Status: SHIPPED | OUTPATIENT
Start: 2024-12-27

## 2024-12-30 ENCOUNTER — ANESTHESIA EVENT (OUTPATIENT)
Dept: GASTROENTEROLOGY | Facility: HOSPITAL | Age: 74
End: 2024-12-30
Payer: COMMERCIAL

## 2024-12-30 ENCOUNTER — ANESTHESIA (OUTPATIENT)
Dept: GASTROENTEROLOGY | Facility: HOSPITAL | Age: 74
End: 2024-12-30
Payer: COMMERCIAL

## 2024-12-30 ENCOUNTER — HOSPITAL ENCOUNTER (OUTPATIENT)
Dept: GASTROENTEROLOGY | Facility: HOSPITAL | Age: 74
Setting detail: OUTPATIENT SURGERY
Discharge: HOME | End: 2024-12-30
Attending: STUDENT IN AN ORGANIZED HEALTH CARE EDUCATION/TRAINING PROGRAM
Payer: COMMERCIAL

## 2024-12-30 VITALS
DIASTOLIC BLOOD PRESSURE: 57 MMHG | SYSTOLIC BLOOD PRESSURE: 104 MMHG | TEMPERATURE: 98.5 F | HEART RATE: 85 BPM | OXYGEN SATURATION: 96 % | RESPIRATION RATE: 18 BRPM

## 2024-12-30 DIAGNOSIS — K55.9 ISCHEMIC COLITIS (HCC): ICD-10-CM

## 2024-12-30 DIAGNOSIS — K92.1 HEMATOCHEZIA: ICD-10-CM

## 2024-12-30 DIAGNOSIS — R93.3 ABNORMAL CT SCAN, COLON: ICD-10-CM

## 2024-12-30 PROCEDURE — 88305 TISSUE EXAM BY PATHOLOGIST: CPT | Performed by: PATHOLOGY

## 2024-12-30 PROCEDURE — 45385 COLONOSCOPY W/LESION REMOVAL: CPT | Performed by: STUDENT IN AN ORGANIZED HEALTH CARE EDUCATION/TRAINING PROGRAM

## 2024-12-30 RX ORDER — PROPOFOL 10 MG/ML
INJECTION, EMULSION INTRAVENOUS AS NEEDED
Status: DISCONTINUED | OUTPATIENT
Start: 2024-12-30 | End: 2024-12-30

## 2024-12-30 RX ORDER — SODIUM CHLORIDE, SODIUM LACTATE, POTASSIUM CHLORIDE, CALCIUM CHLORIDE 600; 310; 30; 20 MG/100ML; MG/100ML; MG/100ML; MG/100ML
125 INJECTION, SOLUTION INTRAVENOUS CONTINUOUS
Status: DISCONTINUED | OUTPATIENT
Start: 2024-12-30 | End: 2025-01-03 | Stop reason: HOSPADM

## 2024-12-30 RX ADMIN — PROPOFOL 90 MG: 10 INJECTION, EMULSION INTRAVENOUS at 07:32

## 2024-12-30 RX ADMIN — SODIUM CHLORIDE, SODIUM LACTATE, POTASSIUM CHLORIDE, AND CALCIUM CHLORIDE 125 ML/HR: .6; .31; .03; .02 INJECTION, SOLUTION INTRAVENOUS at 06:43

## 2024-12-30 RX ADMIN — PROPOFOL 120 MCG/KG/MIN: 10 INJECTION, EMULSION INTRAVENOUS at 07:33

## 2024-12-30 NOTE — ANESTHESIA POSTPROCEDURE EVALUATION
Post-Op Assessment Note    CV Status:  Stable    Pain management: adequate       Mental Status:  Sleepy   Hydration Status:  Euvolemic   PONV Controlled:  Controlled   Airway Patency:  Patent  Two or more mitigation strategies used for obstructive sleep apnea   Post Op Vitals Reviewed: Yes    No anethesia notable event occurred.    Staff: Anesthesiologist, CRNA           Last Filed PACU Vitals:  Vitals Value Taken Time   Temp 98    Pulse 75    /60    Resp 18    SpO2 99        Modified Jinny:  No data recorded

## 2024-12-30 NOTE — H&P
History and Physical -  [facility]  Radha Saini 74 y.o. female MRN: [id number]                  HPI: Radha Saini is a 74 y.o. year old female who presents for hematochezia, ischemic colitis.      REVIEW OF SYSTEMS: Per the HPI, and otherwise unremarkable.    Historical Information   Past Medical History:   Diagnosis Date    Anxiety     Cancer (HCC) 2009    Tonsils- followed by Oncology( Dr. pappas, Dr. Carrera)    Colitis 06/11/2024    Disease of thyroid gland      Past Surgical History:   Procedure Laterality Date    DENTAL SURGERY      EYE SURGERY      INCISION AND DRAINAGE OF WOUND Right 08/28/2021    Procedure: INCISION AND DRAINAGE (I&D) HEAD/FACE;  Surgeon: Jeferson Dunn DMD;  Location: [facility];  Service: Maxillofacial    MOUTH SURGERY      MULTIPLE TOOTH EXTRACTIONS Right 08/28/2021    Procedure: EXTRACTION TEETH 25,26,27;  Surgeon: Jeferson Dunn DMD;  Location: [facility];  Service: Maxillofacial    MYRINGOPLASTY      REFRACTIVE SURGERY Left 10/27/2023    TOE SURGERY      TONSILLECTOMY      Dr. Lester     Social History   Social History     Substance and Sexual Activity   Alcohol Use Yes    Comment: occ     Social History     Substance and Sexual Activity   Drug Use Never     Social History     Tobacco Use   Smoking Status Former    Types: Cigarettes   Smokeless Tobacco Never     Family History   Problem Relation Age of Onset    Cancer Mother     No Known Problems Father     Breast cancer Sister 30    No Known Problems Sister     No Known Problems Sister     No Known Problems Sister     No Known Problems Daughter     Diabetes Maternal Grandmother     No Known Problems Maternal Grandfather     No Known Problems Paternal Grandmother     No Known Problems Paternal Grandfather     No Known Problems Maternal Aunt     No Known Problems Maternal Aunt     Breast cancer additional onset Neg Hx     BRCA2 Negative Neg Hx     BRCA2 Positive Neg Hx     BRCA1 Positive Neg Hx     BRCA1 Negative  Neg Hx     BRCA 1/2 Neg Hx     Ovarian cancer Neg Hx     Endometrial cancer Neg Hx     Colon cancer Neg Hx        Meds/Allergies       Current Outpatient Medications:     acetaminophen (TYLENOL) 325 mg tablet    ascorbic acid (VITAMIN C) 1000 MG tablet    aspirin (Aspirin 81) 81 mg EC tablet    busPIRone (BUSPAR) 10 mg tablet    FLUoxetine 60 MG TABS    fluticasone (FLONASE) 50 mcg/act nasal spray    levothyroxine 100 mcg tablet    lisinopril (ZESTRIL) 20 mg tablet    LORazepam (ATIVAN) 2 mg tablet    Melatonin 10 MG TABS    Multiple Vitamin (MULTIVITAMIN) tablet    pantoprazole (PROTONIX) 40 mg tablet    TURMERIC PO    vitamin B-12 (VITAMIN B-12) 500 mcg tablet    Vitamin D, Cholecalciferol, 50 MCG (2000 UT) CAPS    tiZANidine (ZANAFLEX) 2 mg tablet    tobramycin-dexamethasone (TOBRADEX) ophthalmic suspension    Current Facility-Administered Medications:     lactated ringers infusion, 125 mL/hr, Intravenous, Continuous, 125 mL/hr at 12/30/24 0643    No Known Allergies    Objective     /73   Pulse 92   Temp 97.6 °F (36.4 °C) (Temporal)   Resp 18   SpO2 98%       PHYSICAL EXAM    Gen: NAD  Head: NCAT  CV: RRR  CHEST: Clear  ABD: soft, NT/ND  EXT: no edema      ASSESSMENT/PLAN:  This is a 74 y.o. year old female here for colonoscopy, and she is stable and optimized for her procedure.

## 2024-12-30 NOTE — ANESTHESIA PREPROCEDURE EVALUATION
COLONOSCOPY    Had prior colonoscopy many years ago and teeth extraction procedure under [facility] in 2021 with no anesthesia complications   Relevant Problems   CARDIO   (+) Dissection of carotid artery (HCC)   (+) Essential hypertension      ENDO   (+) Acquired hypothyroidism      NEURO/PSYCH   (+) Anxiety   (+) Depression with anxiety   (+) Depression, recurrent (HCC)        Physical Exam    Airway    Mallampati score: I  TM Distance: >3 FB  Neck ROM: limited     Dental        Cardiovascular  Cardiovascular exam normal    Pulmonary  Pulmonary exam normal     Other Findings  post-pubertal.      Anesthesia Plan  ASA Score- 3     Anesthesia Type- IV sedation with anesthesia with ASA Monitors.         Additional Monitors:     Airway Plan:            Plan Factors-Exercise tolerance (METS): >4 METS.    Chart reviewed. EKG reviewed.  Existing labs reviewed. Patient summary reviewed.    Patient is not a current smoker.              Induction- intravenous.    Postoperative Plan-         Informed Consent- Anesthetic plan and risks discussed with patient.  I personally reviewed this patient with the CRNA. Discussed and agreed on the Anesthesia Plan with the CRNA..

## 2025-01-02 ENCOUNTER — RESULTS FOLLOW-UP (OUTPATIENT)
Dept: GASTROENTEROLOGY | Facility: CLINIC | Age: 75
End: 2025-01-02

## 2025-01-02 PROCEDURE — 88305 TISSUE EXAM BY PATHOLOGIST: CPT | Performed by: PATHOLOGY

## 2025-01-06 DIAGNOSIS — M79.662 PAIN OF LEFT CALF: ICD-10-CM

## 2025-01-09 RX ORDER — TIZANIDINE 2 MG/1
TABLET ORAL
Qty: 270 TABLET | Refills: 1 | Status: SHIPPED | OUTPATIENT
Start: 2025-01-09

## 2025-01-29 DIAGNOSIS — E03.9 ACQUIRED HYPOTHYROIDISM: ICD-10-CM

## 2025-01-29 RX ORDER — LEVOTHYROXINE SODIUM 100 UG/1
100 TABLET ORAL DAILY
Qty: 90 TABLET | Refills: 1 | Status: SHIPPED | OUTPATIENT
Start: 2025-01-29

## 2025-02-09 DIAGNOSIS — F41.9 ANXIETY: ICD-10-CM

## 2025-02-10 RX ORDER — BUSPIRONE HYDROCHLORIDE 10 MG/1
5 TABLET ORAL 3 TIMES DAILY
Qty: 135 TABLET | Refills: 1 | Status: SHIPPED | OUTPATIENT
Start: 2025-02-10

## 2025-04-26 DIAGNOSIS — I10 HYPERTENSION, UNSPECIFIED TYPE: ICD-10-CM

## 2025-04-28 RX ORDER — LISINOPRIL 20 MG/1
20 TABLET ORAL DAILY
Qty: 90 TABLET | Refills: 1 | Status: SHIPPED | OUTPATIENT
Start: 2025-04-28

## 2025-05-08 ENCOUNTER — OFFICE VISIT (OUTPATIENT)
Dept: FAMILY MEDICINE CLINIC | Facility: CLINIC | Age: 75
End: 2025-05-08
Payer: COMMERCIAL

## 2025-05-08 VITALS
RESPIRATION RATE: 16 BRPM | HEIGHT: 68 IN | DIASTOLIC BLOOD PRESSURE: 72 MMHG | SYSTOLIC BLOOD PRESSURE: 106 MMHG | WEIGHT: 155 LBS | BODY MASS INDEX: 23.49 KG/M2 | HEART RATE: 76 BPM | OXYGEN SATURATION: 96 % | TEMPERATURE: 97.8 F

## 2025-05-08 DIAGNOSIS — E03.9 ACQUIRED HYPOTHYROIDISM: ICD-10-CM

## 2025-05-08 DIAGNOSIS — C09.9 SQUAMOUS CELL CARCINOMA OF RIGHT TONSIL (HCC): ICD-10-CM

## 2025-05-08 DIAGNOSIS — F41.8 DEPRESSION WITH ANXIETY: ICD-10-CM

## 2025-05-08 DIAGNOSIS — Z12.31 ENCOUNTER FOR SCREENING MAMMOGRAM FOR BREAST CANCER: ICD-10-CM

## 2025-05-08 DIAGNOSIS — Z00.00 ENCOUNTER FOR MEDICARE ANNUAL WELLNESS EXAM: Primary | ICD-10-CM

## 2025-05-08 DIAGNOSIS — I10 ESSENTIAL HYPERTENSION: ICD-10-CM

## 2025-05-08 DIAGNOSIS — J30.89 SEASONAL ALLERGIC RHINITIS DUE TO OTHER ALLERGIC TRIGGER: ICD-10-CM

## 2025-05-08 PROBLEM — J30.9 ALLERGIC RHINITIS DUE TO ALLERGEN: Status: ACTIVE | Noted: 2025-05-08

## 2025-05-08 PROCEDURE — G0439 PPPS, SUBSEQ VISIT: HCPCS | Performed by: NURSE PRACTITIONER

## 2025-05-08 PROCEDURE — 99214 OFFICE O/P EST MOD 30 MIN: CPT | Performed by: NURSE PRACTITIONER

## 2025-05-08 PROCEDURE — G2211 COMPLEX E/M VISIT ADD ON: HCPCS | Performed by: NURSE PRACTITIONER

## 2025-05-08 RX ORDER — FLUOXETINE 20 MG/1
40 TABLET, FILM COATED ORAL DAILY
Qty: 180 TABLET | Refills: 0 | Status: SHIPPED | OUTPATIENT
Start: 2025-05-08 | End: 2025-05-09

## 2025-05-08 NOTE — PATIENT INSTRUCTIONS
Decrease Lorazepam to 1mg dose when needed and at bedtime to see if this improves morning motivation  Decrease Fluoxetine from 60mg to 40mg daily, irene in 6 weeks to determine any positive or negative changes    Medicare Preventive Visit Patient Instructions  Thank you for completing your Welcome to Medicare Visit or Medicare Annual Wellness Visit today. Your next wellness visit will be due in one year (5/9/2026).  The screening/preventive services that you may require over the next 5-10 years are detailed below. Some tests may not apply to you based off risk factors and/or age. Screening tests ordered at today's visit but not completed yet may show as past due. Also, please note that scanned in results may not display below.  Preventive Screenings:  Service Recommendations Previous Testing/Comments   Colorectal Cancer Screening  * Colonoscopy    * Fecal Occult Blood Test (FOBT)/Fecal Immunochemical Test (FIT)  * Fecal DNA/Cologuard Test  * Flexible Sigmoidoscopy Age: 45-75 years old   Colonoscopy: every 10 years (may be performed more frequently if at higher risk)  OR  FOBT/FIT: every 1 year  OR  Cologuard: every 3 years  OR  Sigmoidoscopy: every 5 years  Screening may be recommended earlier than age 45 if at higher risk for colorectal cancer. Also, an individualized decision between you and your healthcare provider will decide whether screening between the ages of 76-85 would be appropriate. Colonoscopy: 12/30/2024  FOBT/FIT: Not on file  Cologuard: 10/20/2022  Sigmoidoscopy: Not on file    Screening Current     Breast Cancer Screening Age: 40+ years old  Frequency: every 1-2 years  Not required if history of left and right mastectomy Mammogram: 02/05/2024    Screening Current   Cervical Cancer Screening Between the ages of 21-29, pap smear recommended once every 3 years.   Between the ages of 30-65, can perform pap smear with HPV co-testing every 5 years.   Recommendations may differ for women with a history  of total hysterectomy, cervical cancer, or abnormal pap smears in past. Pap Smear: Not on file    Screening Not Indicated   Hepatitis C Screening Once for adults born between 1945 and 1965  More frequently in patients at high risk for Hepatitis C Hep C Antibody: 02/24/2020    Screening Current   Diabetes Screening 1-2 times per year if you're at risk for diabetes or have pre-diabetes Fasting glucose: 94 mg/dL (10/30/2024)  A1C: No results in last 5 years (No results in last 5 years)  Screening Current   Cholesterol Screening Once every 5 years if you don't have a lipid disorder. May order more often based on risk factors. Lipid panel: 10/30/2024    Screening Current     Other Preventive Screenings Covered by Medicare:  Abdominal Aortic Aneurysm (AAA) Screening: covered once if your at risk. You're considered to be at risk if you have a family history of AAA.  Lung Cancer Screening: covers low dose CT scan once per year if you meet all of the following conditions: (1) Age 55-77; (2) No signs or symptoms of lung cancer; (3) Current smoker or have quit smoking within the last 15 years; (4) You have a tobacco smoking history of at least 20 pack years (packs per day multiplied by number of years you smoked); (5) You get a written order from a healthcare provider.  Glaucoma Screening: covered annually if you're considered high risk: (1) You have diabetes OR (2) Family history of glaucoma OR (3)  aged 50 and older OR (4)  American aged 65 and older  Osteoporosis Screening: covered every 2 years if you meet one of the following conditions: (1) You're estrogen deficient and at risk for osteoporosis based off medical history and other findings; (2) Have a vertebral abnormality; (3) On glucocorticoid therapy for more than 3 months; (4) Have primary hyperparathyroidism; (5) On osteoporosis medications and need to assess response to drug therapy.   Last bone density test (DXA Scan): 02/05/2024.  HIV  Screening: covered annually if you're between the age of 15-65. Also covered annually if you are younger than 15 and older than 65 with risk factors for HIV infection. For pregnant patients, it is covered up to 3 times per pregnancy.    Immunizations:  Immunization Recommendations   Influenza Vaccine Annual influenza vaccination during flu season is recommended for all persons aged >= 6 months who do not have contraindications   Pneumococcal Vaccine   * Pneumococcal conjugate vaccine = PCV13 (Prevnar 13), PCV15 (Vaxneuvance), PCV20 (Prevnar 20)  * Pneumococcal polysaccharide vaccine = PPSV23 (Pneumovax) Adults 19-65 yo with certain risk factors or if 65+ yo  If never received any pneumonia vaccine: recommend Prevnar 20 (PCV20)  Give PCV20 if previously received 1 dose of PCV13 or PPSV23   Hepatitis B Vaccine 3 dose series if at intermediate or high risk (ex: diabetes, end stage renal disease, liver disease)   Respiratory syncytial virus (RSV) Vaccine - COVERED BY MEDICARE PART D  * RSVPreF3 (Arexvy) CDC recommends that adults 60 years of age and older may receive a single dose of RSV vaccine using shared clinical decision-making (SCDM)   Tetanus (Td) Vaccine - COST NOT COVERED BY MEDICARE PART B Following completion of primary series, a booster dose should be given every 10 years to maintain immunity against tetanus. Td may also be given as tetanus wound prophylaxis.   Tdap Vaccine - COST NOT COVERED BY MEDICARE PART B Recommended at least once for all adults. For pregnant patients, recommended with each pregnancy.   Shingles Vaccine (Shingrix) - COST NOT COVERED BY MEDICARE PART B  2 shot series recommended in those 19 years and older who have or will have weakened immune systems or those 50 years and older     Health Maintenance Due:      Topic Date Due    Breast Cancer Screening: Mammogram  02/05/2025    Colorectal Cancer Screening  12/30/2027    Hepatitis C Screening  Completed     Immunizations Due:      Topic  Date Due    COVID-19 Vaccine (3 - 2024-25 season) 09/01/2024     Advance Directives   What are advance directives?  Advance directives are legal documents that state your wishes and plans for medical care. These plans are made ahead of time in case you lose your ability to make decisions for yourself. Advance directives can apply to any medical decision, such as the treatments you want, and if you want to donate organs.   What are the types of advance directives?  There are many types of advance directives, and each state has rules about how to use them. You may choose a combination of any of the following:  Living will:  This is a written record of the treatment you want. You can also choose which treatments you do not want, which to limit, and which to stop at a certain time. This includes surgery, medicine, IV fluid, and tube feedings.   Durable power of  for healthcare (DPAHC):  This is a written record that states who you want to make healthcare choices for you when you are unable to make them for yourself. This person, called a proxy, is usually a family member or a friend. You may choose more than 1 proxy.  Do not resuscitate (DNR) order:  A DNR order is used in case your heart stops beating or you stop breathing. It is a request not to have certain forms of treatment, such as CPR. A DNR order may be included in other types of advance directives.  Medical directive:  This covers the care that you want if you are in a coma, near death, or unable to make decisions for yourself. You can list the treatments you want for each condition. Treatment may include pain medicine, surgery, blood transfusions, dialysis, IV or tube feedings, and a ventilator (breathing machine).  Values history:  This document has questions about your views, beliefs, and how you feel and think about life. This information can help others choose the care that you would choose.  Why are advance directives important?  An advance  directive helps you control your care. Although spoken wishes may be used, it is better to have your wishes written down. Spoken wishes can be misunderstood, or not followed. Treatments may be given even if you do not want them. An advance directive may make it easier for your family to make difficult choices about your care.       © Copyright [employer] 2018 Information is for End User's use only and may not be sold, redistributed or otherwise used for commercial purposes. All illustrations and images included in CareNotes® are the copyrighted property of [employer] or [employer]

## 2025-05-08 NOTE — PROGRESS NOTES
Name: Radha Saini      : 1950      MRN: [id number]  Encounter Provider: MARY Gastelum  Encounter Date: 2025   Encounter department: 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 Personalized health advice and appropriate referrals for health education or preventive services given if needed, as noted in patient's After Visit Summary.    History of Present Illness     Would like to discuss lack of motivation- denies suicidal thoughts. She reports she continues with anxiety when grandkids visit- she takes Lorazepam 2mg at bedtime and when grandkids come over (3-4 times a week). She is agreeable to cut Lorazepam in 1/2 at bedtime and during the day when needed- will trial 1mg. She is agreeable to decrease Prozac from 60mg to 40mg daily, medcheck in 6 weeks. We discussed that her allergies may also be worsening her fatigue/ambition.     Fatigue  Associated symptoms include congestion and fatigue. Pertinent negatives include no abdominal pain, arthralgias, chest pain, coughing, diaphoresis, fever, headaches, joint swelling, myalgias, nausea, neck pain, rash, sore throat, vomiting or weakness.      Patient Care Team:  MARY Gastelum as PCP - General (Family Medicine)  Petar Bess DO (Gastroenterology)  Kandi Vargas MD (Gastroenterology)    Review of Systems   Constitutional:   Positive for activity change and fatigue. Negative for diaphoresis and fever.   HENT:  Positive for congestion, postnasal drip and rhinorrhea. Negative for facial swelling, hearing loss, sinus pressure, sinus pain, sneezing, sore throat and voice change.    Eyes:  Negative for discharge and visual disturbance.   Respiratory:  Negative for cough, choking, chest tightness, shortness of breath, wheezing and stridor.    Cardiovascular:  Negative for chest pain, palpitations and leg swelling.   Gastrointestinal:  Negative for abdominal distention, abdominal pain, constipation, diarrhea, nausea and vomiting.   Endocrine: Negative for polydipsia, polyphagia and polyuria.   Genitourinary:  Negative for difficulty urinating, dysuria, frequency and urgency.   Musculoskeletal:  Negative for arthralgias, back pain, gait problem, joint swelling, myalgias, neck pain and neck stiffness.   Skin:  Negative for color change, rash and wound.   Neurological:  Negative for dizziness, syncope, speech difficulty, weakness, light-headedness and headaches.   Hematological:  Negative for adenopathy. Does not bruise/bleed easily.   Psychiatric/Behavioral:  Positive for dysphoric mood and sleep disturbance. Negative for agitation, behavioral problems, confusion, hallucinations and suicidal ideas. The patient is nervous/anxious.      Medical History Reviewed by provider this encounter:  Tobacco  Allergies  Meds  Problems  Med Hx  Surg Hx  Fam Hx       Annual Wellness Visit Questionnaire   Radha is here for her Subsequent Wellness visit.     Health Risk Assessment:   Patient rates overall health as good. Patient feels that their physical health rating is same. Patient is satisfied with their life. Eyesight was rated as same. Hearing was rated as same. Patient feels that their emotional and mental health rating is same. Patients states they are never, rarely angry. Patient states they are often unusually tired/fatigued. Pain experienced in  the last 7 days has been none. Patient states that she has experienced no weight loss or gain in last 6 months.     Fall Risk Screening:   In the past year, patient has experienced: no history of falling in past year      Home Safety:  Patient does not have trouble with stairs inside or outside of their home. Patient has working smoke alarms and has working carbon monoxide detector. Home safety hazards include: none.     Medications:   Patient is not currently taking any over-the-counter supplements. Patient is able to manage medications.     Activities of Daily Living (ADLs)/Instrumental Activities of Daily Living (IADLs):   Walk and transfer into and out of bed and chair?: Yes  Dress and groom yourself?: Yes    Bathe or shower yourself?: Yes    Feed yourself? Yes  Do your laundry/housekeeping?: Yes  Manage your money, pay your bills and track your expenses?: Yes  Make your own meals?: Yes    Do your own shopping?: Yes    Previous Hospitalizations:   Any hospitalizations or ED visits within the last 12 months?: No      Advance Care Planning:   Living will: Yes    Advanced directive: Yes      Preventive Screenings      Cardiovascular Screening:    General: Screening Current    Due for: Lipid Panel      Diabetes Screening:     General: Screening Current    Due for: Blood Glucose      Colorectal Cancer Screening:     General: Screening Current      Breast Cancer Screening:     General: Screening Current      Cervical Cancer Screening:    General: Screening Not Indicated      Lung Cancer Screening:     General: Screening Not Indicated      Hepatitis C Screening:    General: Screening Current    Screening, Brief Intervention, and Referral to Treatment (SBIRT)     Screening  Typical number of drinks in a day: 0  Typical number of drinks in a week: 1  Interpretation: Low risk drinking behavior.    Social Drivers of Health     Financial Resource Strain: Low Risk  (5/1/2023)    Overall Financial Resource Strain (CARDIA)      "Difficulty of Paying Living Expenses: Not very hard   Food Insecurity: No Food Insecurity (5/8/2025)    Hunger Vital Sign     Worried About Running Out of Food in the Last Year: Never true     Ran Out of Food in the Last Year: Never true   Transportation Needs: No Transportation Needs (5/8/2025)    PRAPARE - Transportation     Lack of Transportation (Medical): No     Lack of Transportation (Non-Medical): No   Housing Stability: Low Risk  (5/8/2025)    Housing Stability Vital Sign     Unable to Pay for Housing in the Last Year: No     Number of Times Moved in the Last Year: 0     Homeless in the Last Year: No   Utilities: Not At Risk (5/8/2025)    [facility] Utilities     Threatened with loss of utilities: No     No results found.    Objective   /72   Pulse 76   Temp 97.8 °F (36.6 °C) (Temporal)   Resp 16   Ht 5' 8\" (1.727 m)   Wt 70.3 kg (155 lb)   SpO2 96%   BMI 23.57 kg/m²     Physical Exam  Vitals and nursing note reviewed.   Constitutional:       General: She is not in acute distress.     Appearance: She is well-developed. She is not diaphoretic.   Neck:      Thyroid: No thyromegaly.      Trachea: No tracheal deviation.   Cardiovascular:      Rate and Rhythm: Normal rate and regular rhythm.      Heart sounds: Normal heart sounds. No murmur heard.  Pulmonary:      Effort: Pulmonary effort is normal. No respiratory distress.      Breath sounds: Normal breath sounds. No wheezing.   Musculoskeletal:         General: No tenderness or deformity. Normal range of motion.      Cervical back: Normal range of motion and neck supple.      Right lower leg: No edema.      Left lower leg: No edema.   Lymphadenopathy:      Cervical: No cervical adenopathy.   Skin:     General: Skin is warm and dry.      Findings: No erythema or rash.   Neurological:      Mental Status: She is alert and oriented to person, place, and time.   Psychiatric:         Mood and Affect: Mood normal.         Behavior: Behavior normal. Behavior is " cooperative.         Thought Content: Thought content normal.         Judgment: Judgment normal.

## 2025-05-19 DIAGNOSIS — F41.8 DEPRESSION WITH ANXIETY: ICD-10-CM

## 2025-05-19 RX ORDER — LORAZEPAM 2 MG/1
2 TABLET ORAL 2 TIMES DAILY PRN
Qty: 60 TABLET | Refills: 5 | Status: SHIPPED | OUTPATIENT
Start: 2025-05-19

## 2025-05-21 ENCOUNTER — APPOINTMENT (OUTPATIENT)
Dept: LAB | Facility: CLINIC | Age: 75
End: 2025-05-21
Attending: NURSE PRACTITIONER
Payer: COMMERCIAL

## 2025-05-21 ENCOUNTER — RESULTS FOLLOW-UP (OUTPATIENT)
Dept: FAMILY MEDICINE CLINIC | Facility: CLINIC | Age: 75
End: 2025-05-21

## 2025-05-21 DIAGNOSIS — Z13.220 SCREENING CHOLESTEROL LEVEL: ICD-10-CM

## 2025-05-21 DIAGNOSIS — E55.9 VITAMIN D DEFICIENCY: ICD-10-CM

## 2025-05-21 DIAGNOSIS — E03.9 ACQUIRED HYPOTHYROIDISM: ICD-10-CM

## 2025-05-21 DIAGNOSIS — I10 ESSENTIAL HYPERTENSION: ICD-10-CM

## 2025-05-21 LAB
25(OH)D3 SERPL-MCNC: 70.6 NG/ML (ref 30–100)
ALBUMIN SERPL BCG-MCNC: 4.5 G/DL (ref 3.5–5)
ALP SERPL-CCNC: 77 U/L (ref 34–104)
ALT SERPL W P-5'-P-CCNC: 15 U/L (ref 7–52)
ANION GAP SERPL CALCULATED.3IONS-SCNC: 11 MMOL/L (ref 4–13)
AST SERPL W P-5'-P-CCNC: 23 U/L (ref 13–39)
BASOPHILS # BLD AUTO: 0.04 THOUSANDS/ÂΜL (ref 0–0.1)
BASOPHILS NFR BLD AUTO: 1 % (ref 0–1)
BILIRUB SERPL-MCNC: 0.54 MG/DL (ref 0.2–1)
BUN SERPL-MCNC: 19 MG/DL (ref 5–25)
CALCIUM SERPL-MCNC: 9.4 MG/DL (ref 8.4–10.2)
CHLORIDE SERPL-SCNC: 100 MMOL/L (ref 96–108)
CHOLEST SERPL-MCNC: 247 MG/DL (ref ?–200)
CO2 SERPL-SCNC: 26 MMOL/L (ref 21–32)
CREAT SERPL-MCNC: 0.86 MG/DL (ref 0.6–1.3)
EOSINOPHIL # BLD AUTO: 0.16 THOUSAND/ÂΜL (ref 0–0.61)
EOSINOPHIL NFR BLD AUTO: 4 % (ref 0–6)
ERYTHROCYTE [DISTWIDTH] IN BLOOD BY AUTOMATED COUNT: 11.9 % (ref 11.6–15.1)
GFR SERPL CREATININE-BSD FRML MDRD: 67 ML/MIN/1.73SQ M
GLUCOSE P FAST SERPL-MCNC: 89 MG/DL (ref 65–99)
HCT VFR BLD AUTO: 41.9 % (ref 34.8–46.1)
HDLC SERPL-MCNC: 94 MG/DL
HGB BLD-MCNC: 13.8 G/DL (ref 11.5–15.4)
IMM GRANULOCYTES # BLD AUTO: 0 THOUSAND/UL (ref 0–0.2)
IMM GRANULOCYTES NFR BLD AUTO: 0 % (ref 0–2)
LDLC SERPL CALC-MCNC: 139 MG/DL (ref 0–100)
LYMPHOCYTES # BLD AUTO: 1.14 THOUSANDS/ÂΜL (ref 0.6–4.47)
LYMPHOCYTES NFR BLD AUTO: 27 % (ref 14–44)
MCH RBC QN AUTO: 32.6 PG (ref 26.8–34.3)
MCHC RBC AUTO-ENTMCNC: 32.9 G/DL (ref 31.4–37.4)
MCV RBC AUTO: 99 FL (ref 82–98)
MONOCYTES # BLD AUTO: 0.34 THOUSAND/ÂΜL (ref 0.17–1.22)
MONOCYTES NFR BLD AUTO: 8 % (ref 4–12)
NEUTROPHILS # BLD AUTO: 2.63 THOUSANDS/ÂΜL (ref 1.85–7.62)
NEUTS SEG NFR BLD AUTO: 60 % (ref 43–75)
NONHDLC SERPL-MCNC: 153 MG/DL
NRBC BLD AUTO-RTO: 0 /100 WBCS
PLATELET # BLD AUTO: 226 THOUSANDS/UL (ref 149–390)
PMV BLD AUTO: 10.1 FL (ref 8.9–12.7)
POTASSIUM SERPL-SCNC: 4.4 MMOL/L (ref 3.5–5.3)
PROT SERPL-MCNC: 7.6 G/DL (ref 6.4–8.4)
RBC # BLD AUTO: 4.23 MILLION/UL (ref 3.81–5.12)
SODIUM SERPL-SCNC: 137 MMOL/L (ref 135–147)
T4 FREE SERPL-MCNC: 1.11 NG/DL (ref 0.61–1.12)
TRIGL SERPL-MCNC: 70 MG/DL (ref ?–150)
TSH SERPL DL<=0.05 MIU/L-ACNC: 0.39 UIU/ML (ref 0.45–4.5)
WBC # BLD AUTO: 4.31 THOUSAND/UL (ref 4.31–10.16)

## 2025-05-21 PROCEDURE — 85025 COMPLETE CBC W/AUTO DIFF WBC: CPT

## 2025-05-21 PROCEDURE — 80061 LIPID PANEL: CPT

## 2025-05-21 PROCEDURE — 82306 VITAMIN D 25 HYDROXY: CPT

## 2025-05-21 PROCEDURE — 84443 ASSAY THYROID STIM HORMONE: CPT

## 2025-05-21 PROCEDURE — 36415 COLL VENOUS BLD VENIPUNCTURE: CPT

## 2025-05-21 PROCEDURE — 80053 COMPREHEN METABOLIC PANEL: CPT

## 2025-05-21 PROCEDURE — 84439 ASSAY OF FREE THYROXINE: CPT

## 2025-06-23 ENCOUNTER — OFFICE VISIT (OUTPATIENT)
Dept: FAMILY MEDICINE CLINIC | Facility: CLINIC | Age: 75
End: 2025-06-23
Payer: COMMERCIAL

## 2025-06-23 VITALS
TEMPERATURE: 98.6 F | HEART RATE: 83 BPM | BODY MASS INDEX: 23.64 KG/M2 | SYSTOLIC BLOOD PRESSURE: 122 MMHG | RESPIRATION RATE: 18 BRPM | WEIGHT: 156 LBS | HEIGHT: 68 IN | OXYGEN SATURATION: 94 % | DIASTOLIC BLOOD PRESSURE: 60 MMHG

## 2025-06-23 DIAGNOSIS — F41.8 DEPRESSION WITH ANXIETY: ICD-10-CM

## 2025-06-23 PROCEDURE — G2211 COMPLEX E/M VISIT ADD ON: HCPCS | Performed by: NURSE PRACTITIONER

## 2025-06-23 PROCEDURE — 99213 OFFICE O/P EST LOW 20 MIN: CPT | Performed by: NURSE PRACTITIONER

## 2025-06-23 NOTE — PROGRESS NOTES
Name: Radha Saini      : 1950      MRN: [id number]  Encounter Provider: MARY Gastelum  Encounter Date: 2025   Encounter department: [facility] PRIMARY CARE  :  Assessment & Plan  Depression with anxiety      Orders:    FLUoxetine (PROzac) 20 mg capsule; Take 1 capsule (20 mg total) by mouth daily          Depression Screening and Follow-up Plan: Patient was screened for depression during today's encounter. They screened negative with a PHQ-9 score of 0.        History of Present Illness   Here for 6 week follow up:    Note from visit on 25 (Would like to discuss lack of motivation- denies suicidal thoughts. She reports she continues with anxiety when grandkids visit- she takes Lorazepam 2mg at bedtime and when grandkids come over (3-4 times a week). She is agreeable to cut Lorazepam in 1/2 at bedtime and during the day when needed- will trial 1mg. She is agreeable to decrease Prozac from 60mg to 40mg daily, irene in 6 weeks. We discussed that her allergies may also be worsening her fatigue/ambition.)      Has been doing well on 40 mg of Prozac and is interested in decreasing to 20mg daily. Kept the dose of Lorazepam at 2mg at bedtime and a few times a week when needed. Does not want to decrease Lorazepam dose.   She reports she is feeling improved, more energy.           Review of Systems   Constitutional:  Positive for fatigue. Negative for diaphoresis and fever.   HENT:  Negative for congestion, facial swelling, hearing loss, rhinorrhea, sinus pressure, sinus pain, sneezing, sore throat and voice change.    Eyes:  Negative for discharge and visual disturbance.   Respiratory:  Negative for cough, choking, chest tightness, shortness of breath, wheezing and stridor.    Cardiovascular:  Negative for chest pain, palpitations and leg swelling.   Gastrointestinal:  Negative for abdominal distention, abdominal pain, constipation, diarrhea, nausea and vomiting.   Endocrine: Negative for  "polydipsia, polyphagia and polyuria.   Genitourinary:  Negative for difficulty urinating, dysuria, frequency and urgency.   Skin:  Negative for color change, rash and wound.   Neurological:  Negative for dizziness, syncope, speech difficulty, weakness, light-headedness and headaches.   Hematological:  Negative for adenopathy. Does not bruise/bleed easily.   Psychiatric/Behavioral:  Positive for dysphoric mood. Negative for agitation, behavioral problems, confusion, hallucinations, sleep disturbance and suicidal ideas. The patient is nervous/anxious.        Objective   /60   Pulse 83   Temp 98.6 °F (37 °C)   Resp 18   Ht 5' 8\" (1.727 m)   Wt 70.8 kg (156 lb)   SpO2 94%   BMI 23.72 kg/m²      Physical Exam  Vitals and nursing note reviewed.   Constitutional:       General: She is not in acute distress.     Appearance: She is well-developed. She is not diaphoretic.   Neck:      Thyroid: No thyromegaly.      Trachea: No tracheal deviation.     Cardiovascular:      Rate and Rhythm: Normal rate and regular rhythm.      Heart sounds: Normal heart sounds. No murmur heard.  Pulmonary:      Effort: Pulmonary effort is normal. No respiratory distress.      Breath sounds: Normal breath sounds. No wheezing.     Musculoskeletal:         General: No tenderness or deformity. Normal range of motion.      Cervical back: Normal range of motion and neck supple.     Skin:     General: Skin is warm and dry.      Findings: No erythema or rash.     Neurological:      Mental Status: She is alert and oriented to person, place, and time.     Psychiatric:         Attention and Perception: Attention normal.         Mood and Affect: Mood is anxious. Mood is not depressed. Affect is not flat.         Behavior: Behavior normal. Behavior is cooperative.         Thought Content: Thought content normal.         Cognition and Memory: Cognition and memory normal.         Judgment: Judgment normal.         "

## 2025-06-27 DIAGNOSIS — K52.9 COLITIS: ICD-10-CM

## 2025-06-27 RX ORDER — PANTOPRAZOLE SODIUM 40 MG/1
40 TABLET, DELAYED RELEASE ORAL DAILY
Qty: 90 TABLET | Refills: 1 | Status: SHIPPED | OUTPATIENT
Start: 2025-06-27

## 2025-07-21 ENCOUNTER — OFFICE VISIT (OUTPATIENT)
Dept: FAMILY MEDICINE CLINIC | Facility: CLINIC | Age: 75
End: 2025-07-21
Payer: COMMERCIAL

## 2025-07-21 VITALS
OXYGEN SATURATION: 98 % | RESPIRATION RATE: 16 BRPM | WEIGHT: 158 LBS | DIASTOLIC BLOOD PRESSURE: 62 MMHG | HEIGHT: 68 IN | SYSTOLIC BLOOD PRESSURE: 120 MMHG | TEMPERATURE: 98.2 F | BODY MASS INDEX: 23.95 KG/M2 | HEART RATE: 72 BPM

## 2025-07-21 DIAGNOSIS — N61.0 CELLULITIS OF BREAST: Primary | ICD-10-CM

## 2025-07-21 PROCEDURE — 99213 OFFICE O/P EST LOW 20 MIN: CPT | Performed by: NURSE PRACTITIONER

## 2025-07-21 PROCEDURE — G2211 COMPLEX E/M VISIT ADD ON: HCPCS | Performed by: NURSE PRACTITIONER

## 2025-07-21 RX ORDER — CEPHALEXIN 500 MG/1
500 CAPSULE ORAL 3 TIMES DAILY
Qty: 21 CAPSULE | Refills: 0 | Status: SHIPPED | OUTPATIENT
Start: 2025-07-21 | End: 2025-07-28

## 2025-07-21 NOTE — PROGRESS NOTES
"Name: Radha Saini      : 1950      MRN: [id number]  Encounter Provider: MARY Sanabria  Encounter Date: 2025   Encounter department: [facility] PRIMARY CARE  :  Assessment & Plan  Cellulitis of breast    Orders:    cephalexin (KEFLEX) 500 mg capsule; Take 1 capsule (500 mg total) by mouth 3 (three) times a day for 7 days           History of Present Illness   Patient presents for an acute visit. She states she had an injury to the left breast and nipple. She had a small amount of pus to the area. She is using ice and heat to the area.     Upon examination she does have an enlarged nipple and redness around the area and there is warmth to touch       Review of Systems   Constitutional:  Negative for chills and fever.   HENT:  Negative for ear pain and sore throat.    Eyes:  Negative for pain and visual disturbance.   Respiratory:  Negative for cough and shortness of breath.    Cardiovascular:  Negative for chest pain and palpitations.   Gastrointestinal:  Negative for abdominal pain and vomiting.   Genitourinary:  Negative for dysuria and hematuria.   Musculoskeletal:  Negative for arthralgias and back pain.   Skin:  Positive for color change. Negative for rash.   Neurological:  Negative for seizures and syncope.   All other systems reviewed and are negative.      Objective   /62   Pulse 72   Temp 98.2 °F (36.8 °C) (Temporal)   Resp 16   Ht 5' 8\" (1.727 m)   Wt 71.7 kg (158 lb)   SpO2 98%   BMI 24.02 kg/m²      Physical Exam  Vitals and nursing note reviewed.   Constitutional:       General: She is not in acute distress.     Appearance: She is well-developed.   HENT:      Head: Normocephalic and atraumatic.     Eyes:      Conjunctiva/sclera: Conjunctivae normal.       Cardiovascular:      Rate and Rhythm: Normal rate and regular rhythm.      Heart sounds: No murmur heard.  Pulmonary:      Effort: Pulmonary effort is normal. No respiratory distress.      Breath sounds: " Normal breath sounds.   Abdominal:      Palpations: Abdomen is soft.      Tenderness: There is no abdominal tenderness.     Musculoskeletal:         General: No swelling.      Cervical back: Neck supple.     Skin:     General: Skin is warm and dry.      Capillary Refill: Capillary refill takes less than 2 seconds.      Findings: Erythema present.      Comments: To the left nipple and surrounding tissue      Neurological:      Mental Status: She is alert and oriented to person, place, and time.     Psychiatric:         Mood and Affect: Mood normal.

## 2025-07-24 DIAGNOSIS — E03.9 ACQUIRED HYPOTHYROIDISM: ICD-10-CM

## 2025-07-25 DIAGNOSIS — F41.9 ANXIETY: ICD-10-CM

## 2025-07-25 RX ORDER — BUSPIRONE HYDROCHLORIDE 10 MG/1
5 TABLET ORAL 3 TIMES DAILY
Qty: 135 TABLET | Refills: 1 | Status: SHIPPED | OUTPATIENT
Start: 2025-07-25

## 2025-07-25 RX ORDER — LEVOTHYROXINE SODIUM 100 UG/1
100 TABLET ORAL DAILY
Qty: 90 TABLET | Refills: 1 | Status: SHIPPED | OUTPATIENT
Start: 2025-07-25

## (undated) DEVICE — GLOVE INDICATOR PI UNDERGLOVE SZ 7.5 BLUE

## (undated) DEVICE — 3000CC GUARDIAN II: Brand: GUARDIAN

## (undated) DEVICE — STERILE MANDIBLE PACK: Brand: CARDINAL HEALTH

## (undated) DEVICE — GLOVE SRG BIOGEL ECLIPSE 7.5

## (undated) DEVICE — NEEDLE 25G X 1 1/2

## (undated) DEVICE — 2000CC GUARDIAN II: Brand: GUARDIAN

## (undated) DEVICE — SYRINGE 10ML LL